# Patient Record
Sex: FEMALE | Race: WHITE | Employment: FULL TIME | URBAN - NONMETROPOLITAN AREA
[De-identification: names, ages, dates, MRNs, and addresses within clinical notes are randomized per-mention and may not be internally consistent; named-entity substitution may affect disease eponyms.]

---

## 2017-03-23 ENCOUNTER — OFFICE VISIT (OUTPATIENT)
Dept: FAMILY MEDICINE CLINIC | Age: 62
End: 2017-03-23

## 2017-03-23 VITALS
HEART RATE: 52 BPM | WEIGHT: 185 LBS | BODY MASS INDEX: 30.79 KG/M2 | RESPIRATION RATE: 14 BRPM | SYSTOLIC BLOOD PRESSURE: 166 MMHG | DIASTOLIC BLOOD PRESSURE: 94 MMHG

## 2017-03-23 DIAGNOSIS — G47.19 EXCESSIVE DAYTIME SLEEPINESS: ICD-10-CM

## 2017-03-23 DIAGNOSIS — I10 ESSENTIAL HYPERTENSION: Primary | ICD-10-CM

## 2017-03-23 DIAGNOSIS — Z00.00 ROUTINE HEALTH MAINTENANCE: ICD-10-CM

## 2017-03-23 LAB
A/G RATIO: 1.8 (ref 1.1–2.2)
ALBUMIN SERPL-MCNC: 4.5 G/DL (ref 3.4–5)
ALP BLD-CCNC: 71 U/L (ref 40–129)
ALT SERPL-CCNC: 12 U/L (ref 10–40)
ANION GAP SERPL CALCULATED.3IONS-SCNC: 13 MMOL/L (ref 3–16)
AST SERPL-CCNC: 15 U/L (ref 15–37)
BILIRUB SERPL-MCNC: 0.5 MG/DL (ref 0–1)
BUN BLDV-MCNC: 20 MG/DL (ref 7–20)
CALCIUM SERPL-MCNC: 9.6 MG/DL (ref 8.3–10.6)
CHLORIDE BLD-SCNC: 102 MMOL/L (ref 99–110)
CHOLESTEROL, TOTAL: 203 MG/DL (ref 0–199)
CO2: 27 MMOL/L (ref 21–32)
CREAT SERPL-MCNC: 1 MG/DL (ref 0.6–1.2)
GFR AFRICAN AMERICAN: >60
GFR NON-AFRICAN AMERICAN: 56
GLOBULIN: 2.5 G/DL
GLUCOSE BLD-MCNC: 86 MG/DL (ref 70–99)
HCT VFR BLD CALC: 41.5 % (ref 36–48)
HDLC SERPL-MCNC: 57 MG/DL (ref 40–60)
HEMOGLOBIN: 14 G/DL (ref 12–16)
LDL CHOLESTEROL CALCULATED: 126 MG/DL
MCH RBC QN AUTO: 31.1 PG (ref 26–34)
MCHC RBC AUTO-ENTMCNC: 33.8 G/DL (ref 31–36)
MCV RBC AUTO: 92 FL (ref 80–100)
PDW BLD-RTO: 12.1 % (ref 12.4–15.4)
PLATELET # BLD: 193 K/UL (ref 135–450)
PMV BLD AUTO: 10.8 FL (ref 5–10.5)
POTASSIUM SERPL-SCNC: 4.2 MMOL/L (ref 3.5–5.1)
RBC # BLD: 4.51 M/UL (ref 4–5.2)
SODIUM BLD-SCNC: 142 MMOL/L (ref 136–145)
TOTAL PROTEIN: 7 G/DL (ref 6.4–8.2)
TRIGL SERPL-MCNC: 102 MG/DL (ref 0–150)
TSH REFLEX: 1.56 UIU/ML (ref 0.27–4.2)
VLDLC SERPL CALC-MCNC: 20 MG/DL
WBC # BLD: 7 K/UL (ref 4–11)

## 2017-03-23 PROCEDURE — 99214 OFFICE O/P EST MOD 30 MIN: CPT | Performed by: NURSE PRACTITIONER

## 2017-03-23 RX ORDER — BISOPROLOL FUMARATE AND HYDROCHLOROTHIAZIDE 10; 6.25 MG/1; MG/1
1 TABLET ORAL DAILY
Qty: 30 TABLET | Refills: 3 | Status: SHIPPED | OUTPATIENT
Start: 2017-03-23 | End: 2017-09-21 | Stop reason: ALTCHOICE

## 2017-03-23 RX ORDER — LISINOPRIL 20 MG/1
20 TABLET ORAL DAILY
Qty: 30 TABLET | Refills: 0
Start: 2017-03-23 | End: 2017-03-23 | Stop reason: SINTOL

## 2017-03-23 ASSESSMENT — ENCOUNTER SYMPTOMS
COUGH: 0
EYE PAIN: 0
CHEST TIGHTNESS: 0
SHORTNESS OF BREATH: 0
NAUSEA: 0
ABDOMINAL PAIN: 0

## 2017-03-24 RX ORDER — ATORVASTATIN CALCIUM 20 MG/1
20 TABLET, FILM COATED ORAL DAILY
Qty: 30 TABLET | Refills: 3 | Status: SHIPPED | OUTPATIENT
Start: 2017-03-24 | End: 2017-12-19

## 2017-04-10 ENCOUNTER — TELEPHONE (OUTPATIENT)
Dept: FAMILY MEDICINE CLINIC | Age: 62
End: 2017-04-10

## 2017-04-11 DIAGNOSIS — I10 ESSENTIAL HYPERTENSION: Primary | ICD-10-CM

## 2017-04-11 RX ORDER — AMLODIPINE BESYLATE 5 MG/1
5 TABLET ORAL DAILY
Qty: 30 TABLET | Refills: 3 | Status: SHIPPED | OUTPATIENT
Start: 2017-04-11 | End: 2017-10-26 | Stop reason: SDUPTHER

## 2017-05-17 ENCOUNTER — TELEPHONE (OUTPATIENT)
Dept: FAMILY MEDICINE CLINIC | Age: 62
End: 2017-05-17

## 2017-06-23 ENCOUNTER — TELEPHONE (OUTPATIENT)
Dept: FAMILY MEDICINE CLINIC | Age: 62
End: 2017-06-23

## 2017-07-18 ENCOUNTER — TELEPHONE (OUTPATIENT)
Dept: FAMILY MEDICINE CLINIC | Age: 62
End: 2017-07-18

## 2017-09-16 DIAGNOSIS — E03.9 ACQUIRED HYPOTHYROIDISM: ICD-10-CM

## 2017-09-18 RX ORDER — LEVOTHYROXINE SODIUM 0.1 MG/1
TABLET ORAL
Qty: 30 TABLET | Refills: 3 | Status: SHIPPED | OUTPATIENT
Start: 2017-09-18 | End: 2017-12-19 | Stop reason: SDUPTHER

## 2017-09-21 ENCOUNTER — OFFICE VISIT (OUTPATIENT)
Dept: FAMILY MEDICINE CLINIC | Age: 62
End: 2017-09-21

## 2017-09-21 VITALS
RESPIRATION RATE: 14 BRPM | HEART RATE: 64 BPM | WEIGHT: 186 LBS | HEIGHT: 64 IN | BODY MASS INDEX: 31.76 KG/M2 | SYSTOLIC BLOOD PRESSURE: 132 MMHG | DIASTOLIC BLOOD PRESSURE: 78 MMHG

## 2017-09-21 DIAGNOSIS — E03.9 ACQUIRED HYPOTHYROIDISM: ICD-10-CM

## 2017-09-21 DIAGNOSIS — I10 ESSENTIAL HYPERTENSION: ICD-10-CM

## 2017-09-21 DIAGNOSIS — Z12.31 ENCOUNTER FOR SCREENING MAMMOGRAM FOR BREAST CANCER: ICD-10-CM

## 2017-09-21 PROCEDURE — G8427 DOCREV CUR MEDS BY ELIG CLIN: HCPCS | Performed by: NURSE PRACTITIONER

## 2017-09-21 PROCEDURE — 3017F COLORECTAL CA SCREEN DOC REV: CPT | Performed by: NURSE PRACTITIONER

## 2017-09-21 PROCEDURE — 99213 OFFICE O/P EST LOW 20 MIN: CPT | Performed by: NURSE PRACTITIONER

## 2017-09-21 PROCEDURE — 1036F TOBACCO NON-USER: CPT | Performed by: NURSE PRACTITIONER

## 2017-09-21 PROCEDURE — G8417 CALC BMI ABV UP PARAM F/U: HCPCS | Performed by: NURSE PRACTITIONER

## 2017-09-21 PROCEDURE — 3014F SCREEN MAMMO DOC REV: CPT | Performed by: NURSE PRACTITIONER

## 2017-09-21 RX ORDER — BISOPROLOL FUMARATE AND HYDROCHLOROTHIAZIDE 5; 6.25 MG/1; MG/1
1 TABLET ORAL 2 TIMES DAILY
Qty: 60 TABLET | Refills: 1 | Status: SHIPPED | OUTPATIENT
Start: 2017-09-21 | End: 2017-10-26 | Stop reason: SDUPTHER

## 2017-09-21 ASSESSMENT — PATIENT HEALTH QUESTIONNAIRE - PHQ9
2. FEELING DOWN, DEPRESSED OR HOPELESS: 0
SUM OF ALL RESPONSES TO PHQ9 QUESTIONS 1 & 2: 0
1. LITTLE INTEREST OR PLEASURE IN DOING THINGS: 0
SUM OF ALL RESPONSES TO PHQ QUESTIONS 1-9: 0

## 2017-09-22 ASSESSMENT — ENCOUNTER SYMPTOMS
NAUSEA: 0
COUGH: 0
ABDOMINAL PAIN: 0
CHEST TIGHTNESS: 0
SHORTNESS OF BREATH: 0
RESPIRATORY NEGATIVE: 1
EYES NEGATIVE: 1
GASTROINTESTINAL NEGATIVE: 1
EYE PAIN: 0

## 2017-10-18 ENCOUNTER — HOSPITAL ENCOUNTER (OUTPATIENT)
Dept: MAMMOGRAPHY | Age: 62
Discharge: HOME | End: 2017-10-18
Attending: NURSE PRACTITIONER | Admitting: NURSE PRACTITIONER

## 2017-10-18 DIAGNOSIS — Z12.31 ENCOUNTER FOR SCREENING MAMMOGRAM FOR BREAST CANCER: ICD-10-CM

## 2017-10-19 DIAGNOSIS — R92.8 ABNORMAL MAMMOGRAM: Primary | ICD-10-CM

## 2017-10-26 ENCOUNTER — OFFICE VISIT (OUTPATIENT)
Dept: FAMILY MEDICINE CLINIC | Age: 62
End: 2017-10-26

## 2017-10-26 VITALS
BODY MASS INDEX: 32.06 KG/M2 | OXYGEN SATURATION: 98 % | HEART RATE: 67 BPM | RESPIRATION RATE: 14 BRPM | SYSTOLIC BLOOD PRESSURE: 140 MMHG | WEIGHT: 186.8 LBS | DIASTOLIC BLOOD PRESSURE: 100 MMHG

## 2017-10-26 DIAGNOSIS — I10 ESSENTIAL HYPERTENSION: ICD-10-CM

## 2017-10-26 LAB
A/G RATIO: 1.7 (ref 1.1–2.2)
ALBUMIN SERPL-MCNC: 4.3 G/DL (ref 3.4–5)
ALP BLD-CCNC: 70 U/L (ref 40–129)
ALT SERPL-CCNC: 14 U/L (ref 10–40)
ANION GAP SERPL CALCULATED.3IONS-SCNC: 16 MMOL/L (ref 3–16)
AST SERPL-CCNC: 16 U/L (ref 15–37)
BILIRUB SERPL-MCNC: 0.6 MG/DL (ref 0–1)
BUN BLDV-MCNC: 21 MG/DL (ref 7–20)
CALCIUM SERPL-MCNC: 9.4 MG/DL (ref 8.3–10.6)
CHLORIDE BLD-SCNC: 98 MMOL/L (ref 99–110)
CHOLESTEROL, TOTAL: 188 MG/DL (ref 0–199)
CO2: 26 MMOL/L (ref 21–32)
CREAT SERPL-MCNC: 1 MG/DL (ref 0.6–1.2)
GFR AFRICAN AMERICAN: >60
GFR NON-AFRICAN AMERICAN: 56
GLOBULIN: 2.5 G/DL
GLUCOSE BLD-MCNC: 86 MG/DL (ref 70–99)
HDLC SERPL-MCNC: 58 MG/DL (ref 40–60)
LDL CHOLESTEROL CALCULATED: 105 MG/DL
POTASSIUM SERPL-SCNC: 3.9 MMOL/L (ref 3.5–5.1)
SODIUM BLD-SCNC: 140 MMOL/L (ref 136–145)
TOTAL PROTEIN: 6.8 G/DL (ref 6.4–8.2)
TRIGL SERPL-MCNC: 126 MG/DL (ref 0–150)
VLDLC SERPL CALC-MCNC: 25 MG/DL

## 2017-10-26 PROCEDURE — G8427 DOCREV CUR MEDS BY ELIG CLIN: HCPCS | Performed by: NURSE PRACTITIONER

## 2017-10-26 PROCEDURE — G8484 FLU IMMUNIZE NO ADMIN: HCPCS | Performed by: NURSE PRACTITIONER

## 2017-10-26 PROCEDURE — 3017F COLORECTAL CA SCREEN DOC REV: CPT | Performed by: NURSE PRACTITIONER

## 2017-10-26 PROCEDURE — G8417 CALC BMI ABV UP PARAM F/U: HCPCS | Performed by: NURSE PRACTITIONER

## 2017-10-26 PROCEDURE — 99213 OFFICE O/P EST LOW 20 MIN: CPT | Performed by: NURSE PRACTITIONER

## 2017-10-26 PROCEDURE — 3014F SCREEN MAMMO DOC REV: CPT | Performed by: NURSE PRACTITIONER

## 2017-10-26 PROCEDURE — 1036F TOBACCO NON-USER: CPT | Performed by: NURSE PRACTITIONER

## 2017-10-26 RX ORDER — AMLODIPINE BESYLATE 5 MG/1
5 TABLET ORAL DAILY
Qty: 30 TABLET | Refills: 1 | Status: SHIPPED | OUTPATIENT
Start: 2017-10-26 | End: 2017-11-09 | Stop reason: SDUPTHER

## 2017-10-26 RX ORDER — BISOPROLOL FUMARATE AND HYDROCHLOROTHIAZIDE 5; 6.25 MG/1; MG/1
1 TABLET ORAL 2 TIMES DAILY
Qty: 60 TABLET | Refills: 1 | Status: SHIPPED | OUTPATIENT
Start: 2017-10-26 | End: 2017-11-09 | Stop reason: SDUPTHER

## 2017-10-26 NOTE — PROGRESS NOTES
Subjective:   Brittany Lee is a 58 y.o. female with hypertension. Current Outpatient Prescriptions   Medication Sig Dispense Refill    bisoprolol-hydrochlorothiazide (ZIAC) 5-6.25 MG per tablet Take 1 tablet by mouth 2 times daily 60 tablet 1    amLODIPine (NORVASC) 5 MG tablet Take 1 tablet by mouth daily 30 tablet 1    levothyroxine (SYNTHROID) 100 MCG tablet TAKE ONE TABLET BY MOUTH ONCE DAILY 30 tablet 3    atorvastatin (LIPITOR) 20 MG tablet Take 1 tablet by mouth daily 30 tablet 3     No current facility-administered medications for this visit. Hypertension ROS: no medication side effects noted, no TIA's, no chest pain on exertion, no dyspnea on exertion, no swelling of ankles. New concerns: not feeling well. Has not been taking norvasc. 'didn't know I was supposed to be on it'     Objective:   BP (!) 140/100 (Site: Right Arm, Position: Sitting, Cuff Size: Medium Adult)   Pulse 67   Resp 14   Wt 186 lb 12.8 oz (84.7 kg)   SpO2 98%   BMI 32.06 kg/m²    Appearance alert, well appearing, and in no distress, oriented to person, place, and time, normal appearing weight and well hydrated. General exam BP noted to be well controlled today in office, S1, S2 normal, no gallop, no murmur, chest clear, no JVD, no HSM, no edema. Lab review: orders written for new lab studies as appropriate; see orders. Assessment:    Hypertension control uncertain and poorly controlled. Plan:   Repeat labs ordered prior to next appointment. Orders and follow up as documented in patient record. The following changes are to be made: add norvasc  back in.    1. Essential hypertension  meds per order. Labs today    - bisoprolol-hydrochlorothiazide (ZIAC) 5-6.25 MG per tablet; Take 1 tablet by mouth 2 times daily  Dispense: 60 tablet; Refill: 1  - amLODIPine (NORVASC) 5 MG tablet; Take 1 tablet by mouth daily  Dispense: 30 tablet;  Refill: 1  - Comprehensive Metabolic Panel  - Lipid Panel    Return in about 2

## 2017-11-02 ENCOUNTER — HOSPITAL ENCOUNTER (OUTPATIENT)
Dept: ULTRASOUND IMAGING | Age: 62
Discharge: HOME | End: 2017-11-02
Attending: NURSE PRACTITIONER | Admitting: NURSE PRACTITIONER

## 2017-11-02 DIAGNOSIS — R92.8 ABNORMAL MAMMOGRAM: ICD-10-CM

## 2017-11-09 ENCOUNTER — OFFICE VISIT (OUTPATIENT)
Dept: FAMILY MEDICINE CLINIC | Age: 62
End: 2017-11-09

## 2017-11-09 VITALS
OXYGEN SATURATION: 98 % | BODY MASS INDEX: 31.58 KG/M2 | SYSTOLIC BLOOD PRESSURE: 148 MMHG | HEART RATE: 54 BPM | WEIGHT: 184 LBS | DIASTOLIC BLOOD PRESSURE: 76 MMHG | RESPIRATION RATE: 14 BRPM

## 2017-11-09 DIAGNOSIS — Z56.6 STRESS AT WORK: ICD-10-CM

## 2017-11-09 DIAGNOSIS — I10 ESSENTIAL HYPERTENSION: Primary | ICD-10-CM

## 2017-11-09 PROCEDURE — G8417 CALC BMI ABV UP PARAM F/U: HCPCS | Performed by: NURSE PRACTITIONER

## 2017-11-09 PROCEDURE — 99213 OFFICE O/P EST LOW 20 MIN: CPT | Performed by: NURSE PRACTITIONER

## 2017-11-09 PROCEDURE — 3017F COLORECTAL CA SCREEN DOC REV: CPT | Performed by: NURSE PRACTITIONER

## 2017-11-09 PROCEDURE — 1036F TOBACCO NON-USER: CPT | Performed by: NURSE PRACTITIONER

## 2017-11-09 PROCEDURE — G8482 FLU IMMUNIZE ORDER/ADMIN: HCPCS | Performed by: NURSE PRACTITIONER

## 2017-11-09 PROCEDURE — G8427 DOCREV CUR MEDS BY ELIG CLIN: HCPCS | Performed by: NURSE PRACTITIONER

## 2017-11-09 PROCEDURE — 3014F SCREEN MAMMO DOC REV: CPT | Performed by: NURSE PRACTITIONER

## 2017-11-09 RX ORDER — BISOPROLOL FUMARATE AND HYDROCHLOROTHIAZIDE 5; 6.25 MG/1; MG/1
1 TABLET ORAL 2 TIMES DAILY
Qty: 60 TABLET | Refills: 1 | Status: SHIPPED | OUTPATIENT
Start: 2017-11-09 | End: 2017-12-01 | Stop reason: SDUPTHER

## 2017-11-09 RX ORDER — AMLODIPINE BESYLATE 5 MG/1
5 TABLET ORAL 2 TIMES DAILY
Qty: 30 TABLET | Refills: 1 | Status: SHIPPED | OUTPATIENT
Start: 2017-11-09 | End: 2017-12-01 | Stop reason: SDUPTHER

## 2017-11-09 SDOH — HEALTH STABILITY - MENTAL HEALTH: OTHER PHYSICAL AND MENTAL STRAIN RELATED TO WORK: Z56.6

## 2017-11-09 ASSESSMENT — ENCOUNTER SYMPTOMS
EYE PAIN: 0
SHORTNESS OF BREATH: 0
COUGH: 0
CHEST TIGHTNESS: 0
ABDOMINAL PAIN: 0
NAUSEA: 0

## 2017-11-09 NOTE — PROGRESS NOTES
SUBJECTIVE:    Cierra Ames  1955  58 y.o.  female      Chief Complaint   Patient presents with    Follow-up     2 week f/u, pt feeling better and blood pressure is better as well    Other     would like to discuss how to manage stress     HPI    No problem-specific Assessment & Plan notes found for this encounter. Review of Systems    OBJECTIVE:    BP (!) 148/76 (Site: Right Arm, Position: Sitting, Cuff Size: Medium Adult)   Pulse 54   Resp 14   Wt 184 lb (83.5 kg)   SpO2 98%   BMI 31.58 kg/m²     Physical Exam    ASSESSMENT/PLAN:    1. Essential hypertension  ***  - amLODIPine (NORVASC) 5 MG tablet; Take 1 tablet by mouth 2 times daily  Dispense: 30 tablet; Refill: 1  - bisoprolol-hydrochlorothiazide (ZIAC) 5-6.25 MG per tablet; Take 1 tablet by mouth 2 times daily  Dispense: 60 tablet; Refill: 1    2. Stress at work  ***  - Ambulatory referral to Psychology      Return in about 1 month (around 12/9/2017) for HTN.             (Please note that portions of this note may have been completed with a voice recognition program. Efforts were made to edit the dictations but occasionally words are mis-transcribed.)

## 2017-11-09 NOTE — Clinical Note
This lady would like to see you for some suggestions on how to handle stress at work. She prefers to modify her behavior change her thinking rather than take medication. She seems excited to see you. Thanks!

## 2017-11-09 NOTE — ASSESSMENT & PLAN NOTE
Reports that she has feeling better. She notices she has more trouble with her blood pressure when she has periods of stress at work. She is asking for help with stress management and is agreeable to a referral to Dr. Denise Shook.

## 2017-11-09 NOTE — PROGRESS NOTES
SUBJECTIVE:    Author Harshad  1955  58 y.o.  female      Chief Complaint   Patient presents with    Follow-up     2 week f/u, pt feeling better and blood pressure is better as well    Other     would like to discuss how to manage stress     HPI    Essential hypertension  Reports that she has feeling better. She notices she has more trouble with her blood pressure when she has periods of stress at work. She is asking for help with stress management and is agreeable to a referral to Dr. Robyn Sánchez. Review of Systems   Constitutional: Negative for fatigue and unexpected weight change. Eyes: Negative for pain and visual disturbance. Respiratory: Negative for cough, chest tightness and shortness of breath. Cardiovascular: Negative for chest pain, palpitations and leg swelling. Gastrointestinal: Negative for abdominal pain and nausea. Endocrine: Negative. Neurological: Positive for headaches. Negative for dizziness, syncope, facial asymmetry, speech difficulty, light-headedness and numbness. All other systems reviewed and are negative. OBJECTIVE:    BP (!) 148/76 (Site: Right Arm, Position: Sitting, Cuff Size: Medium Adult)   Pulse 54   Resp 14   Wt 184 lb (83.5 kg)   SpO2 98%   BMI 31.58 kg/m²     Physical Exam   Constitutional: She is oriented to person, place, and time. She appears well-developed and well-nourished. HENT:   Head: Normocephalic and atraumatic. Eyes: Pupils are equal, round, and reactive to light. Neck: Normal range of motion. Neck supple. Cardiovascular: Normal rate, regular rhythm, normal heart sounds and intact distal pulses. No murmur heard. Pulmonary/Chest: Effort normal and breath sounds normal. No respiratory distress. Abdominal: Soft. Neurological: She is alert and oriented to person, place, and time. Skin: Skin is warm and dry. Psychiatric: She has a normal mood and affect. Judgment normal.   Vitals reviewed. ASSESSMENT/PLAN:    1.

## 2017-12-01 DIAGNOSIS — I10 ESSENTIAL HYPERTENSION: ICD-10-CM

## 2017-12-01 RX ORDER — AMLODIPINE BESYLATE 5 MG/1
5 TABLET ORAL 2 TIMES DAILY
Qty: 30 TABLET | Refills: 1 | Status: SHIPPED | OUTPATIENT
Start: 2017-12-01 | End: 2017-12-19 | Stop reason: SDUPTHER

## 2017-12-01 RX ORDER — BISOPROLOL FUMARATE AND HYDROCHLOROTHIAZIDE 5; 6.25 MG/1; MG/1
1 TABLET ORAL 2 TIMES DAILY
Qty: 60 TABLET | Refills: 1 | Status: SHIPPED | OUTPATIENT
Start: 2017-12-01 | End: 2017-12-19 | Stop reason: SDUPTHER

## 2017-12-01 NOTE — TELEPHONE ENCOUNTER
From: Brittany Lee  Sent: 12/1/2017 10:00 AM EST  Subject: Medication Renewal Request    Charmayne Rick. Fabio Bene would like a refill of the following medications:  amLODIPine (NORVASC) 5 MG tablet Sharlene Valdez CNP]  bisoprolol-hydrochlorothiazide [facility]) 5-6.25 MG per tablet Sharlene Valdez CNP]    Preferred pharmacy: [address] [phone] - F [phone]    Comment:

## 2017-12-19 ENCOUNTER — OFFICE VISIT (OUTPATIENT)
Dept: FAMILY MEDICINE CLINIC | Age: 62
End: 2017-12-19

## 2017-12-19 VITALS
OXYGEN SATURATION: 99 % | SYSTOLIC BLOOD PRESSURE: 122 MMHG | RESPIRATION RATE: 14 BRPM | DIASTOLIC BLOOD PRESSURE: 76 MMHG | WEIGHT: 185 LBS | HEART RATE: 56 BPM | BODY MASS INDEX: 31.76 KG/M2

## 2017-12-19 DIAGNOSIS — I10 ESSENTIAL HYPERTENSION: ICD-10-CM

## 2017-12-19 DIAGNOSIS — E03.9 ACQUIRED HYPOTHYROIDISM: ICD-10-CM

## 2017-12-19 PROCEDURE — G8417 CALC BMI ABV UP PARAM F/U: HCPCS | Performed by: NURSE PRACTITIONER

## 2017-12-19 PROCEDURE — 99213 OFFICE O/P EST LOW 20 MIN: CPT | Performed by: NURSE PRACTITIONER

## 2017-12-19 PROCEDURE — 3014F SCREEN MAMMO DOC REV: CPT | Performed by: NURSE PRACTITIONER

## 2017-12-19 PROCEDURE — 1036F TOBACCO NON-USER: CPT | Performed by: NURSE PRACTITIONER

## 2017-12-19 PROCEDURE — G8482 FLU IMMUNIZE ORDER/ADMIN: HCPCS | Performed by: NURSE PRACTITIONER

## 2017-12-19 PROCEDURE — 3017F COLORECTAL CA SCREEN DOC REV: CPT | Performed by: NURSE PRACTITIONER

## 2017-12-19 PROCEDURE — G8427 DOCREV CUR MEDS BY ELIG CLIN: HCPCS | Performed by: NURSE PRACTITIONER

## 2017-12-19 RX ORDER — AMLODIPINE BESYLATE 5 MG/1
5 TABLET ORAL 2 TIMES DAILY
Qty: 60 TABLET | Refills: 5 | Status: SHIPPED | OUTPATIENT
Start: 2017-12-19 | End: 2018-05-04 | Stop reason: ALTCHOICE

## 2017-12-19 RX ORDER — BISOPROLOL FUMARATE AND HYDROCHLOROTHIAZIDE 5; 6.25 MG/1; MG/1
1 TABLET ORAL 2 TIMES DAILY
Qty: 60 TABLET | Refills: 5 | Status: SHIPPED | OUTPATIENT
Start: 2017-12-19 | End: 2019-06-17 | Stop reason: SDUPTHER

## 2017-12-19 RX ORDER — LEVOTHYROXINE SODIUM 0.1 MG/1
TABLET ORAL
Qty: 30 TABLET | Refills: 5 | Status: SHIPPED | OUTPATIENT
Start: 2017-12-19 | End: 2018-07-27 | Stop reason: SDUPTHER

## 2017-12-19 ASSESSMENT — ENCOUNTER SYMPTOMS
COUGH: 0
ABDOMINAL PAIN: 0
NAUSEA: 0
CHEST TIGHTNESS: 0
GASTROINTESTINAL NEGATIVE: 1
EYE PAIN: 0
SHORTNESS OF BREATH: 0
RESPIRATORY NEGATIVE: 1

## 2017-12-19 NOTE — PROGRESS NOTES
SUBJECTIVE:    [facility]  1955  58 y.o.  female      Chief Complaint   Patient presents with    1 Month Follow-Up     HPI    Essential hypertension  No problems with SOB, CP, Blurred or double vision, no headaches or other concerns. No compliance problems with medications. Review of Systems   Constitutional: Negative. Negative for fatigue and unexpected weight change. Eyes: Negative for pain and visual disturbance. Respiratory: Negative. Negative for cough, chest tightness and shortness of breath. Cardiovascular: Negative for chest pain, palpitations and leg swelling. Gastrointestinal: Negative. Negative for abdominal pain and nausea. Endocrine: Negative. Neurological: Negative. Negative for dizziness, syncope, facial asymmetry, speech difficulty, light-headedness, numbness and headaches. Concern for right side 'face drooping'. Remote history of hitting face while playing soft ball in HS. Has noticed a larger 'crease' in her right cheek than left. All other systems reviewed and are negative. OBJECTIVE:    /76 (Site: Right Arm, Position: Sitting, Cuff Size: Large Adult)   Pulse 56   Resp 14   Wt 185 lb (83.9 kg)   SpO2 99%   BMI 31.76 kg/m²     Physical Exam   Constitutional: She is oriented to person, place, and time. She appears well-developed and well-nourished. No distress. HENT:   Head: Normocephalic and atraumatic. Head is without right periorbital erythema and without left periorbital erythema. Hair is normal.   Right Ear: External ear normal.   Left Ear: External ear normal.   Nose: Nose normal.   Mouth/Throat: Uvula is midline and oropharynx is clear and moist.   Eyes: Conjunctivae and EOM are normal. Pupils are equal, round, and reactive to light. Neck: Normal range of motion. Neck supple. No JVD present. Cardiovascular: Normal rate, regular rhythm, normal heart sounds and intact distal pulses.     Pulmonary/Chest: Effort normal and breath sounds normal. No respiratory distress. Lymphadenopathy:     She has no cervical adenopathy. Neurological: She is alert and oriented to person, place, and time. She has normal strength and normal reflexes. No cranial nerve deficit. Coordination normal.   Skin: Skin is warm and dry. Psychiatric: She has a normal mood and affect. Judgment normal.   Vitals reviewed. ASSESSMENT/PLAN:    1. Essential hypertension  Continue current medications. FU in 6 months. - amLODIPine (NORVASC) 5 MG tablet; Take 1 tablet by mouth 2 times daily  Dispense: 60 tablet; Refill: 5  - bisoprolol-hydrochlorothiazide (ZIAC) 5-6.25 MG per tablet; Take 1 tablet by mouth 2 times daily  Dispense: 60 tablet; Refill: 5    2. Acquired hypothyroidism  Continue medications. FU in six months. - levothyroxine (SYNTHROID) 100 MCG tablet; TAKE ONE TABLET BY MOUTH ONCE DAILY  Dispense: 30 tablet; Refill: 5      Return in about 6 months (around 6/19/2018).             (Please note that portions of this note may have been completed with a voice recognition program. Efforts were made to edit the dictations but occasionally words are mis-transcribed.)

## 2018-05-04 ENCOUNTER — OFFICE VISIT (OUTPATIENT)
Dept: FAMILY MEDICINE CLINIC | Age: 63
End: 2018-05-04

## 2018-05-04 VITALS
HEART RATE: 68 BPM | WEIGHT: 188.6 LBS | DIASTOLIC BLOOD PRESSURE: 74 MMHG | OXYGEN SATURATION: 97 % | SYSTOLIC BLOOD PRESSURE: 138 MMHG | BODY MASS INDEX: 31.42 KG/M2 | HEIGHT: 65 IN

## 2018-05-04 DIAGNOSIS — I10 ESSENTIAL HYPERTENSION: Primary | ICD-10-CM

## 2018-05-04 DIAGNOSIS — R60.0 LOCALIZED EDEMA: ICD-10-CM

## 2018-05-04 PROCEDURE — 99213 OFFICE O/P EST LOW 20 MIN: CPT | Performed by: NURSE PRACTITIONER

## 2018-05-04 PROCEDURE — G8417 CALC BMI ABV UP PARAM F/U: HCPCS | Performed by: NURSE PRACTITIONER

## 2018-05-04 PROCEDURE — 3017F COLORECTAL CA SCREEN DOC REV: CPT | Performed by: NURSE PRACTITIONER

## 2018-05-04 PROCEDURE — 1036F TOBACCO NON-USER: CPT | Performed by: NURSE PRACTITIONER

## 2018-05-04 PROCEDURE — 3014F SCREEN MAMMO DOC REV: CPT | Performed by: NURSE PRACTITIONER

## 2018-05-04 PROCEDURE — G8427 DOCREV CUR MEDS BY ELIG CLIN: HCPCS | Performed by: NURSE PRACTITIONER

## 2018-05-04 RX ORDER — HYDROCHLOROTHIAZIDE 12.5 MG/1
12.5 TABLET ORAL DAILY
Qty: 30 TABLET | Refills: 0 | Status: SHIPPED | OUTPATIENT
Start: 2018-05-04 | End: 2018-06-18 | Stop reason: SDUPTHER

## 2018-05-04 RX ORDER — LOSARTAN POTASSIUM 50 MG/1
50 TABLET ORAL DAILY
Qty: 30 TABLET | Refills: 3 | Status: SHIPPED | OUTPATIENT
Start: 2018-05-04 | End: 2018-06-05 | Stop reason: ALTCHOICE

## 2018-05-04 ASSESSMENT — ENCOUNTER SYMPTOMS
DIARRHEA: 0
CHEST TIGHTNESS: 0
SHORTNESS OF BREATH: 0
VOMITING: 0
EYE PAIN: 0
EYES NEGATIVE: 1
COUGH: 0
NAUSEA: 0
GASTROINTESTINAL NEGATIVE: 1
ABDOMINAL PAIN: 0
RESPIRATORY NEGATIVE: 1

## 2018-05-11 ENCOUNTER — TELEPHONE (OUTPATIENT)
Dept: FAMILY MEDICINE CLINIC | Age: 63
End: 2018-05-11

## 2018-06-05 ENCOUNTER — OFFICE VISIT (OUTPATIENT)
Dept: FAMILY MEDICINE CLINIC | Age: 63
End: 2018-06-05

## 2018-06-05 VITALS
HEIGHT: 65 IN | OXYGEN SATURATION: 98 % | BODY MASS INDEX: 32.09 KG/M2 | WEIGHT: 192.6 LBS | RESPIRATION RATE: 14 BRPM | DIASTOLIC BLOOD PRESSURE: 76 MMHG | SYSTOLIC BLOOD PRESSURE: 124 MMHG | HEART RATE: 53 BPM

## 2018-06-05 DIAGNOSIS — R60.0 LOCALIZED EDEMA: ICD-10-CM

## 2018-06-05 DIAGNOSIS — I10 ESSENTIAL HYPERTENSION: Primary | ICD-10-CM

## 2018-06-05 DIAGNOSIS — G47.19 EXCESSIVE DAYTIME SLEEPINESS: ICD-10-CM

## 2018-06-05 DIAGNOSIS — E03.9 ACQUIRED HYPOTHYROIDISM: ICD-10-CM

## 2018-06-05 DIAGNOSIS — Z13.220 LIPID SCREENING: ICD-10-CM

## 2018-06-05 LAB
A/G RATIO: 1.8 (ref 1.1–2.2)
ALBUMIN SERPL-MCNC: 4.3 G/DL (ref 3.4–5)
ALP BLD-CCNC: 46 U/L (ref 40–129)
ALT SERPL-CCNC: 14 U/L (ref 10–40)
ANION GAP SERPL CALCULATED.3IONS-SCNC: 12 MMOL/L (ref 3–16)
AST SERPL-CCNC: 15 U/L (ref 15–37)
BILIRUB SERPL-MCNC: 0.5 MG/DL (ref 0–1)
BUN BLDV-MCNC: 27 MG/DL (ref 7–20)
CALCIUM SERPL-MCNC: 9.9 MG/DL (ref 8.3–10.6)
CHLORIDE BLD-SCNC: 100 MMOL/L (ref 99–110)
CHOLESTEROL, TOTAL: 184 MG/DL (ref 0–199)
CO2: 30 MMOL/L (ref 21–32)
CREAT SERPL-MCNC: 0.9 MG/DL (ref 0.6–1.2)
GFR AFRICAN AMERICAN: >60
GFR NON-AFRICAN AMERICAN: >60
GLOBULIN: 2.4 G/DL
GLUCOSE BLD-MCNC: 100 MG/DL (ref 70–99)
HCT VFR BLD CALC: 39.2 % (ref 36–48)
HDLC SERPL-MCNC: 66 MG/DL (ref 40–60)
HEMOGLOBIN: 13.6 G/DL (ref 12–16)
LDL CHOLESTEROL CALCULATED: 100 MG/DL
MCH RBC QN AUTO: 31.2 PG (ref 26–34)
MCHC RBC AUTO-ENTMCNC: 34.8 G/DL (ref 31–36)
MCV RBC AUTO: 89.6 FL (ref 80–100)
PDW BLD-RTO: 13.9 % (ref 12.4–15.4)
PLATELET # BLD: 161 K/UL (ref 135–450)
PMV BLD AUTO: 11 FL (ref 5–10.5)
POTASSIUM SERPL-SCNC: 4 MMOL/L (ref 3.5–5.1)
RBC # BLD: 4.38 M/UL (ref 4–5.2)
SODIUM BLD-SCNC: 142 MMOL/L (ref 136–145)
TOTAL PROTEIN: 6.7 G/DL (ref 6.4–8.2)
TRIGL SERPL-MCNC: 91 MG/DL (ref 0–150)
TSH REFLEX: 2.44 UIU/ML (ref 0.27–4.2)
VLDLC SERPL CALC-MCNC: 18 MG/DL
WBC # BLD: 6.6 K/UL (ref 4–11)

## 2018-06-05 PROCEDURE — G8427 DOCREV CUR MEDS BY ELIG CLIN: HCPCS | Performed by: NURSE PRACTITIONER

## 2018-06-05 PROCEDURE — 99214 OFFICE O/P EST MOD 30 MIN: CPT | Performed by: NURSE PRACTITIONER

## 2018-06-05 PROCEDURE — G8417 CALC BMI ABV UP PARAM F/U: HCPCS | Performed by: NURSE PRACTITIONER

## 2018-06-05 PROCEDURE — 3017F COLORECTAL CA SCREEN DOC REV: CPT | Performed by: NURSE PRACTITIONER

## 2018-06-05 PROCEDURE — 3014F SCREEN MAMMO DOC REV: CPT | Performed by: NURSE PRACTITIONER

## 2018-06-05 PROCEDURE — 1036F TOBACCO NON-USER: CPT | Performed by: NURSE PRACTITIONER

## 2018-06-05 RX ORDER — AMLODIPINE BESYLATE 5 MG/1
TABLET ORAL
COMMUNITY
Start: 2018-05-24 | End: 2018-12-05 | Stop reason: SDUPTHER

## 2018-06-05 ASSESSMENT — ENCOUNTER SYMPTOMS
EYE PAIN: 0
CHEST TIGHTNESS: 0
RESPIRATORY NEGATIVE: 1
COUGH: 0
NAUSEA: 0
GASTROINTESTINAL NEGATIVE: 1
ABDOMINAL PAIN: 0
EYES NEGATIVE: 1
SHORTNESS OF BREATH: 0

## 2018-06-06 DIAGNOSIS — R73.09 ELEVATED GLUCOSE: Primary | ICD-10-CM

## 2018-06-06 DIAGNOSIS — R73.09 ELEVATED GLUCOSE: ICD-10-CM

## 2018-06-07 LAB
ESTIMATED AVERAGE GLUCOSE: 96.8 MG/DL
HBA1C MFR BLD: 5 %

## 2018-06-18 DIAGNOSIS — I10 ESSENTIAL HYPERTENSION: ICD-10-CM

## 2018-06-18 RX ORDER — HYDROCHLOROTHIAZIDE 12.5 MG/1
12.5 TABLET ORAL DAILY
Qty: 90 TABLET | Refills: 3 | Status: SHIPPED | OUTPATIENT
Start: 2018-06-18 | End: 2019-06-17 | Stop reason: SDUPTHER

## 2018-07-27 DIAGNOSIS — E03.9 ACQUIRED HYPOTHYROIDISM: ICD-10-CM

## 2018-07-27 RX ORDER — LEVOTHYROXINE SODIUM 0.1 MG/1
TABLET ORAL
Qty: 30 TABLET | Refills: 5 | Status: SHIPPED | OUTPATIENT
Start: 2018-07-27 | End: 2019-01-23 | Stop reason: SDUPTHER

## 2018-08-30 DIAGNOSIS — I10 ESSENTIAL HYPERTENSION: ICD-10-CM

## 2018-08-31 RX ORDER — AMLODIPINE BESYLATE 5 MG/1
TABLET ORAL
Qty: 30 TABLET | Refills: 1 | Status: SHIPPED | OUTPATIENT
Start: 2018-08-31 | End: 2018-10-05 | Stop reason: SDUPTHER

## 2018-12-05 ENCOUNTER — OFFICE VISIT (OUTPATIENT)
Dept: FAMILY MEDICINE CLINIC | Age: 63
End: 2018-12-05
Payer: COMMERCIAL

## 2018-12-05 VITALS
SYSTOLIC BLOOD PRESSURE: 138 MMHG | HEART RATE: 62 BPM | BODY MASS INDEX: 32.95 KG/M2 | RESPIRATION RATE: 16 BRPM | TEMPERATURE: 98.5 F | WEIGHT: 198 LBS | OXYGEN SATURATION: 96 % | DIASTOLIC BLOOD PRESSURE: 80 MMHG

## 2018-12-05 DIAGNOSIS — M17.10 PRIMARY OSTEOARTHRITIS OF KNEE, UNSPECIFIED LATERALITY: ICD-10-CM

## 2018-12-05 DIAGNOSIS — I10 ESSENTIAL HYPERTENSION: Primary | ICD-10-CM

## 2018-12-05 DIAGNOSIS — R60.0 LOCALIZED EDEMA: ICD-10-CM

## 2018-12-05 PROCEDURE — G8427 DOCREV CUR MEDS BY ELIG CLIN: HCPCS | Performed by: NURSE PRACTITIONER

## 2018-12-05 PROCEDURE — G8484 FLU IMMUNIZE NO ADMIN: HCPCS | Performed by: NURSE PRACTITIONER

## 2018-12-05 PROCEDURE — 99213 OFFICE O/P EST LOW 20 MIN: CPT | Performed by: NURSE PRACTITIONER

## 2018-12-05 PROCEDURE — 3017F COLORECTAL CA SCREEN DOC REV: CPT | Performed by: NURSE PRACTITIONER

## 2018-12-05 PROCEDURE — 1036F TOBACCO NON-USER: CPT | Performed by: NURSE PRACTITIONER

## 2018-12-05 PROCEDURE — 3014F SCREEN MAMMO DOC REV: CPT | Performed by: NURSE PRACTITIONER

## 2018-12-05 PROCEDURE — G8417 CALC BMI ABV UP PARAM F/U: HCPCS | Performed by: NURSE PRACTITIONER

## 2018-12-05 ASSESSMENT — ENCOUNTER SYMPTOMS
ABDOMINAL PAIN: 0
RESPIRATORY NEGATIVE: 1
EYE PAIN: 0
COUGH: 0
NAUSEA: 0
SHORTNESS OF BREATH: 0
GASTROINTESTINAL NEGATIVE: 1
CHEST TIGHTNESS: 0
EYES NEGATIVE: 1

## 2018-12-05 ASSESSMENT — PATIENT HEALTH QUESTIONNAIRE - PHQ9
SUM OF ALL RESPONSES TO PHQ QUESTIONS 1-9: 0
SUM OF ALL RESPONSES TO PHQ QUESTIONS 1-9: 0
1. LITTLE INTEREST OR PLEASURE IN DOING THINGS: 0
2. FEELING DOWN, DEPRESSED OR HOPELESS: 0
SUM OF ALL RESPONSES TO PHQ9 QUESTIONS 1 & 2: 0

## 2018-12-05 NOTE — PROGRESS NOTES
SUBJECTIVE:    Timothy Sites  1955  61 y.o.  female      Chief Complaint   Patient presents with    6 Month Follow-Up     Blood Pressure     HPI     Essential hypertension  The patient is taking hypertensive medications compliantly without side effects. Denies chest pain, dyspnea, edema, or TIA's. Localized edema  Reporting swelling of feet and ankles, hands. Problem with this with norvasc in the past.   Will stop and try different med in class. Primary osteoarthritis of knee  Having pain in both knees, left is worse. Has to be careful up stairs. Hands are stiff and sore in the mornings. Reports she is taking glucosamine chondroitin supplement BID and this is not helping much. Would like to just have this noted on her chart but does not want xrays or testing done at this time. Current Outpatient Prescriptions on File Prior to Visit   Medication Sig Dispense Refill    levothyroxine (SYNTHROID) 100 MCG tablet TAKE ONE TABLET BY MOUTH ONCE DAILY 30 tablet 5    hydrochlorothiazide (HYDRODIURIL) 12.5 MG tablet Take 1 tablet by mouth daily 90 tablet 3    bisoprolol-hydrochlorothiazide (ZIAC) 5-6.25 MG per tablet Take 1 tablet by mouth 2 times daily 60 tablet 5     No current facility-administered medications on file prior to visit. Review of PMH, PSH, Family Hx, Allergies and updates made as needed. Review of Systems   Constitutional: Negative. Negative for fatigue and unexpected weight change. Eyes: Negative. Negative for pain and visual disturbance. Respiratory: Negative. Negative for cough, chest tightness and shortness of breath. Cardiovascular: Negative. Negative for chest pain, palpitations and leg swelling. Gastrointestinal: Negative. Negative for abdominal pain and nausea. Endocrine: Negative. Musculoskeletal: Positive for arthralgias and joint swelling. Neurological: Negative.   Negative for dizziness, syncope, facial asymmetry, speech difficulty, light-headedness, numbness and headaches. Psychiatric/Behavioral: Negative for dysphoric mood and sleep disturbance. The patient is not nervous/anxious. All other systems reviewed and are negative. OBJECTIVE:    /80 (Site: Right Upper Arm, Position: Sitting, Cuff Size: Small Adult)   Pulse 62   Temp 98.5 °F (36.9 °C) (Temporal)   Resp 16   Wt 198 lb (89.8 kg)   SpO2 96%   BMI 32.95 kg/m²     Physical Exam   Constitutional: She is oriented to person, place, and time. She appears well-developed and well-nourished. HENT:   Head: Normocephalic and atraumatic. Eyes: Pupils are equal, round, and reactive to light. Neck: Normal range of motion. Neck supple. Cardiovascular: Normal rate, regular rhythm, normal heart sounds and intact distal pulses. Exam reveals no friction rub. No murmur heard. Pulmonary/Chest: Effort normal and breath sounds normal. No respiratory distress. She has no wheezes. She has no rales. Abdominal: Soft. There is no tenderness. Musculoskeletal: Normal range of motion. She exhibits edema (bilat ankles. hands with knuckle swelling- bony tenderness) and tenderness. Neurological: She is alert and oriented to person, place, and time. No cranial nerve deficit. Skin: Skin is warm and dry. Capillary refill takes less than 2 seconds. No rash noted. Psychiatric: She has a normal mood and affect. Judgment and thought content normal.   Vitals reviewed. ASSESSMENT/PLAN:    Problem List     Essential hypertension - Primary     The patient is taking hypertensive medications compliantly without side effects. Denies chest pain, dyspnea, edema, or TIA's. Relevant Medications    bisoprolol-hydrochlorothiazide (ZIAC) 5-6.25 MG per tablet    hydrochlorothiazide (HYDRODIURIL) 12.5 MG tablet    verapamil (CALAN SR) 120 MG extended release tablet    Localized edema     Reporting swelling of feet and ankles, hands.    Problem with this with norvasc in

## 2018-12-11 DIAGNOSIS — I10 ESSENTIAL HYPERTENSION: ICD-10-CM

## 2018-12-11 RX ORDER — BISOPROLOL FUMARATE AND HYDROCHLOROTHIAZIDE 5; 6.25 MG/1; MG/1
TABLET ORAL
Qty: 60 TABLET | Refills: 1 | Status: SHIPPED | OUTPATIENT
Start: 2018-12-11 | End: 2019-02-25 | Stop reason: SDUPTHER

## 2019-01-23 DIAGNOSIS — E03.9 ACQUIRED HYPOTHYROIDISM: ICD-10-CM

## 2019-01-23 RX ORDER — LEVOTHYROXINE SODIUM 0.1 MG/1
TABLET ORAL
Qty: 30 TABLET | Refills: 5 | Status: SHIPPED | OUTPATIENT
Start: 2019-01-23 | End: 2019-06-07 | Stop reason: SDUPTHER

## 2019-04-02 ENCOUNTER — OFFICE VISIT (OUTPATIENT)
Dept: FAMILY MEDICINE CLINIC | Age: 64
End: 2019-04-02
Payer: COMMERCIAL

## 2019-04-02 VITALS
WEIGHT: 205 LBS | BODY MASS INDEX: 34.11 KG/M2 | HEART RATE: 84 BPM | DIASTOLIC BLOOD PRESSURE: 88 MMHG | RESPIRATION RATE: 16 BRPM | SYSTOLIC BLOOD PRESSURE: 138 MMHG

## 2019-04-02 DIAGNOSIS — M25.561 ACUTE PAIN OF RIGHT KNEE: Primary | ICD-10-CM

## 2019-04-02 PROCEDURE — 3014F SCREEN MAMMO DOC REV: CPT | Performed by: NURSE PRACTITIONER

## 2019-04-02 PROCEDURE — G8427 DOCREV CUR MEDS BY ELIG CLIN: HCPCS | Performed by: NURSE PRACTITIONER

## 2019-04-02 PROCEDURE — 3017F COLORECTAL CA SCREEN DOC REV: CPT | Performed by: NURSE PRACTITIONER

## 2019-04-02 PROCEDURE — 1036F TOBACCO NON-USER: CPT | Performed by: NURSE PRACTITIONER

## 2019-04-02 PROCEDURE — 99213 OFFICE O/P EST LOW 20 MIN: CPT | Performed by: NURSE PRACTITIONER

## 2019-04-02 PROCEDURE — G8417 CALC BMI ABV UP PARAM F/U: HCPCS | Performed by: NURSE PRACTITIONER

## 2019-04-02 ASSESSMENT — ENCOUNTER SYMPTOMS
WHEEZING: 0
COUGH: 0
SHORTNESS OF BREATH: 0
RESPIRATORY NEGATIVE: 1
GASTROINTESTINAL NEGATIVE: 1

## 2019-04-02 ASSESSMENT — PATIENT HEALTH QUESTIONNAIRE - PHQ9
SUM OF ALL RESPONSES TO PHQ9 QUESTIONS 1 & 2: 0
1. LITTLE INTEREST OR PLEASURE IN DOING THINGS: 0
SUM OF ALL RESPONSES TO PHQ QUESTIONS 1-9: 0
SUM OF ALL RESPONSES TO PHQ QUESTIONS 1-9: 0
2. FEELING DOWN, DEPRESSED OR HOPELESS: 0

## 2019-04-02 NOTE — PROGRESS NOTES
SUBJECTIVE:    Shira Martino  1955  61 y.o.  female      Chief Complaint   Patient presents with    Knee Injury     saturday patient turned the wrong way-rt knee with pain and swelling-today is better than has been in the last couple of days    Hypertension     medication was changed in dec-fluid retention is better from changing mediction     HPI     Acute pain of right knee  Pain in knee started Saturday. States turned wrong. Felt like knee 'gave out' a little. Now feeling some grinding in knee. Reports swelling, sore. Today is somewhat better. Right knee. No prior injury. Has been resting, icing and taking it easy. feeling some better today. Current Outpatient Medications on File Prior to Visit   Medication Sig Dispense Refill    verapamil (CALAN SR) 120 MG extended release tablet TAKE 1 TABLET BY MOUTH ONCE DAILY 90 tablet 0    bisoprolol-hydrochlorothiazide (ZIAC) 5-6.25 MG per tablet TAKE 1 TABLET BY MOUTH TWICE DAILY 180 tablet 0    levothyroxine (SYNTHROID) 100 MCG tablet TAKE 1 TABLET BY MOUTH ONCE DAILY 30 tablet 5    hydrochlorothiazide (HYDRODIURIL) 12.5 MG tablet Take 1 tablet by mouth daily 90 tablet 3    bisoprolol-hydrochlorothiazide (ZIAC) 5-6.25 MG per tablet Take 1 tablet by mouth 2 times daily 60 tablet 5     No current facility-administered medications on file prior to visit. Review of PMH, PSH, Family Hx, Allergies and updates made as needed. PHQ Scores 4/2/2019 12/5/2018 9/21/2017 9/14/2016   PHQ2 Score 0 0 0 0   PHQ9 Score 0 0 0 0     Interpretation of Total Score Depression Severity: 1-4 = Minimal depression, 5-9 = Mild depression, 10-14 = Moderate depression, 15-19 = Moderately severe depression, 20-27 = Severe depression    Review of Systems   Constitutional: Negative. Negative for chills, diaphoresis and fever. Respiratory: Negative. Negative for cough, shortness of breath and wheezing. Cardiovascular: Negative.   Negative for chest pain, tablet TAKE 1 TABLET BY MOUTH ONCE DAILY 30 tablet 5    hydrochlorothiazide (HYDRODIURIL) 12.5 MG tablet Take 1 tablet by mouth daily 90 tablet 3    bisoprolol-hydrochlorothiazide (ZIAC) 5-6.25 MG per tablet Take 1 tablet by mouth 2 times daily 60 tablet 5     No current facility-administered medications for this visit. 1. Acute pain of right knee  RICE. FU if not improving in 6 weeks. Can use OTC neoprene sleeve for knee if helpful   - diclofenac (VOLTAREN) 50 MG EC tablet; Take 1 tablet by mouth 3 times daily (with meals)  Dispense: 90 tablet; Refill: 0      Return if symptoms worsen or fail to improve. Controlled substance monitoring (if applicable to pt.  Visit)             (Please note that portions of this note may have beencompleted with a voice recognition program. Efforts were made to edit the dictations but occasionally words are mis-transcribed.)

## 2019-06-03 DIAGNOSIS — I10 ESSENTIAL HYPERTENSION: ICD-10-CM

## 2019-06-05 ENCOUNTER — OFFICE VISIT (OUTPATIENT)
Dept: FAMILY MEDICINE CLINIC | Age: 64
End: 2019-06-05
Payer: COMMERCIAL

## 2019-06-05 VITALS
SYSTOLIC BLOOD PRESSURE: 126 MMHG | DIASTOLIC BLOOD PRESSURE: 70 MMHG | BODY MASS INDEX: 33.78 KG/M2 | WEIGHT: 203 LBS | RESPIRATION RATE: 16 BRPM | HEART RATE: 56 BPM

## 2019-06-05 DIAGNOSIS — M17.10 PRIMARY OSTEOARTHRITIS OF KNEE, UNSPECIFIED LATERALITY: ICD-10-CM

## 2019-06-05 DIAGNOSIS — I10 ESSENTIAL HYPERTENSION: Primary | ICD-10-CM

## 2019-06-05 DIAGNOSIS — E03.9 ACQUIRED HYPOTHYROIDISM: ICD-10-CM

## 2019-06-05 PROBLEM — R60.0 LOCALIZED EDEMA: Status: RESOLVED | Noted: 2018-05-04 | Resolved: 2019-06-05

## 2019-06-05 LAB
ANION GAP SERPL CALCULATED.3IONS-SCNC: 15 MMOL/L (ref 3–16)
BUN BLDV-MCNC: 25 MG/DL (ref 7–20)
CALCIUM SERPL-MCNC: 9.1 MG/DL (ref 8.3–10.6)
CHLORIDE BLD-SCNC: 103 MMOL/L (ref 99–110)
CO2: 24 MMOL/L (ref 21–32)
CREAT SERPL-MCNC: 1.1 MG/DL (ref 0.6–1.2)
GFR AFRICAN AMERICAN: >60
GFR NON-AFRICAN AMERICAN: 50
GLUCOSE BLD-MCNC: 98 MG/DL (ref 70–99)
POTASSIUM SERPL-SCNC: 3.7 MMOL/L (ref 3.5–5.1)
SODIUM BLD-SCNC: 142 MMOL/L (ref 136–145)
T4 FREE: 1.3 NG/DL (ref 0.9–1.8)
TSH REFLEX: 5.05 UIU/ML (ref 0.27–4.2)

## 2019-06-05 PROCEDURE — 99214 OFFICE O/P EST MOD 30 MIN: CPT | Performed by: NURSE PRACTITIONER

## 2019-06-05 RX ORDER — CELECOXIB 100 MG/1
100 CAPSULE ORAL 2 TIMES DAILY
Qty: 60 CAPSULE | Refills: 3 | Status: SHIPPED | OUTPATIENT
Start: 2019-06-05 | End: 2019-09-04

## 2019-06-05 ASSESSMENT — ENCOUNTER SYMPTOMS
ABDOMINAL PAIN: 0
CHEST TIGHTNESS: 0
NAUSEA: 0
EYE PAIN: 0
BLURRED VISION: 0
GASTROINTESTINAL NEGATIVE: 1
SHORTNESS OF BREATH: 0
ORTHOPNEA: 0
COUGH: 0
RESPIRATORY NEGATIVE: 1
EYES NEGATIVE: 1

## 2019-06-05 NOTE — ASSESSMENT & PLAN NOTE
Painful knees. Better with diclofenac. Is concerned about cardiac risk. Discussed using shared decision making- will trial celebrex.

## 2019-06-05 NOTE — PROGRESS NOTES
SUBJECTIVE:    Paulette Spine  1955  61 y.o.  female      Chief Complaint   Patient presents with    6 Month Follow-Up     Pt here for 6 month follow up     Hypertension   This is a new problem. The current episode started in the past 7 days. The problem has been gradually improving since onset. The problem is resistant. Pertinent negatives include no blurred vision, chest pain, headaches, orthopnea, palpitations, peripheral edema, PND or shortness of breath. Essential hypertension  The patient is taking hypertensive medications compliantly without side effects. Denies chest pain, dyspnea, edema, or TIA's. Primary osteoarthritis of knee  Painful knees. Better with diclofenac. Is concerned about cardiac risk. Discussed using shared decision making- will trial celebrex. Acquired hypothyroidism  No symptoms of hypo or hyperthyroidism: no decreased or increased weight, no feeling cold/chilly or excessively warm, no diarrhea or constipation, no undue sweatiness, anxiety or palpitations. Current Outpatient Medications on File Prior to Visit   Medication Sig Dispense Refill    verapamil (CALAN SR) 120 MG extended release tablet TAKE 1 TABLET BY MOUTH ONCE DAILY 90 tablet 0    bisoprolol-hydrochlorothiazide (ZIAC) 5-6.25 MG per tablet TAKE 1 TABLET BY MOUTH TWICE DAILY 180 tablet 0    levothyroxine (SYNTHROID) 100 MCG tablet TAKE 1 TABLET BY MOUTH ONCE DAILY 30 tablet 5    hydrochlorothiazide (HYDRODIURIL) 12.5 MG tablet Take 1 tablet by mouth daily 90 tablet 3    bisoprolol-hydrochlorothiazide (ZIAC) 5-6.25 MG per tablet Take 1 tablet by mouth 2 times daily 60 tablet 5     No current facility-administered medications on file prior to visit. Review of PMH, PSH, Family Hx, Allergies and updates made as needed.     PHQ Scores 4/2/2019 12/5/2018 9/21/2017 9/14/2016   PHQ2 Score 0 0 0 0   PHQ9 Score 0 0 0 0     Interpretation of Total Score Depression Severity: 1-4 = Minimal hypothyroidism  Lab today.     - TSH with Reflex      Return in about 3 months (around 9/5/2019) for HTN. Controlled substance monitoring (if applicable to pt.  Visit)             (Please note that portions of this note may have beencompleted with a voice recognition program. Efforts were made to edit the dictations but occasionally words are mis-transcribed.)

## 2019-06-05 NOTE — ASSESSMENT & PLAN NOTE
No symptoms of hypo or hyperthyroidism: no decreased or increased weight, no feeling cold/chilly or excessively warm, no diarrhea or constipation, no undue sweatiness, anxiety or palpitations.

## 2019-06-07 DIAGNOSIS — R79.89 ELEVATED TSH: Primary | ICD-10-CM

## 2019-06-07 DIAGNOSIS — E03.9 ACQUIRED HYPOTHYROIDISM: ICD-10-CM

## 2019-06-07 RX ORDER — LEVOTHYROXINE SODIUM 112 UG/1
112 TABLET ORAL DAILY
Qty: 30 TABLET | Refills: 2 | Status: SHIPPED | OUTPATIENT
Start: 2019-06-07 | End: 2019-09-04 | Stop reason: SDUPTHER

## 2019-06-17 DIAGNOSIS — I10 ESSENTIAL HYPERTENSION: ICD-10-CM

## 2019-06-17 RX ORDER — BISOPROLOL FUMARATE AND HYDROCHLOROTHIAZIDE 5; 6.25 MG/1; MG/1
TABLET ORAL
Qty: 180 TABLET | Refills: 0 | Status: SHIPPED | OUTPATIENT
Start: 2019-06-17 | End: 2019-09-04 | Stop reason: SDUPTHER

## 2019-06-20 ENCOUNTER — TELEPHONE (OUTPATIENT)
Dept: FAMILY MEDICINE CLINIC | Age: 64
End: 2019-06-20

## 2019-06-25 NOTE — TELEPHONE ENCOUNTER
Patient was on diclofenac. It is not preferred due to cardiac risk and age of patient. diclofenac (VOLTAREN) 50 MG EC tablet   DISCONTINUED   Date: 4/2/2019 Department: [facility] Ordering/Authorizing: DEBORAH Mock - CNP   Outpatient Medication Detail      Disp Refills Start End    diclofenac (VOLTAREN) 50 MG EC tablet (Discontinued) 90 tablet 0 4/2/2019 6/5/2019    Sig - Route:  Take 1 tablet by mouth 3 times daily (with meals) - Oral    Sent to pharmacy as: Diclofenac Sodium 50 MG Oral Tablet Delayed Release    E-Prescribing Status: Receipt confirmed by pharmacy (4/2/2019  8:07 AM EDT)

## 2019-06-27 RX ORDER — ETODOLAC 400 MG/1
400 TABLET, FILM COATED ORAL 2 TIMES DAILY
Qty: 60 TABLET | Refills: 3 | Status: SHIPPED | OUTPATIENT
Start: 2019-06-27 | End: 2019-07-02 | Stop reason: CLARIF

## 2019-06-27 NOTE — TELEPHONE ENCOUNTER
Insurance will typacally insist of failing 2 NSAIDS. Do you want to try 1 more, before attempting appeal?    Please advise.

## 2019-06-27 NOTE — TELEPHONE ENCOUNTER
Please advise patient celebrex not covered. Will send different medicatoin to try. Thanks. Sending etodolac, (lodine)  Thank you!

## 2019-07-02 NOTE — TELEPHONE ENCOUNTER
Spoke with pt, who informs she filled generic Celebrex Rx, which was cheaper than Lodine Rx. Not sure if pt paid out of pocket for Celebrex, or if it was somehow covered (PA was denied). Per PCP, Lodine Canceled and Celebrex reentered to chart.      PA closed

## 2019-09-04 ENCOUNTER — OFFICE VISIT (OUTPATIENT)
Dept: FAMILY MEDICINE CLINIC | Age: 64
End: 2019-09-04
Payer: COMMERCIAL

## 2019-09-04 VITALS
HEART RATE: 51 BPM | BODY MASS INDEX: 34.52 KG/M2 | RESPIRATION RATE: 16 BRPM | DIASTOLIC BLOOD PRESSURE: 78 MMHG | SYSTOLIC BLOOD PRESSURE: 138 MMHG | WEIGHT: 207.2 LBS | HEIGHT: 65 IN

## 2019-09-04 DIAGNOSIS — G47.33 OSA ON CPAP: ICD-10-CM

## 2019-09-04 DIAGNOSIS — I10 ESSENTIAL HYPERTENSION: Primary | ICD-10-CM

## 2019-09-04 DIAGNOSIS — E03.9 ACQUIRED HYPOTHYROIDISM: ICD-10-CM

## 2019-09-04 DIAGNOSIS — G47.19 EXCESSIVE DAYTIME SLEEPINESS: ICD-10-CM

## 2019-09-04 DIAGNOSIS — Z99.89 OSA ON CPAP: ICD-10-CM

## 2019-09-04 DIAGNOSIS — D17.1 LIPOMA OF TORSO: ICD-10-CM

## 2019-09-04 PROCEDURE — 99214 OFFICE O/P EST MOD 30 MIN: CPT | Performed by: NURSE PRACTITIONER

## 2019-09-04 RX ORDER — LEVOTHYROXINE SODIUM 112 UG/1
112 TABLET ORAL DAILY
Qty: 30 TABLET | Refills: 2 | Status: SHIPPED | OUTPATIENT
Start: 2019-09-04 | End: 2019-12-12 | Stop reason: SDUPTHER

## 2019-09-04 RX ORDER — BISOPROLOL FUMARATE AND HYDROCHLOROTHIAZIDE 5; 6.25 MG/1; MG/1
TABLET ORAL
Qty: 180 TABLET | Refills: 1 | Status: SHIPPED | OUTPATIENT
Start: 2019-09-04 | End: 2019-11-04 | Stop reason: ALTCHOICE

## 2019-09-04 RX ORDER — HYDROCHLOROTHIAZIDE 12.5 MG/1
TABLET ORAL
Qty: 90 TABLET | Refills: 1 | Status: SHIPPED | OUTPATIENT
Start: 2019-09-04 | End: 2019-11-05 | Stop reason: ALTCHOICE

## 2019-09-04 ASSESSMENT — ENCOUNTER SYMPTOMS
SHORTNESS OF BREATH: 0
RESPIRATORY NEGATIVE: 1
COUGH: 0
EYES NEGATIVE: 1
GASTROINTESTINAL NEGATIVE: 1
ABDOMINAL PAIN: 0
CHEST TIGHTNESS: 0
EYE PAIN: 0
NAUSEA: 0

## 2019-11-04 ENCOUNTER — OFFICE VISIT (OUTPATIENT)
Dept: FAMILY MEDICINE CLINIC | Age: 64
End: 2019-11-04
Payer: COMMERCIAL

## 2019-11-04 VITALS
RESPIRATION RATE: 16 BRPM | SYSTOLIC BLOOD PRESSURE: 150 MMHG | DIASTOLIC BLOOD PRESSURE: 86 MMHG | OXYGEN SATURATION: 95 % | BODY MASS INDEX: 34.65 KG/M2 | HEART RATE: 69 BPM | WEIGHT: 208.2 LBS

## 2019-11-04 DIAGNOSIS — R07.9 CHEST PAIN, UNSPECIFIED TYPE: Primary | ICD-10-CM

## 2019-11-04 DIAGNOSIS — Z51.81 MEDICATION MONITORING ENCOUNTER: ICD-10-CM

## 2019-11-04 DIAGNOSIS — R00.2 PALPITATIONS: ICD-10-CM

## 2019-11-04 DIAGNOSIS — I10 ESSENTIAL HYPERTENSION: ICD-10-CM

## 2019-11-04 DIAGNOSIS — R07.9 CHEST PAIN, UNSPECIFIED TYPE: ICD-10-CM

## 2019-11-04 DIAGNOSIS — Z13.220 LIPID SCREENING: ICD-10-CM

## 2019-11-04 DIAGNOSIS — E03.9 ACQUIRED HYPOTHYROIDISM: ICD-10-CM

## 2019-11-04 DIAGNOSIS — R60.9 PERIPHERAL EDEMA: ICD-10-CM

## 2019-11-04 LAB
A/G RATIO: 2.7 (ref 1.1–2.2)
ALBUMIN SERPL-MCNC: 5.1 G/DL (ref 3.4–5)
ALP BLD-CCNC: 64 U/L (ref 40–129)
ALT SERPL-CCNC: 15 U/L (ref 10–40)
ANION GAP SERPL CALCULATED.3IONS-SCNC: 19 MMOL/L (ref 3–16)
AST SERPL-CCNC: 22 U/L (ref 15–37)
BASOPHILS ABSOLUTE: 0 K/UL (ref 0–0.2)
BASOPHILS RELATIVE PERCENT: 0.7 %
BILIRUB SERPL-MCNC: 0.6 MG/DL (ref 0–1)
BUN BLDV-MCNC: 40 MG/DL (ref 7–20)
CALCIUM SERPL-MCNC: 10 MG/DL (ref 8.3–10.6)
CHLORIDE BLD-SCNC: 104 MMOL/L (ref 99–110)
CHOLESTEROL, TOTAL: 219 MG/DL (ref 0–199)
CO2: 23 MMOL/L (ref 21–32)
CREAT SERPL-MCNC: 1 MG/DL (ref 0.6–1.2)
EOSINOPHILS ABSOLUTE: 0.1 K/UL (ref 0–0.6)
EOSINOPHILS RELATIVE PERCENT: 1.7 %
GFR AFRICAN AMERICAN: >60
GFR NON-AFRICAN AMERICAN: 56
GLOBULIN: 1.9 G/DL
GLUCOSE BLD-MCNC: 97 MG/DL (ref 70–99)
HCT VFR BLD CALC: 42.4 % (ref 36–48)
HDLC SERPL-MCNC: 61 MG/DL (ref 40–60)
HEMOGLOBIN: 14.5 G/DL (ref 12–16)
LDL CHOLESTEROL CALCULATED: 123 MG/DL
LYMPHOCYTES ABSOLUTE: 1.6 K/UL (ref 1–5.1)
LYMPHOCYTES RELATIVE PERCENT: 27.2 %
MAGNESIUM: 2.2 MG/DL (ref 1.8–2.4)
MCH RBC QN AUTO: 31.4 PG (ref 26–34)
MCHC RBC AUTO-ENTMCNC: 34.2 G/DL (ref 31–36)
MCV RBC AUTO: 91.8 FL (ref 80–100)
MONOCYTES ABSOLUTE: 0.3 K/UL (ref 0–1.3)
MONOCYTES RELATIVE PERCENT: 5.3 %
NEUTROPHILS ABSOLUTE: 3.9 K/UL (ref 1.7–7.7)
NEUTROPHILS RELATIVE PERCENT: 65.1 %
PDW BLD-RTO: 13.3 % (ref 12.4–15.4)
PLATELET # BLD: 187 K/UL (ref 135–450)
PMV BLD AUTO: 11.7 FL (ref 5–10.5)
POTASSIUM SERPL-SCNC: 3.9 MMOL/L (ref 3.5–5.1)
PRO-BNP: 382 PG/ML (ref 0–124)
RBC # BLD: 4.62 M/UL (ref 4–5.2)
SODIUM BLD-SCNC: 146 MMOL/L (ref 136–145)
TOTAL PROTEIN: 7 G/DL (ref 6.4–8.2)
TRIGL SERPL-MCNC: 173 MG/DL (ref 0–150)
TSH REFLEX: 1.82 UIU/ML (ref 0.27–4.2)
VLDLC SERPL CALC-MCNC: 35 MG/DL
WBC # BLD: 5.9 K/UL (ref 4–11)

## 2019-11-04 PROCEDURE — 99214 OFFICE O/P EST MOD 30 MIN: CPT | Performed by: NURSE PRACTITIONER

## 2019-11-04 PROCEDURE — 93000 ELECTROCARDIOGRAM COMPLETE: CPT | Performed by: NURSE PRACTITIONER

## 2019-11-04 RX ORDER — METOPROLOL TARTRATE 50 MG/1
50 TABLET, FILM COATED ORAL 2 TIMES DAILY
Qty: 60 TABLET | Refills: 5 | Status: SHIPPED | OUTPATIENT
Start: 2019-11-04 | End: 2020-06-02

## 2019-11-04 ASSESSMENT — ENCOUNTER SYMPTOMS
NAUSEA: 0
SHORTNESS OF BREATH: 1
ABDOMINAL PAIN: 0
CHEST TIGHTNESS: 1
VOMITING: 0
DIARRHEA: 0
COUGH: 0
CONSTIPATION: 0
WHEEZING: 0

## 2019-11-05 ENCOUNTER — TELEPHONE (OUTPATIENT)
Dept: FAMILY MEDICINE CLINIC | Age: 64
End: 2019-11-05

## 2019-11-05 DIAGNOSIS — R79.89 ELEVATED BRAIN NATRIURETIC PEPTIDE (BNP) LEVEL: Primary | ICD-10-CM

## 2019-11-05 RX ORDER — FUROSEMIDE 20 MG/1
20 TABLET ORAL DAILY
Qty: 7 TABLET | Refills: 0 | Status: SHIPPED | OUTPATIENT
Start: 2019-11-05 | End: 2019-11-12 | Stop reason: SDUPTHER

## 2019-11-06 ENCOUNTER — OFFICE VISIT (OUTPATIENT)
Dept: CARDIOLOGY CLINIC | Age: 64
End: 2019-11-06
Payer: COMMERCIAL

## 2019-11-06 VITALS — HEART RATE: 60 BPM | SYSTOLIC BLOOD PRESSURE: 138 MMHG | DIASTOLIC BLOOD PRESSURE: 88 MMHG | RESPIRATION RATE: 16 BRPM

## 2019-11-06 DIAGNOSIS — R00.2 PALPITATIONS: ICD-10-CM

## 2019-11-11 ENCOUNTER — OFFICE VISIT (OUTPATIENT)
Dept: FAMILY MEDICINE CLINIC | Age: 64
End: 2019-11-11
Payer: COMMERCIAL

## 2019-11-11 VITALS
WEIGHT: 212.2 LBS | RESPIRATION RATE: 16 BRPM | HEART RATE: 53 BPM | DIASTOLIC BLOOD PRESSURE: 82 MMHG | SYSTOLIC BLOOD PRESSURE: 130 MMHG | BODY MASS INDEX: 35.31 KG/M2

## 2019-11-11 DIAGNOSIS — R51.9 NEW ONSET OF HEADACHES AFTER AGE 50: ICD-10-CM

## 2019-11-11 DIAGNOSIS — R89.9 ABNORMAL LABORATORY TEST RESULT: ICD-10-CM

## 2019-11-11 DIAGNOSIS — R51.9 NEW ONSET OF HEADACHES AFTER AGE 50: Primary | ICD-10-CM

## 2019-11-11 DIAGNOSIS — R06.02 SOB (SHORTNESS OF BREATH) ON EXERTION: ICD-10-CM

## 2019-11-11 DIAGNOSIS — R94.31 ABNORMAL EKG: ICD-10-CM

## 2019-11-11 LAB
A/G RATIO: 2 (ref 1.1–2.2)
ALBUMIN SERPL-MCNC: 4.7 G/DL (ref 3.4–5)
ALP BLD-CCNC: 59 U/L (ref 40–129)
ALT SERPL-CCNC: 13 U/L (ref 10–40)
ANION GAP SERPL CALCULATED.3IONS-SCNC: 15 MMOL/L (ref 3–16)
AST SERPL-CCNC: 15 U/L (ref 15–37)
BILIRUB SERPL-MCNC: 0.4 MG/DL (ref 0–1)
BUN BLDV-MCNC: 19 MG/DL (ref 7–20)
C-REACTIVE PROTEIN: 1.2 MG/L (ref 0–5.1)
CALCIUM SERPL-MCNC: 9.9 MG/DL (ref 8.3–10.6)
CHLORIDE BLD-SCNC: 103 MMOL/L (ref 99–110)
CO2: 25 MMOL/L (ref 21–32)
CREAT SERPL-MCNC: 1 MG/DL (ref 0.6–1.2)
GFR AFRICAN AMERICAN: >60
GFR NON-AFRICAN AMERICAN: 56
GLOBULIN: 2.3 G/DL
GLUCOSE BLD-MCNC: 79 MG/DL (ref 70–99)
POTASSIUM SERPL-SCNC: 3.8 MMOL/L (ref 3.5–5.1)
PRO-BNP: 525 PG/ML (ref 0–124)
SEDIMENTATION RATE, ERYTHROCYTE: 13 MM/HR (ref 0–30)
SODIUM BLD-SCNC: 143 MMOL/L (ref 136–145)
TOTAL PROTEIN: 7 G/DL (ref 6.4–8.2)

## 2019-11-11 PROCEDURE — 99214 OFFICE O/P EST MOD 30 MIN: CPT | Performed by: NURSE PRACTITIONER

## 2019-11-11 PROCEDURE — 93000 ELECTROCARDIOGRAM COMPLETE: CPT | Performed by: NURSE PRACTITIONER

## 2019-11-11 ASSESSMENT — ENCOUNTER SYMPTOMS
COUGH: 0
CONSTIPATION: 0
WHEEZING: 0
ABDOMINAL PAIN: 0
SHORTNESS OF BREATH: 1
VOMITING: 0
DIARRHEA: 0
CHEST TIGHTNESS: 0
NAUSEA: 0

## 2019-11-11 ASSESSMENT — PATIENT HEALTH QUESTIONNAIRE - PHQ9
SUM OF ALL RESPONSES TO PHQ QUESTIONS 1-9: 0
1. LITTLE INTEREST OR PLEASURE IN DOING THINGS: 0
2. FEELING DOWN, DEPRESSED OR HOPELESS: 0
SUM OF ALL RESPONSES TO PHQ9 QUESTIONS 1 & 2: 0
SUM OF ALL RESPONSES TO PHQ QUESTIONS 1-9: 0

## 2019-11-12 DIAGNOSIS — R00.2 PALPITATION: Primary | ICD-10-CM

## 2019-11-12 DIAGNOSIS — R79.89 ELEVATED BRAIN NATRIURETIC PEPTIDE (BNP) LEVEL: ICD-10-CM

## 2019-11-12 RX ORDER — FUROSEMIDE 20 MG/1
20 TABLET ORAL DAILY
Qty: 30 TABLET | Refills: 0 | Status: SHIPPED | OUTPATIENT
Start: 2019-11-12 | End: 2020-12-11 | Stop reason: ALTCHOICE

## 2019-11-13 ENCOUNTER — OFFICE VISIT (OUTPATIENT)
Dept: CARDIOLOGY CLINIC | Age: 64
End: 2019-11-13
Payer: COMMERCIAL

## 2019-11-13 ENCOUNTER — HOSPITAL ENCOUNTER (OUTPATIENT)
Age: 64
Discharge: HOME | End: 2019-11-13
Payer: COMMERCIAL

## 2019-11-13 VITALS
WEIGHT: 211 LBS | HEART RATE: 60 BPM | HEIGHT: 64 IN | BODY MASS INDEX: 36.02 KG/M2 | SYSTOLIC BLOOD PRESSURE: 144 MMHG | DIASTOLIC BLOOD PRESSURE: 90 MMHG

## 2019-11-13 DIAGNOSIS — R06.02 SHORTNESS OF BREATH: ICD-10-CM

## 2019-11-13 DIAGNOSIS — R06.02 SHORTNESS OF BREATH: Primary | ICD-10-CM

## 2019-11-13 LAB
ANION GAP SERPL CALCULATED.3IONS-SCNC: 19 MMOL/L (ref 4–16)
BUN BLDV-MCNC: 20 MG/DL (ref 6–23)
CALCIUM SERPL-MCNC: 9.6 MG/DL (ref 8.3–10.6)
CHLORIDE BLD-SCNC: 102 MMOL/L (ref 99–110)
CO2: 22 MMOL/L (ref 21–32)
CREAT SERPL-MCNC: 1.1 MG/DL (ref 0.6–1.1)
GFR AFRICAN AMERICAN: >60 ML/MIN/1.73M2
GFR NON-AFRICAN AMERICAN: 50 ML/MIN/1.73M2
GLUCOSE BLD-MCNC: 103 MG/DL (ref 70–99)
POTASSIUM SERPL-SCNC: 4.2 MMOL/L (ref 3.5–5.1)
SODIUM BLD-SCNC: 143 MMOL/L (ref 135–145)

## 2019-11-13 PROCEDURE — 99244 OFF/OP CNSLTJ NEW/EST MOD 40: CPT | Performed by: INTERNAL MEDICINE

## 2019-11-13 PROCEDURE — 80048 BASIC METABOLIC PNL TOTAL CA: CPT

## 2019-11-13 PROCEDURE — 93226 XTRNL ECG REC<48 HR SCAN A/R: CPT | Performed by: INTERNAL MEDICINE

## 2019-11-13 PROCEDURE — 36415 COLL VENOUS BLD VENIPUNCTURE: CPT

## 2019-11-13 RX ORDER — LISINOPRIL 10 MG/1
10 TABLET ORAL DAILY
Qty: 90 TABLET | Refills: 1 | Status: SHIPPED | OUTPATIENT
Start: 2019-11-13 | End: 2019-11-27

## 2019-11-20 ENCOUNTER — TELEPHONE (OUTPATIENT)
Dept: CARDIOLOGY CLINIC | Age: 64
End: 2019-11-20

## 2019-11-21 ENCOUNTER — OFFICE VISIT (OUTPATIENT)
Dept: FAMILY MEDICINE CLINIC | Age: 64
End: 2019-11-21
Payer: COMMERCIAL

## 2019-11-21 VITALS
HEART RATE: 61 BPM | TEMPERATURE: 98.3 F | WEIGHT: 214 LBS | RESPIRATION RATE: 16 BRPM | SYSTOLIC BLOOD PRESSURE: 132 MMHG | OXYGEN SATURATION: 98 % | BODY MASS INDEX: 36.73 KG/M2 | DIASTOLIC BLOOD PRESSURE: 70 MMHG

## 2019-11-21 DIAGNOSIS — Z99.89 OSA ON CPAP: ICD-10-CM

## 2019-11-21 DIAGNOSIS — I10 ESSENTIAL HYPERTENSION: Primary | ICD-10-CM

## 2019-11-21 DIAGNOSIS — G47.33 OSA ON CPAP: ICD-10-CM

## 2019-11-21 LAB
A/G RATIO: 1.9 (ref 1.1–2.2)
ALBUMIN SERPL-MCNC: 4.3 G/DL (ref 3.4–5)
ALP BLD-CCNC: 61 U/L (ref 40–129)
ALT SERPL-CCNC: 12 U/L (ref 10–40)
ANION GAP SERPL CALCULATED.3IONS-SCNC: 14 MMOL/L (ref 3–16)
AST SERPL-CCNC: 13 U/L (ref 15–37)
BILIRUB SERPL-MCNC: <0.2 MG/DL (ref 0–1)
BUN BLDV-MCNC: 19 MG/DL (ref 7–20)
CALCIUM SERPL-MCNC: 9.2 MG/DL (ref 8.3–10.6)
CHLORIDE BLD-SCNC: 104 MMOL/L (ref 99–110)
CO2: 27 MMOL/L (ref 21–32)
CREAT SERPL-MCNC: 1 MG/DL (ref 0.6–1.2)
GFR AFRICAN AMERICAN: >60
GFR NON-AFRICAN AMERICAN: 56
GLOBULIN: 2.3 G/DL
GLUCOSE BLD-MCNC: 110 MG/DL (ref 70–99)
MAGNESIUM: 2.3 MG/DL (ref 1.8–2.4)
POTASSIUM SERPL-SCNC: 3.5 MMOL/L (ref 3.5–5.1)
PRO-BNP: 419 PG/ML (ref 0–124)
SODIUM BLD-SCNC: 145 MMOL/L (ref 136–145)
SPO2: 98 %
TOTAL PROTEIN: 6.6 G/DL (ref 6.4–8.2)

## 2019-11-21 PROCEDURE — 99214 OFFICE O/P EST MOD 30 MIN: CPT | Performed by: NURSE PRACTITIONER

## 2019-11-21 ASSESSMENT — ENCOUNTER SYMPTOMS
TROUBLE SWALLOWING: 0
CHEST TIGHTNESS: 0
BACK PAIN: 0
BLOOD IN STOOL: 0
PHOTOPHOBIA: 0
RHINORRHEA: 0
WHEEZING: 0
VOMITING: 0
ABDOMINAL PAIN: 0
SORE THROAT: 0
SHORTNESS OF BREATH: 1
COUGH: 1
EYE PAIN: 0
NAUSEA: 0
DIARRHEA: 0
SINUS PRESSURE: 0
SINUS PAIN: 0
CONSTIPATION: 0

## 2019-11-22 ENCOUNTER — PROCEDURE VISIT (OUTPATIENT)
Dept: CARDIOLOGY CLINIC | Age: 64
End: 2019-11-22
Payer: COMMERCIAL

## 2019-11-22 DIAGNOSIS — R07.9 CHEST PAIN, UNSPECIFIED TYPE: Primary | ICD-10-CM

## 2019-11-22 DIAGNOSIS — R06.02 SHORTNESS OF BREATH: ICD-10-CM

## 2019-11-22 LAB
LV EF: 64 %
LVEF MODALITY: NORMAL

## 2019-11-22 PROCEDURE — 93016 CV STRESS TEST SUPVJ ONLY: CPT | Performed by: INTERNAL MEDICINE

## 2019-11-22 PROCEDURE — 93017 CV STRESS TEST TRACING ONLY: CPT | Performed by: INTERNAL MEDICINE

## 2019-11-22 PROCEDURE — 93018 CV STRESS TEST I&R ONLY: CPT | Performed by: INTERNAL MEDICINE

## 2019-11-22 PROCEDURE — 78452 HT MUSCLE IMAGE SPECT MULT: CPT | Performed by: INTERNAL MEDICINE

## 2019-11-22 PROCEDURE — A9500 TC99M SESTAMIBI: HCPCS | Performed by: INTERNAL MEDICINE

## 2019-11-25 ENCOUNTER — TELEPHONE (OUTPATIENT)
Dept: CARDIOLOGY CLINIC | Age: 64
End: 2019-11-25

## 2019-11-27 ENCOUNTER — OFFICE VISIT (OUTPATIENT)
Dept: CARDIOLOGY CLINIC | Age: 64
End: 2019-11-27
Payer: COMMERCIAL

## 2019-11-27 VITALS
BODY MASS INDEX: 36.22 KG/M2 | WEIGHT: 211 LBS | RESPIRATION RATE: 16 BRPM | DIASTOLIC BLOOD PRESSURE: 80 MMHG | HEART RATE: 60 BPM | SYSTOLIC BLOOD PRESSURE: 148 MMHG

## 2019-11-27 DIAGNOSIS — R00.2 PALPITATIONS: Primary | ICD-10-CM

## 2019-11-27 PROCEDURE — 99214 OFFICE O/P EST MOD 30 MIN: CPT | Performed by: INTERNAL MEDICINE

## 2019-11-27 RX ORDER — LISINOPRIL 10 MG/1
20 TABLET ORAL DAILY
Qty: 180 TABLET | Refills: 1 | Status: SHIPPED | OUTPATIENT
Start: 2019-11-27 | End: 2020-08-17 | Stop reason: SINTOL

## 2019-11-29 ENCOUNTER — PROCEDURE VISIT (OUTPATIENT)
Dept: CARDIOLOGY CLINIC | Age: 64
End: 2019-11-29
Payer: COMMERCIAL

## 2019-11-29 DIAGNOSIS — R79.89 ELEVATED BRAIN NATRIURETIC PEPTIDE (BNP) LEVEL: Primary | ICD-10-CM

## 2019-11-29 LAB
LV EF: 58 %
LVEF MODALITY: NORMAL

## 2019-11-29 PROCEDURE — 93306 TTE W/DOPPLER COMPLETE: CPT | Performed by: INTERNAL MEDICINE

## 2019-12-06 ENCOUNTER — TELEPHONE (OUTPATIENT)
Dept: FAMILY MEDICINE CLINIC | Age: 64
End: 2019-12-06

## 2019-12-06 ENCOUNTER — OFFICE VISIT (OUTPATIENT)
Dept: FAMILY MEDICINE CLINIC | Age: 64
End: 2019-12-06
Payer: COMMERCIAL

## 2019-12-06 VITALS
WEIGHT: 211.13 LBS | BODY MASS INDEX: 35.18 KG/M2 | HEART RATE: 58 BPM | HEIGHT: 65 IN | OXYGEN SATURATION: 97 % | SYSTOLIC BLOOD PRESSURE: 138 MMHG | DIASTOLIC BLOOD PRESSURE: 80 MMHG | RESPIRATION RATE: 16 BRPM

## 2019-12-06 DIAGNOSIS — Z12.31 ENCOUNTER FOR SCREENING MAMMOGRAM FOR BREAST CANCER: ICD-10-CM

## 2019-12-06 DIAGNOSIS — R79.89 ELEVATED BRAIN NATRIURETIC PEPTIDE (BNP) LEVEL: Primary | ICD-10-CM

## 2019-12-06 DIAGNOSIS — Z23 NEED FOR PROPHYLACTIC VACCINATION AGAINST DIPHTHERIA-TETANUS-PERTUSSIS (DTP): ICD-10-CM

## 2019-12-06 LAB
ANION GAP SERPL CALCULATED.3IONS-SCNC: 14 MMOL/L (ref 3–16)
BUN BLDV-MCNC: 22 MG/DL (ref 7–20)
CALCIUM SERPL-MCNC: 9.6 MG/DL (ref 8.3–10.6)
CHLORIDE BLD-SCNC: 103 MMOL/L (ref 99–110)
CO2: 26 MMOL/L (ref 21–32)
CREAT SERPL-MCNC: 1 MG/DL (ref 0.6–1.2)
GFR AFRICAN AMERICAN: >60
GFR NON-AFRICAN AMERICAN: 56
GLUCOSE BLD-MCNC: 93 MG/DL (ref 70–99)
POTASSIUM SERPL-SCNC: 4.4 MMOL/L (ref 3.5–5.1)
PRO-BNP: 312 PG/ML (ref 0–124)
SODIUM BLD-SCNC: 143 MMOL/L (ref 136–145)

## 2019-12-06 PROCEDURE — 99214 OFFICE O/P EST MOD 30 MIN: CPT | Performed by: NURSE PRACTITIONER

## 2019-12-06 RX ORDER — FUROSEMIDE 20 MG/1
20 TABLET ORAL DAILY
Qty: 30 TABLET | Refills: 0 | Status: CANCELLED | OUTPATIENT
Start: 2019-12-06

## 2019-12-06 ASSESSMENT — ENCOUNTER SYMPTOMS
BLOOD IN STOOL: 0
PHOTOPHOBIA: 0
SORE THROAT: 0
SINUS PRESSURE: 0
VOMITING: 0
SHORTNESS OF BREATH: 0
DIARRHEA: 0
SINUS PAIN: 0
BACK PAIN: 0
RHINORRHEA: 0
NAUSEA: 0
CONSTIPATION: 0
CHEST TIGHTNESS: 0
TROUBLE SWALLOWING: 0
COUGH: 0
EYE PAIN: 0
ABDOMINAL PAIN: 0
WHEEZING: 0

## 2019-12-06 ASSESSMENT — PATIENT HEALTH QUESTIONNAIRE - PHQ9
2. FEELING DOWN, DEPRESSED OR HOPELESS: 0
SUM OF ALL RESPONSES TO PHQ QUESTIONS 1-9: 0
1. LITTLE INTEREST OR PLEASURE IN DOING THINGS: 0
SUM OF ALL RESPONSES TO PHQ QUESTIONS 1-9: 0
SUM OF ALL RESPONSES TO PHQ9 QUESTIONS 1 & 2: 0

## 2019-12-12 DIAGNOSIS — E03.9 ACQUIRED HYPOTHYROIDISM: ICD-10-CM

## 2019-12-12 RX ORDER — LEVOTHYROXINE SODIUM 112 UG/1
TABLET ORAL
Qty: 30 TABLET | Refills: 2 | Status: SHIPPED | OUTPATIENT
Start: 2019-12-12 | End: 2020-03-23

## 2020-01-23 ENCOUNTER — OFFICE VISIT (OUTPATIENT)
Dept: FAMILY MEDICINE CLINIC | Age: 65
End: 2020-01-23
Payer: COMMERCIAL

## 2020-01-23 VITALS
SYSTOLIC BLOOD PRESSURE: 138 MMHG | OXYGEN SATURATION: 98 % | TEMPERATURE: 98 F | HEART RATE: 50 BPM | DIASTOLIC BLOOD PRESSURE: 80 MMHG | WEIGHT: 213 LBS | RESPIRATION RATE: 16 BRPM | BODY MASS INDEX: 35.45 KG/M2

## 2020-01-23 PROCEDURE — 99396 PREV VISIT EST AGE 40-64: CPT | Performed by: NURSE PRACTITIONER

## 2020-01-23 ASSESSMENT — PATIENT HEALTH QUESTIONNAIRE - PHQ9
2. FEELING DOWN, DEPRESSED OR HOPELESS: 0
1. LITTLE INTEREST OR PLEASURE IN DOING THINGS: 0
SUM OF ALL RESPONSES TO PHQ QUESTIONS 1-9: 0
SUM OF ALL RESPONSES TO PHQ9 QUESTIONS 1 & 2: 0
SUM OF ALL RESPONSES TO PHQ QUESTIONS 1-9: 0

## 2020-01-23 NOTE — PROGRESS NOTES
1/25/20    Chief Complaint   Patient presents with    Gynecologic Exam    Nevus     patient has a few small moles on her hand and her back-just wants them looked at   Earl Guzman Hypertension    Hypothyroidism       Laury Soria, (1955), is a 59 y.o. female, is here for evaluation of the following medical concerns:    HPI  Annual Pelvic Exam:  Menopause age 48  Abnormal PAP in 30's, has not had PAP in 4 years ago which was normal  Denies vaginal bleeding  Some vaginal dryness, does not interfere intercourse    Breast exam:  Does not do monthly self-exam  Denies abnormal mammos in the past.   Denies known lumps, bumps or nipple drainage. SOB:  Denies sob at this time, only rare palpitations     Hypertension:  150/90's at home  She is noticing worsening cough, but feels congestion, will monitor  Edema intermittently, depending on sodium    Review of Systems    Prior to Visit Medications    Medication Sig Taking? Authorizing Provider   EUTHYROX 112 MCG tablet TAKE 1 TABLET BY MOUTH ONCE DAILY Yes Tiffanie Diaz PA-C   lisinopril (PRINIVIL;ZESTRIL) 10 MG tablet Take 2 tablets by mouth daily Yes Shayy Flor MD   metoprolol tartrate (LOPRESSOR) 50 MG tablet Take 1 tablet by mouth 2 times daily Yes DEBORAH Lyons CNP   verapamil (CALAN SR) 120 MG extended release tablet TAKE 1 TABLET BY MOUTH ONCE DAILY Yes DEBORAH Marroquin CNP   UNABLE TO FIND Home blood pressure monitor and cuff  DEBORAH Lugo CNP   furosemide (LASIX) 20 MG tablet Take 1 tablet by mouth daily  DEBORAH Lyons CNP        Social History     Tobacco Use    Smoking status: Never Smoker    Smokeless tobacco: Never Used   Substance Use Topics    Alcohol use:  Yes    Drug use: No        Lab Results   Component Value Date    WBC 5.9 11/04/2019    HGB 14.5 11/04/2019    HCT 42.4 11/04/2019    MCV 91.8 11/04/2019     11/04/2019     Lab Results   Component Value Date    LABA1C 5.0 06/05/2018     Lab Results   Component Value Date    TSH 2.58 09/14/2016     Lab Results   Component Value Date    CHOL 219 (H) 11/04/2019    TRIG 173 (H) 11/04/2019    HDL 61 (H) 11/04/2019    LDLCALC 123 (H) 11/04/2019    LABVLDL 35 11/04/2019       Wt Readings from Last 3 Encounters:   01/23/20 213 lb (96.6 kg)   12/06/19 211 lb 2 oz (95.8 kg)   11/27/19 211 lb (95.7 kg)       Wt Readings from Last 3 Encounters:   01/23/20 213 lb (96.6 kg)   12/06/19 211 lb 2 oz (95.8 kg)   11/27/19 211 lb (95.7 kg)     Temp Readings from Last 3 Encounters:   01/23/20 98 °F (36.7 °C)   11/21/19 98.3 °F (36.8 °C) (Oral)   12/05/18 98.5 °F (36.9 °C) (Temporal)     BP Readings from Last 3 Encounters:   01/23/20 138/80   12/06/19 138/80   11/27/19 (!) 148/80     Pulse Readings from Last 3 Encounters:   01/23/20 50   12/06/19 58   11/27/19 60        Results for orders placed or performed in visit on 01/23/20   VAGINAL PATHOGENS PROBE *A   Result Value Ref Range    Trichomonas Vaginalis DNA       Negative- DNA not detected. Normal range: Negative DNA not detected. GARDNERELLA VAGINALIS, DNA PROBE       Negative- DNA not detected. Normal range: Negative DNA not detected. MYNOR SPECIES, DNA PROBE       Negative- DNA not detected. Normal range: Negative DNA not detected. Human papillomavirus (HPV) DNA probe thin prep high risk   Result Value Ref Range    HPV Comment See below        Physical Exam  Vitals signs and nursing note reviewed. Exam conducted with a chaperone present. Constitutional:       General: She is not in acute distress. Appearance: Normal appearance. She is normal weight. She is not ill-appearing, toxic-appearing or diaphoretic. HENT:      Head: Normocephalic and atraumatic. Neck:      Musculoskeletal: Normal range of motion and neck supple. Cardiovascular:      Rate and Rhythm: Normal rate and regular rhythm. Pulmonary:      Effort: Pulmonary effort is normal. No respiratory distress.    Chest:      Chest wall: No mass, deformity, swelling, tenderness or edema. Breasts: Breasts are symmetrical.         Right: Normal. No swelling, bleeding, inverted nipple, mass, nipple discharge, skin change or tenderness. Left: Normal. No swelling, bleeding, inverted nipple, mass, nipple discharge, skin change or tenderness. Comments: Multiple moles noted across chest and breasts. Denies known changes. Abdominal:      Hernia: There is no hernia in the right inguinal area or left inguinal area. Genitourinary:     General: Normal vulva. Exam position: Lithotomy position. Pubic Area: No rash or pubic lice. Labia:         Right: No rash, tenderness, lesion or injury. Left: No rash, tenderness, lesion or injury. Urethra: No prolapse, urethral pain, urethral swelling or urethral lesion. Vagina: Normal. No signs of injury and foreign body. No vaginal discharge, erythema, tenderness, bleeding, lesions or prolapsed vaginal walls. Cervix: Friability present. No cervical motion tenderness, discharge, lesion, erythema, cervical bleeding or eversion. Uterus: Normal. Not enlarged, not tender and no uterine prolapse. Adnexa:         Right: No mass, tenderness or fullness. Left: No mass, tenderness or fullness. Rectum: Normal.   Lymphadenopathy:      Upper Body:      Right upper body: No supraclavicular, axillary or pectoral adenopathy. Left upper body: No supraclavicular, axillary or pectoral adenopathy. Lower Body: No right inguinal adenopathy. No left inguinal adenopathy. Neurological:      Mental Status: She is alert. ASSESSMENT AND PLAN:    1. Pap test, as part of routine gynecological examination  Education provided on vaginal dryness, and to report any post-menopausal bleeding as this can be a sign of malignant issues. She verbalizes understanding.     - PAP SMEAR  - VAGINAL PATHOGENS PROBE *A    2. Screening for vaginal cancer  - PAP SMEAR    3. Nevus  Multiple freckles and moles noted. Recommended dermatology consult for evaluation.   - Amb External Referral To Plastic Surgery         Return in about 3 months (around 4/23/2020) for HTN.     Edis Baumann, DEBORAH - CNP

## 2020-01-23 NOTE — PATIENT INSTRUCTIONS
days before the test.  Other times, women have this kind of exam at any time of the month. This is because they have pelvic pain, bleeding, or discharge. Or they may have another pelvic problem. Before your exam, it's important to share some information with your doctor. For example, if you are a survivor of rape or sexual abuse, you can talk about any concerns you may have. Your doctor will also want to know if you are pregnant or use birth control. And he or she will want to hear about any problems, surgeries, or procedures you have had in your pelvic area. You will also need to tell your doctor when your last period was. Follow-up care is a key part of your treatment and safety. Be sure to make and go to all appointments, and call your doctor if you are having problems. It's also a good idea to know your test results and keep a list of the medicines you take. How is a pelvic exam done? · During a pelvic exam, you will:  ? Take off your clothes below the waist. You will get a paper or cloth cover to put over the lower half of your body. If this is regular checkup, you may undress completely and put on a gown. ? Lie on your back on an exam table. Your feet will be raised above you. Stirrups will support your feet. · The doctor will:  ? Ask you to relax your knees. Your knees need to lean out, toward the walls. ? Check the opening of your vagina for sores or swelling. ? Gently put a tool called a speculum into your vagina. It opens the vagina a little bit. You will feel some pressure. But if you are relaxed, it will not hurt. It lets your doctor see inside the vagina. ? Use a small brush, spatula, or swab to get a sample of cells, if you are having a Pap test or culture. The doctor then removes the speculum. ? Put on gloves and put one or two fingers of one hand into your vagina. The other hand goes on your lower belly. This lets your doctor feel your pelvic organs. You will probably feel some pressure. Try to stay relaxed. ? Put one gloved finger into your rectum and one into your vagina, if needed. This can also help check your pelvic organs. This exam takes about 10 minutes. At the end, you will get a washcloth or tissue to clean your vaginal area. You can then get dressed. Why is a pelvic exam done? A pelvic exam may be done:  · As part of a woman's regular physical checkup. The exam may include a Pap test.  · To check for vaginal infection. · To check for sexually transmitted infections, such as chlamydia or herpes. · To help find the cause of abnormal uterine bleeding. · To look for problems like uterine fibroids, ovarian cysts, or uterine prolapse. · To find the cause of pelvic or belly pain. · Before inserting an intrauterine device (IUD) for birth control. · To collect evidence if you've been sexually assaulted. What are the risks of a pelvic exam?  There is a small chance that the doctor will find something on a pelvic exam that would not have caused a problem. This is called overdiagnosis. It could lead to tests or treatment you don't need. When should you call for help? Watch closely for changes in your health, and be sure to contact your doctor if you have any problems. Where can you learn more? Go to https://Neoprospecta.72xuan. org and sign in to your InSequent account. Enter U739 in the [employer] box to learn more about \"Pelvic Exam: Care Instructions. \"     If you do not have an account, please click on the \"Sign Up Now\" link. Current as of: February 19, 2019  Content Version: 12.3  © 7673-6629 Healthwise, Incorporated. Care instructions adapted under license by [facility] ([facility]). If you have questions about a medical condition or this instruction, always ask your healthcare professional. [place] [address] any warranty or liability for your use of this information.          Patient Education        Well Visit, Women 48 to 72: Care Instructions  Your contact your doctor if you have any problems or symptoms that concern you. Where can you learn more? Go to [email or web address] and sign in to your H2scan account. Enter O954 in the [facility] box to learn more about \"Well Visit, Women 50 to 72: Care Instructions. \"     If you do not have an account, please click on the \"Sign Up Now\" link. Current as of: August 21, 2019  Content Version: 12.3  © 3536-2112 Healthwise, [employer]). Care instructions adapted under license by [facility] ([facility]). If you have questions about a medical condition or this instruction, always ask your healthcare professional. [address] any warranty or liability for your use of this information. Patient Education        DASH Diet: Care Instructions  Your Care Instructions    The DASH diet is an eating plan that can help lower your blood pressure. DASH stands for Dietary Approaches to Stop Hypertension. Hypertension is high blood pressure. The DASH diet focuses on eating foods that are high in calcium, potassium, and magnesium. These nutrients can lower blood pressure. The foods that are highest in these nutrients are fruits, vegetables, low-fat dairy products, nuts, seeds, and legumes. But taking calcium, potassium, and magnesium supplements instead of eating foods that are high in those nutrients does not have the same effect. The DASH diet also includes whole grains, fish, and poultry. The DASH diet is one of several lifestyle changes your doctor may recommend to lower your high blood pressure. Your doctor may also want you to decrease the amount of sodium in your diet. Lowering sodium while following the DASH diet can lower blood pressure even further than just the DASH diet alone. Follow-up care is a key part of your treatment and safety. Be sure to make and go to all appointments, and call your doctor if you are having problems.  It's also a good idea to know your test of the following:  ? Make it a goal to eat a fruit or vegetable at every meal and at snacks. This will make it easy to get the recommended amount of fruits and vegetables each day. ? Try yogurt topped with fruit and nuts for a snack or healthy dessert. ? Add lettuce, tomato, cucumber, and onion to sandwiches. ? Combine a ready-made pizza crust with low-fat mozzarella cheese and lots of vegetable toppings. Try using tomatoes, squash, spinach, broccoli, carrots, cauliflower, and onions. ? Have a variety of cut-up vegetables with a low-fat dip as an appetizer instead of chips and dip. ? Sprinkle sunflower seeds or chopped almonds over salads. Or try adding chopped walnuts or almonds to cooked vegetables. ? Try some vegetarian meals using beans and peas. Add garbanzo or kidney beans to salads. Make burritos and tacos with mashed gilbert beans or black beans. Where can you learn more? Go to [email or web address]://Ventas [email or web address].[email or web address] and sign in to your [employer] account. Enter R325 in the [facility] box to learn more about \"DASH Diet: Care Instructions. \"     If you do not have an account, please click on the \"Sign Up Now\" link. Current as of: April 9, 2019  Content Version: 12.3  © 6101-9221 Healthwise, [employer]. Care instructions adapted under license by [facility] [address] If you have questions about a medical condition or this instruction, always ask your healthcare professional. [place] [address] any warranty or liability for your use of this information.

## 2020-01-24 LAB
CANDIDA SPECIES, DNA PROBE: NORMAL
GARDNERELLA VAGINALIS, DNA PROBE: NORMAL
TRICHOMONAS VAGINALIS DNA: NORMAL

## 2020-01-27 LAB
HPV COMMENT: NORMAL
HPV TYPE 16: NOT DETECTED
HPV TYPE 18: NOT DETECTED
HPVOH (OTHER TYPES): NOT DETECTED

## 2020-04-21 ENCOUNTER — TELEPHONE (OUTPATIENT)
Dept: FAMILY MEDICINE CLINIC | Age: 65
End: 2020-04-21

## 2020-07-10 ENCOUNTER — TELEPHONE (OUTPATIENT)
Dept: FAMILY MEDICINE CLINIC | Age: 65
End: 2020-07-10

## 2020-07-10 NOTE — TELEPHONE ENCOUNTER
Left message for patient they are due for mammogram and let them know about the mobile mammogram unit and the information to contact them.

## 2020-08-17 ENCOUNTER — VIRTUAL VISIT (OUTPATIENT)
Dept: FAMILY MEDICINE CLINIC | Age: 65
End: 2020-08-17
Payer: COMMERCIAL

## 2020-08-17 PROCEDURE — 99213 OFFICE O/P EST LOW 20 MIN: CPT | Performed by: NURSE PRACTITIONER

## 2020-08-17 ASSESSMENT — ENCOUNTER SYMPTOMS
SHORTNESS OF BREATH: 0
CONSTIPATION: 0
SINUS PRESSURE: 0
CHEST TIGHTNESS: 0
RHINORRHEA: 0
ABDOMINAL PAIN: 0
NAUSEA: 0
SORE THROAT: 0
TROUBLE SWALLOWING: 0
PHOTOPHOBIA: 0
COUGH: 0
DIARRHEA: 0
SINUS PAIN: 0
WHEEZING: 0
BACK PAIN: 0
EYE PAIN: 0
BLOOD IN STOOL: 0
VOMITING: 0

## 2020-08-17 NOTE — PROGRESS NOTES
2020    TELEHEALTH EVALUATION -- Audio/Visual (During UJZPD-93 public health emergency)    Due to COVID-19 related state of emergency restrictions , as an alternative to an in-person session, the clinical decision was made to utilize a virtual visit to provide services for this patient's visit. These services were provided via GPal. me with the patient in their home while I was located at the office. Identity was confirmed via patient name and . Verbal consent for use of telehealth was provided to and completed by the patient. HPI:    Kareem Corona (:  1955) has requested an audio/video evaluation for the following concern(s):    Chief Complaint   Patient presents with    Hypertension     Fatigue and Weight Gain:  She states that she has history in the past of Type 2 diabetes that resolved after weight loss and lifestyle changes. Over the past 6 months she has gained weight, 30 pounds per the pateint due to not following the previous lifestyle changes. She also c/o generalized fatigue. Hypertension:  166/90 in the mornings prior to medications. 130/80's in the afternoon. No edema, watching sodium  Denies chest pain, dizziness, headaches, blurred vision, weakness, numbness, tingling or edema. No problem-specific Assessment & Plan notes found for this encounter. Review of Systems   Constitutional: Positive for fatigue. Negative for activity change, appetite change, chills, diaphoresis, fever and unexpected weight change. HENT: Negative for congestion, dental problem, ear pain, hearing loss, mouth sores, nosebleeds, postnasal drip, rhinorrhea, sinus pressure, sinus pain, sore throat, tinnitus and trouble swallowing. Eyes: Negative for photophobia, pain and visual disturbance. Respiratory: Negative for cough, chest tightness, shortness of breath and wheezing. Cardiovascular: Negative for chest pain, palpitations and leg swelling.    Gastrointestinal: Negative for H/O echocardiogram 2019    EF 55-60%, Grade II DD, Mild MR, TR & PHTN.  History of nuclear stress test 2019    normal study.  Hypertension     NELSON on CPAP     Thyroid disease      Past Surgical History:   Procedure Laterality Date    ANKLE FRACTURE SURGERY       SECTION      COLONOSCOPY  2016    int hem, repeat in 10 years    OVARY REMOVAL           PHYSICAL EXAMINATION:    Vital Signs: (As obtained by patient/caregiver or practitioner observation)    Blood pressure-  Heart rate-    Respiratory rate-    Temperature-  Pulse oximetry-     Physical Exam  Constitutional:       General: She is not in acute distress. Appearance: Normal appearance. She is not ill-appearing or toxic-appearing. HENT:      Head: Normocephalic and atraumatic. Nose: Nose normal.      Mouth/Throat:      Mouth: Mucous membranes are moist.   Eyes:      Extraocular Movements: Extraocular movements intact. Neck:      Musculoskeletal: Normal range of motion. Pulmonary:      Effort: Pulmonary effort is normal. No respiratory distress. Musculoskeletal: Normal range of motion. Skin:     Coloration: Skin is not pale. Findings: No erythema. Neurological:      Mental Status: She is alert and oriented to person, place, and time. Psychiatric:         Mood and Affect: Mood normal.         Behavior: Behavior normal.         Thought Content: Thought content normal.         Judgment: Judgment normal.         Other pertinent observable physical exam findings-     Due to this being a TeleHealth encounter, evaluation of the following organ systems is limited: Vitals/Constitutional/EENT/Resp/CV/GI//MS/Neuro/Skin/Heme-Lymph-Imm. ASSESSMENT/PLAN:    1. History of type 2 diabetes mellitus  She is concerned that her blood sugar may be elevated again due to weight gain. Discussed healthy diet and lifestyle changes to help with weight loss. Labs ordered.    - Hemoglobin A1C; Future  - COMPREHENSIVE METABOLIC PANEL; Future    2. Weight gain  Same as above. 3. Fatigue, unspecified type  Screening labs ordered. - CBC Auto Differential; Future  - VITAMIN D 25 HYDROXY; Future    4. Acquired hypothyroidism  Labs ordered for monitoring due to fatigue and weight gain.   - TSH with Reflex; Future  - T4, FREE; Future    5. Essential hypertension  Education provided on DASH diet and exercise. Continue current medications as prescribed and monitor and track blood pressure BID. Education provided on to seek medical attention if he develops sudden onset of headache, chest pain, numbness/tingling, weakness, or other concerns.     - COMPREHENSIVE METABOLIC PANEL; Future    6. Screening for malignant neoplasm of breast    - MARLYS Screening Bilateral    7. Encounter for screening for osteoporosis    - DEXA BONE DENSITY AXIAL SKELETON; Future      Return in about 3 months (around 11/17/2020) for HTN. An  electronic signature was used to authenticate this note. --DEBORAH Wells - CNP on 8/17/2020 at 9:08 AM             Pursuant to the emergency declaration under the [facility] waiver authority and the [employer] Act, this Virtual  Visit was conducted, with patient's consent, to reduce the patient's risk of exposure to COVID-19 and provide continuity of care for an established patient. Services were provided through a video synchronous discussion virtually to substitute for in-person clinic visit.         (Please note that portions of this note may have been completed with a voice recognition program. Efforts were made to edit the dictations but occasionally words aremis-transcribed.)

## 2020-08-18 DIAGNOSIS — Z86.39 HISTORY OF TYPE 2 DIABETES MELLITUS: ICD-10-CM

## 2020-08-18 DIAGNOSIS — E03.9 ACQUIRED HYPOTHYROIDISM: ICD-10-CM

## 2020-08-18 DIAGNOSIS — I10 ESSENTIAL HYPERTENSION: ICD-10-CM

## 2020-08-18 DIAGNOSIS — R53.83 FATIGUE, UNSPECIFIED TYPE: ICD-10-CM

## 2020-08-18 LAB
A/G RATIO: 1.5 (ref 1.1–2.2)
ALBUMIN SERPL-MCNC: 4.3 G/DL (ref 3.4–5)
ALP BLD-CCNC: 79 U/L (ref 40–129)
ALT SERPL-CCNC: 15 U/L (ref 10–40)
ANION GAP SERPL CALCULATED.3IONS-SCNC: 14 MMOL/L (ref 3–16)
AST SERPL-CCNC: 16 U/L (ref 15–37)
BASOPHILS ABSOLUTE: 0 K/UL (ref 0–0.2)
BASOPHILS RELATIVE PERCENT: 0.4 %
BILIRUB SERPL-MCNC: 0.6 MG/DL (ref 0–1)
BUN BLDV-MCNC: 27 MG/DL (ref 7–20)
CALCIUM SERPL-MCNC: 9.6 MG/DL (ref 8.3–10.6)
CHLORIDE BLD-SCNC: 102 MMOL/L (ref 99–110)
CO2: 22 MMOL/L (ref 21–32)
CREAT SERPL-MCNC: 1 MG/DL (ref 0.6–1.2)
EOSINOPHILS ABSOLUTE: 0.1 K/UL (ref 0–0.6)
EOSINOPHILS RELATIVE PERCENT: 2.1 %
GFR AFRICAN AMERICAN: >60
GFR NON-AFRICAN AMERICAN: 56
GLOBULIN: 2.8 G/DL
GLUCOSE BLD-MCNC: 105 MG/DL (ref 70–99)
HCT VFR BLD CALC: 41.3 % (ref 36–48)
HEMOGLOBIN: 14.1 G/DL (ref 12–16)
LYMPHOCYTES ABSOLUTE: 1.3 K/UL (ref 1–5.1)
LYMPHOCYTES RELATIVE PERCENT: 20.6 %
MCH RBC QN AUTO: 31.6 PG (ref 26–34)
MCHC RBC AUTO-ENTMCNC: 34.2 G/DL (ref 31–36)
MCV RBC AUTO: 92.4 FL (ref 80–100)
MONOCYTES ABSOLUTE: 0.3 K/UL (ref 0–1.3)
MONOCYTES RELATIVE PERCENT: 4.8 %
NEUTROPHILS ABSOLUTE: 4.6 K/UL (ref 1.7–7.7)
NEUTROPHILS RELATIVE PERCENT: 72.1 %
PDW BLD-RTO: 13.4 % (ref 12.4–15.4)
PLATELET # BLD: 177 K/UL (ref 135–450)
PMV BLD AUTO: 10.8 FL (ref 5–10.5)
POTASSIUM SERPL-SCNC: 3.9 MMOL/L (ref 3.5–5.1)
RBC # BLD: 4.47 M/UL (ref 4–5.2)
SODIUM BLD-SCNC: 138 MMOL/L (ref 136–145)
T4 FREE: 1.5 NG/DL (ref 0.9–1.8)
TOTAL PROTEIN: 7.1 G/DL (ref 6.4–8.2)
TSH REFLEX: 2.44 UIU/ML (ref 0.27–4.2)
VITAMIN D 25-HYDROXY: 70.7 NG/ML
WBC # BLD: 6.4 K/UL (ref 4–11)

## 2020-08-19 LAB
ESTIMATED AVERAGE GLUCOSE: 102.5 MG/DL
HBA1C MFR BLD: 5.2 %

## 2020-09-08 RX ORDER — METOPROLOL TARTRATE 50 MG/1
TABLET, FILM COATED ORAL
Qty: 180 TABLET | Refills: 0 | Status: SHIPPED | OUTPATIENT
Start: 2020-09-08 | End: 2020-12-11 | Stop reason: SDUPTHER

## 2020-09-21 RX ORDER — LEVOTHYROXINE SODIUM 112 UG/1
TABLET ORAL
Qty: 30 TABLET | Refills: 0 | Status: SHIPPED | OUTPATIENT
Start: 2020-09-21 | End: 2020-10-21

## 2020-10-21 RX ORDER — LEVOTHYROXINE SODIUM 112 UG/1
TABLET ORAL
Qty: 30 TABLET | Refills: 0 | Status: SHIPPED | OUTPATIENT
Start: 2020-10-21 | End: 2020-11-20

## 2020-12-11 ENCOUNTER — OFFICE VISIT (OUTPATIENT)
Dept: FAMILY MEDICINE CLINIC | Age: 65
End: 2020-12-11
Payer: COMMERCIAL

## 2020-12-11 VITALS
HEART RATE: 62 BPM | OXYGEN SATURATION: 97 % | SYSTOLIC BLOOD PRESSURE: 150 MMHG | BODY MASS INDEX: 35.58 KG/M2 | TEMPERATURE: 97 F | WEIGHT: 213.8 LBS | RESPIRATION RATE: 16 BRPM | DIASTOLIC BLOOD PRESSURE: 82 MMHG

## 2020-12-11 LAB
A/G RATIO: 1.8 (ref 1.1–2.2)
ALBUMIN SERPL-MCNC: 4.6 G/DL (ref 3.4–5)
ALP BLD-CCNC: 85 U/L (ref 40–129)
ALT SERPL-CCNC: 14 U/L (ref 10–40)
ANION GAP SERPL CALCULATED.3IONS-SCNC: 12 MMOL/L (ref 3–16)
AST SERPL-CCNC: 18 U/L (ref 15–37)
BILIRUB SERPL-MCNC: 0.6 MG/DL (ref 0–1)
BUN BLDV-MCNC: 21 MG/DL (ref 7–20)
CALCIUM SERPL-MCNC: 9.8 MG/DL (ref 8.3–10.6)
CHLORIDE BLD-SCNC: 103 MMOL/L (ref 99–110)
CO2: 27 MMOL/L (ref 21–32)
CREAT SERPL-MCNC: 0.9 MG/DL (ref 0.6–1.2)
GFR AFRICAN AMERICAN: >60
GFR NON-AFRICAN AMERICAN: >60
GLOBULIN: 2.5 G/DL
GLUCOSE BLD-MCNC: 104 MG/DL (ref 70–99)
MAGNESIUM: 2.7 MG/DL (ref 1.8–2.4)
POTASSIUM SERPL-SCNC: 4.3 MMOL/L (ref 3.5–5.1)
SODIUM BLD-SCNC: 142 MMOL/L (ref 136–145)
T4 FREE: 1.5 NG/DL (ref 0.9–1.8)
TOTAL PROTEIN: 7.1 G/DL (ref 6.4–8.2)
TSH REFLEX: 2.94 UIU/ML (ref 0.27–4.2)

## 2020-12-11 PROCEDURE — 99215 OFFICE O/P EST HI 40 MIN: CPT | Performed by: NURSE PRACTITIONER

## 2020-12-11 PROCEDURE — 93000 ELECTROCARDIOGRAM COMPLETE: CPT | Performed by: NURSE PRACTITIONER

## 2020-12-11 RX ORDER — METOPROLOL TARTRATE 50 MG/1
TABLET, FILM COATED ORAL
Qty: 180 TABLET | Refills: 0 | Status: SHIPPED | OUTPATIENT
Start: 2020-12-11 | End: 2021-03-19 | Stop reason: SDUPTHER

## 2020-12-11 RX ORDER — CITALOPRAM 10 MG/1
10 TABLET ORAL DAILY
Qty: 30 TABLET | Refills: 0 | Status: SHIPPED | OUTPATIENT
Start: 2020-12-11 | End: 2020-12-29 | Stop reason: SINTOL

## 2020-12-11 RX ORDER — INDAPAMIDE 1.25 MG/1
1.25 TABLET, FILM COATED ORAL EVERY MORNING
Qty: 30 TABLET | Refills: 2 | Status: SHIPPED | OUTPATIENT
Start: 2020-12-11 | End: 2021-01-04 | Stop reason: SDUPTHER

## 2020-12-11 ASSESSMENT — PATIENT HEALTH QUESTIONNAIRE - PHQ9
SUM OF ALL RESPONSES TO PHQ QUESTIONS 1-9: 1
2. FEELING DOWN, DEPRESSED OR HOPELESS: 0
SUM OF ALL RESPONSES TO PHQ QUESTIONS 1-9: 1
1. LITTLE INTEREST OR PLEASURE IN DOING THINGS: 1
SUM OF ALL RESPONSES TO PHQ9 QUESTIONS 1 & 2: 1
2. FEELING DOWN, DEPRESSED OR HOPELESS: 1

## 2020-12-11 NOTE — PROGRESS NOTES
12/13/20    Chief Complaint   Patient presents with    Medication Refill     patient is here for medication refills        Nallely Baer, (1955), is a 72 y.o. female, is here for evaluation of the following medical concerns:    HPI  She is her for follow-up:    Depression/Anxiey:  She is tearful upon my arrival to her room and states that she is struggling with a 6 week history of depression and anxiety due to her stepson and his family moving into her home. States that she feels overwhelmed and she feels the pandemic is only making things worse. She denies being on medications for depression in the past. Denies SI or HI    Fatigue and Weight Gain:  She states that she has history in the past of Type 2 diabetes that resolved after weight loss and lifestyle changes. Over the past 6 months she has gained weight, 30 pounds per the pateint due to not following the previous lifestyle changes. She also c/o generalized fatigue.      Hypertension:  Home monitoring 150/80's average at home, been running higher when she is stressed. Has had palpitations, heart rate on smart phone heart rate is fluc 135-40's per her phone radha. No edema, watching sodium    NELSON on CPAP  Using compliantly, will be following up with pulmonology soon. Dr. Geeta Gilmore     Acquired hypothyroidism  Taking medications compliantly in the morning on an empty stomach. Peripheral edema  No edema at this time. Has not noticed issues with edema since last year. Osteoarthritis:  Doing ok, some finger stiffness at times. Screening for malignant neoplasm of breast  Mammogram ordered 8/17/2020, not completed    Encounter for screening for osteoporosis  DEXA ordered 8/17/2020, not completed    Influenza Vaccine:  Flu Vaccine at work    Review of Systems   Constitutional: Positive for fatigue and unexpected weight change. Negative for activity change, appetite change, chills, diaphoresis and fever. HENT: Negative for congestion, dental problem, ear pain, hearing loss, mouth sores, nosebleeds, postnasal drip, rhinorrhea, sinus pressure, sinus pain, sore throat, tinnitus and trouble swallowing. Eyes: Negative for photophobia, pain and visual disturbance. Respiratory: Negative for cough, chest tightness, shortness of breath and wheezing. Cardiovascular: Positive for palpitations. Negative for chest pain and leg swelling. Gastrointestinal: Negative for abdominal pain, blood in stool, constipation, diarrhea, nausea and vomiting. Endocrine: Negative for cold intolerance, heat intolerance, polydipsia and polyuria. Genitourinary: Negative for difficulty urinating, dysuria, flank pain, frequency, hematuria and urgency. Musculoskeletal: Negative for arthralgias, back pain, gait problem, joint swelling, myalgias, neck pain and neck stiffness. Skin: Negative for pallor, rash and wound. Allergic/Immunologic: Negative for environmental allergies, food allergies and immunocompromised state. Neurological: Negative for dizziness, syncope, weakness, light-headedness, numbness and headaches. Hematological: Negative for adenopathy. Does not bruise/bleed easily. Psychiatric/Behavioral: Positive for dysphoric mood. Negative for confusion, decreased concentration, self-injury, sleep disturbance and suicidal ideas. The patient is nervous/anxious. Prior to Visit Medications    Medication Sig Taking?  Authorizing Provider   EUTHYROX 112 MCG tablet Take 1 tablet by mouth once daily  DEBORAH Quick CNP   metoprolol tartrate (LOPRESSOR) 50 MG tablet Take 1 tablet by mouth twice daily  DEBORAH Quick CNP   verapamil (CALAN SR) 120 MG extended release tablet Take 1 tablet by mouth once daily  DEBORAH Quick CNP   UNABLE TO FIND Home blood pressure monitor and cuff  DEBORAH Quick CNP   furosemide (LASIX) 20 MG tablet Take 1 tablet by mouth daily Patient not taking: Reported on 2020  DEBORAH Barbosa - CNP      Past Medical History:   Diagnosis Date    Diabetes mellitus ([address]     H/O 24 hour EKG monitoring 2019    Basic sinus rhythm Minimum HR 41 bpm APC noted brief SVT noted Pt reported symptoms of lightheadedness no arrhythmias noted.  H/O echocardiogram 2019    EF 55-60%, Grade II DD, Mild MR, TR & PHTN.  History of nuclear stress test 2019    normal study.  Hypertension     NELSON on CPAP     Thyroid disease       Past Surgical History:   Procedure Laterality Date    ANKLE FRACTURE SURGERY       SECTION      COLONOSCOPY  2016    int hem, repeat in 10 years    OVARY REMOVAL        Social History     Tobacco Use    Smoking status: Never Smoker    Smokeless tobacco: Never Used   Substance Use Topics    Alcohol use: Yes    Drug use: No        Lab Results   Component Value Date    WBC 6.4 2020    HGB 14.1 2020    HCT 41.3 2020    MCV 92.4 2020     2020     Lab Results   Component Value Date    LABA1C 5.2 2020     Lab Results   Component Value Date    TSH 2.58 2016     Lab Results   Component Value Date    CHOL 219 (H) 2019    TRIG 173 (H) 2019    HDL 61 (H) 2019    LDLCALC 123 (H) 2019    LABVLDL 35 2019       Wt Readings from Last 3 Encounters:   20 213 lb 12.8 oz (97 kg)   20 213 lb (96.6 kg)   19 211 lb 2 oz (95.8 kg)     . FLOWAMB[6   BP Readings from Last 3 Encounters:   20 (!) 150/82   20 138/80   19 138/80     Pulse Readings from Last 3 Encounters:   20 62   20 50   19 58        Results for orders placed or performed in visit on 20   COMPREHENSIVE METABOLIC PANEL   Result Value Ref Range    Sodium 142 136 - 145 mmol/L    Potassium 4.3 3.5 - 5.1 mmol/L    Chloride 103 99 - 110 mmol/L    CO2 27 21 - 32 mmol/L    Anion Gap 12 3 - 16 Glucose 104 (H) 70 - 99 mg/dL    BUN 21 (H) 7 - 20 mg/dL    CREATININE 0.9 0.6 - 1.2 mg/dL    GFR Non-African American >60 >60    GFR African American >60 >60    Calcium 9.8 8.3 - 10.6 mg/dL    Total Protein 7.1 6.4 - 8.2 g/dL    Alb 4.6 3.4 - 5.0 g/dL    Albumin/Globulin Ratio 1.8 1.1 - 2.2    Total Bilirubin 0.6 0.0 - 1.0 mg/dL    Alkaline Phosphatase 85 40 - 129 U/L    ALT 14 10 - 40 U/L    AST 18 15 - 37 U/L    Globulin 2.5 g/dL   MAGNESIUM   Result Value Ref Range    Magnesium 2.70 (H) 1.80 - 2.40 mg/dL   TSH with Reflex   Result Value Ref Range    TSH 2.94 0.27 - 4.20 uIU/mL   T4, FREE   Result Value Ref Range    T4 Free 1.5 0.9 - 1.8 ng/dL       Physical Exam  Vitals signs and nursing note reviewed. Constitutional:       General: She is not in acute distress. Appearance: Normal appearance. She is not ill-appearing, toxic-appearing or diaphoretic. HENT:      Head: Normocephalic and atraumatic. Right Ear: Tympanic membrane, ear canal and external ear normal.      Left Ear: Tympanic membrane, ear canal and external ear normal.      Nose: Nose normal. No congestion or rhinorrhea. Mouth/Throat:      Mouth: Mucous membranes are moist.      Pharynx: Oropharynx is clear. No oropharyngeal exudate or posterior oropharyngeal erythema. Eyes:      Extraocular Movements: Extraocular movements intact. Conjunctiva/sclera: Conjunctivae normal.      Pupils: Pupils are equal, round, and reactive to light. Neck:      Musculoskeletal: Normal range of motion and neck supple. No neck rigidity or muscular tenderness. Cardiovascular:      Rate and Rhythm: Normal rate and regular rhythm. Pulses: Normal pulses. Heart sounds: Normal heart sounds. Pulmonary:      Effort: Pulmonary effort is normal. No respiratory distress. Breath sounds: Normal breath sounds. No stridor. No wheezing, rhonchi or rales. Chest:      Chest wall: No tenderness.    Abdominal: Discussed following up with Dr. Tova Arreola again due to return of palpitations. She states that her heart rate will vary on her smart watch and will fluctuate between 50 and 140 bpm intermittently. Denies chest pain, sob, dizziness, weakness. EKG sinus hannah in office today. - metoprolol tartrate (LOPRESSOR) 50 MG tablet; Take 1 tablet by mouth twice daily  Dispense: 180 tablet; Refill: 0  - EKG 12 Lead  - Ravi Lieberman MD, Cardiology, [facility]  - COMPREHENSIVE METABOLIC PANEL  - MAGNESIUM  - TSH with Reflex  - T4, FREE    2. Essential hypertension  Hypertensive in the today. Will add indapamide 1.25 mg once daily and advised to continue to home monitor. Follow up in 4 weeks and advised to bring home monitor and home readings for review. Education provided on DASH diet and exercise. Continue current medications as prescribed and monitor and track blood pressure BID. Education provided on to seek medical attention if he develops sudden onset of headache, chest pain, numbness/tingling, weakness, or other concerns. - metoprolol tartrate (LOPRESSOR) 50 MG tablet; Take 1 tablet by mouth twice daily  Dispense: 180 tablet; Refill: 0  -indapamide 1.25 mg once daily  - Ravi Lieberman MD, Cardiology, [facility]  - COMPREHENSIVE METABOLIC PANEL    3. Post-menopausal    - DEXA Bone Density Axial Skeleton    4. Encounter for screening mammogram for breast cancer    - [facility] Heart Digital Screen Bilateral [[id number]]    5. Need for prophylactic vaccination against Streptococcus pneumoniae (pneumococcus)  Declines today. Education provided. 6. Need for prophylactic vaccination and inoculation against varicella  Declines today. Education provided.      7. Situational depression Discussed benefits vs risks of treatment with antidepressants and signs and symptoms of worsening condition. [place] decision making utilized and we have decided to begin antidepressant medication treatment. Denies SI or HI. Advised to increase exercise, eat a healthy diet, and educated on sleep hygiene. - Citalopram 10 mg once daily    8. Anxiety  Same as above  - Citalopram 10 mg once daily    9. Acquired hypothyroidism  Monitoring ordered due to current symptoms.  - TSH with Reflex  - T4, FREE    10. NELSON on CPAP  Compliant with wearing the CPAP, needs follow-up with pulmonology. - [facility]) - Chris Saxena MD, Pulmonlogy, [facility]         Return in about 4 weeks (around 1/8/2021) for HTN, Depression.     DEBORAH Hernandez - CNP

## 2020-12-13 ASSESSMENT — ENCOUNTER SYMPTOMS
WHEEZING: 0
ABDOMINAL PAIN: 0
CHEST TIGHTNESS: 0
PHOTOPHOBIA: 0
SINUS PRESSURE: 0
SORE THROAT: 0
BLOOD IN STOOL: 0
DIARRHEA: 0
SHORTNESS OF BREATH: 0
RHINORRHEA: 0
SINUS PAIN: 0
BACK PAIN: 0
COUGH: 0
EYE PAIN: 0
TROUBLE SWALLOWING: 0
NAUSEA: 0
CONSTIPATION: 0
VOMITING: 0

## 2020-12-29 ENCOUNTER — OFFICE VISIT (OUTPATIENT)
Dept: FAMILY MEDICINE CLINIC | Age: 65
End: 2020-12-29
Payer: COMMERCIAL

## 2020-12-29 VITALS
BODY MASS INDEX: 36.09 KG/M2 | SYSTOLIC BLOOD PRESSURE: 152 MMHG | DIASTOLIC BLOOD PRESSURE: 82 MMHG | OXYGEN SATURATION: 97 % | WEIGHT: 216.6 LBS | HEIGHT: 65 IN | RESPIRATION RATE: 16 BRPM | TEMPERATURE: 98.1 F | HEART RATE: 58 BPM

## 2020-12-29 DIAGNOSIS — I10 ESSENTIAL HYPERTENSION: ICD-10-CM

## 2020-12-29 DIAGNOSIS — R51.9 NEW ONSET OF HEADACHES AFTER AGE 50: ICD-10-CM

## 2020-12-29 DIAGNOSIS — R89.9 ABNORMAL LABORATORY TEST: ICD-10-CM

## 2020-12-29 DIAGNOSIS — R00.1 BRADYCARDIA: Primary | ICD-10-CM

## 2020-12-29 DIAGNOSIS — R00.1 BRADYCARDIA: ICD-10-CM

## 2020-12-29 LAB
ANION GAP SERPL CALCULATED.3IONS-SCNC: 15 MMOL/L (ref 3–16)
BUN BLDV-MCNC: 20 MG/DL (ref 7–20)
CALCIUM SERPL-MCNC: 9.4 MG/DL (ref 8.3–10.6)
CHLORIDE BLD-SCNC: 102 MMOL/L (ref 99–110)
CO2: 24 MMOL/L (ref 21–32)
CREAT SERPL-MCNC: 1 MG/DL (ref 0.6–1.2)
GFR AFRICAN AMERICAN: >60
GFR NON-AFRICAN AMERICAN: 56
GLUCOSE BLD-MCNC: 102 MG/DL (ref 70–99)
MAGNESIUM: 2.2 MG/DL (ref 1.8–2.4)
POTASSIUM SERPL-SCNC: 3.9 MMOL/L (ref 3.5–5.1)
SODIUM BLD-SCNC: 141 MMOL/L (ref 136–145)

## 2020-12-29 PROCEDURE — 99213 OFFICE O/P EST LOW 20 MIN: CPT | Performed by: NURSE PRACTITIONER

## 2020-12-29 PROCEDURE — 93000 ELECTROCARDIOGRAM COMPLETE: CPT | Performed by: NURSE PRACTITIONER

## 2020-12-29 ASSESSMENT — PATIENT HEALTH QUESTIONNAIRE - PHQ9
2. FEELING DOWN, DEPRESSED OR HOPELESS: 0
SUM OF ALL RESPONSES TO PHQ QUESTIONS 1-9: 0
SUM OF ALL RESPONSES TO PHQ9 QUESTIONS 1 & 2: 0

## 2020-12-29 NOTE — PROGRESS NOTES
Josefina Tony   1955    Chief Complaint   Patient presents with    Hypertension     Patient came to office for lab work and states her BP has been running 190/100 for 2-3 weeks     Other     blurred vision while driving, states he has HA that radiates up back of neck         Subjective:  Ramya Carballo is a 72 y.o. female who is here today complaining of hypertension. Hypertension:  She is here today for ordered labs and reported to the MA that her blood pressure has been running high at home. I had her added to my schedule to evaluate. She did take the metoprolol and indapamide this am around 0830. She stopped taking the verapamil last week after our appointment due to a misunderstanding. States her blood pressures have been running 150's/80's. Previous appointment labs showed high magnesium, advised her to hold daily magnesium supplement and ordered repeat labs which is what she was here for today. States that her chest pain and palpitations have improved since stopping magnesium supplement. Chronic Headaches:  Posterior headaches in back of head that has been worsening for 1 month, Blurred vision noticed only when driving and looking at street signs. Has not had a recent eye exam.  She was not able to complete the prior MRI that was ordered last November due to the cost when she had new onset of headaches then. Denies dizziness, balance issues, n/v. Aleeve does help with headaches. Denies current headache today, happens almost daily though. Was unclear if she should be taking verapamil or not and she stopped taking  All hypertensive readings     States that she did not tolerate Celexa so she stopped    Review of Systems   Constitutional: Negative for activity change, appetite change, chills, diaphoresis, fatigue, fever and unexpected weight change. HENT: Negative for congestion, dental problem, ear pain, hearing loss, mouth sores, nosebleeds, postnasal drip, rhinorrhea, sinus pressure, sinus pain, sore throat, tinnitus and trouble swallowing. Eyes: Negative for photophobia, pain and visual disturbance. Respiratory: Negative for cough, chest tightness, shortness of breath and wheezing. Cardiovascular: Negative for chest pain, palpitations and leg swelling. Gastrointestinal: Negative for abdominal pain, blood in stool, constipation, diarrhea, nausea and vomiting. Endocrine: Negative for cold intolerance, heat intolerance, polydipsia and polyuria. Genitourinary: Negative for difficulty urinating, dysuria, flank pain, frequency, hematuria and urgency. Musculoskeletal: Negative for arthralgias, back pain, gait problem, joint swelling, myalgias, neck pain and neck stiffness. Skin: Negative for pallor, rash and wound. Allergic/Immunologic: Negative for environmental allergies, food allergies and immunocompromised state. Neurological: Positive for headaches. Negative for dizziness, syncope, weakness, light-headedness and numbness. Hematological: Negative for adenopathy. Does not bruise/bleed easily. Psychiatric/Behavioral: Positive for dysphoric mood. Negative for confusion, decreased concentration, self-injury, sleep disturbance and suicidal ideas. The patient is not nervous/anxious. Current Outpatient Medications   Medication Sig Dispense Refill    indapamide (LOZOL) 1.25 MG tablet Take 2 tablets by mouth every morning 30 tablet 2    metoprolol tartrate (LOPRESSOR) 50 MG tablet Take 1 tablet by mouth twice daily 180 tablet 0    EUTHYROX 112 MCG tablet Take 1 tablet by mouth once daily 30 tablet 2    UNABLE TO FIND Home blood pressure monitor and cuff 1 Units 0     No current facility-administered medications for this visit.          No Known Allergies  Past Medical History:   Diagnosis Date    Diabetes mellitus [address] [address]  H/O 24 hour EKG monitoring 2019    Basic sinus rhythm Minimum HR 41 bpm APC noted brief SVT noted Pt reported symptoms of lightheadedness no arrhythmias noted.  H/O echocardiogram 2019    EF 55-60%, Grade II DD, Mild MR, TR & PHTN.  History of nuclear stress test 2019    normal study.  Hypertension     NELSON on CPAP     Thyroid disease      Past Surgical History:   Procedure Laterality Date    ANKLE FRACTURE SURGERY       SECTION      COLONOSCOPY  2016    int hem, repeat in 10 years    OVARY REMOVAL       Family History   Problem Relation Age of Onset    Diabetes Mother     Hypertension Mother     Diabetes Father     Hypertension Father     Heart Disease Father     Heart Surgery Father      Social History     Socioeconomic History    Marital status:      Spouse name: Not on file    Number of children: Not on file    Years of education: Not on file    Highest education level: Not on file   Occupational History    Not on file   Social Needs    Financial resource strain: Not on file    Food insecurity     Worry: Not on file     Inability: Not on file    Transportation needs     Medical: Not on file     Non-medical: Not on file   Tobacco Use    Smoking status: Never Smoker    Smokeless tobacco: Never Used   Substance and Sexual Activity    Alcohol use:  Yes    Drug use: No    Sexual activity: Yes     Partners: Male   Lifestyle    Physical activity     Days per week: Not on file     Minutes per session: Not on file    Stress: Not on file   Relationships    Social connections     Talks on phone: Not on file     Gets together: Not on file     Attends Jewish service: Not on file     Active member of club or organization: Not on file     Attends meetings of clubs or organizations: Not on file     Relationship status: Not on file    Intimate partner violence     Fear of current or ex partner: Not on file     Emotionally abused: Not on file Physically abused: Not on file     Forced sexual activity: Not on file   Other Topics Concern    Not on file   Social History Narrative    Not on file         Objective: Wt Readings from Last 3 Encounters:   12/29/20 216 lb 9.6 oz (98.2 kg)   12/11/20 213 lb 12.8 oz (97 kg)   01/23/20 213 lb (96.6 kg)       . FLOWAMB[6    BP Readings from Last 3 Encounters:   12/29/20 (!) 152/82   12/11/20 (!) 150/82   01/23/20 138/80      Pulse Readings from Last 3 Encounters:   12/29/20 58   12/11/20 62   01/23/20 50      Physical Exam  Vitals signs and nursing note reviewed. Constitutional:       General: She is not in acute distress. Appearance: Normal appearance. She is not ill-appearing, toxic-appearing or diaphoretic. HENT:      Head: Normocephalic and atraumatic. Right Ear: Tympanic membrane, ear canal and external ear normal.      Left Ear: Tympanic membrane, ear canal and external ear normal.      Nose: Nose normal. No congestion or rhinorrhea. Mouth/Throat:      Mouth: Mucous membranes are moist.      Pharynx: Oropharynx is clear. No oropharyngeal exudate or posterior oropharyngeal erythema. Eyes:      Extraocular Movements: Extraocular movements intact. Conjunctiva/sclera: Conjunctivae normal.      Pupils: Pupils are equal, round, and reactive to light. Neck:      Musculoskeletal: Normal range of motion and neck supple. No neck rigidity or muscular tenderness. Cardiovascular:      Rate and Rhythm: Normal rate and regular rhythm. Pulses: Normal pulses. Heart sounds: Normal heart sounds. Pulmonary:      Effort: Pulmonary effort is normal. No respiratory distress. Breath sounds: Normal breath sounds. No stridor. No wheezing, rhonchi or rales. Chest:      Chest wall: No tenderness. Abdominal:      General: Abdomen is flat. Bowel sounds are normal. There is no distension. Palpations: Abdomen is soft. There is no mass. Tenderness: There is no abdominal tenderness. There is no right CVA tenderness, left CVA tenderness or guarding. Hernia: No hernia is present. Musculoskeletal: Normal range of motion. General: No swelling or tenderness. Right lower leg: No edema. Left lower leg: No edema. Lymphadenopathy:      Cervical: No cervical adenopathy. Skin:     General: Skin is warm and dry. Capillary Refill: Capillary refill takes less than 2 seconds. Coloration: Skin is not pale. Findings: No bruising, erythema, lesion or rash. Neurological:      General: No focal deficit present. Mental Status: She is alert and oriented to person, place, and time. Motor: No weakness. Coordination: Coordination normal.      Gait: Gait normal.   Psychiatric:         Attention and Perception: Attention and perception normal.         Mood and Affect: Mood and affect normal.         Speech: Speech normal.         Behavior: Behavior normal.         Thought Content: Thought content normal. Thought content is not paranoid or delusional. Thought content does not include homicidal or suicidal ideation. Thought content does not include homicidal or suicidal plan. Judgment: Judgment normal.       PHQ Scores 12/29/2020 12/11/2020 12/11/2020 1/23/2020 12/6/2019 11/11/2019 4/2/2019   PHQ2 Score 0 - 1 0 0 0 0   PHQ9 Score 0 1 1 0 0 0 0     Interpretation of Total Score Depression Severity: 1-4 = Minimal depression, 5-9 = Mild depression, 10-14 = Moderate depression, 15-19 = Moderately severe depression, 20-27 = Severe depression    No results found for this visit on 12/29/20. Assessment and Plan:      1.  Bradycardia She is bradycardic in the office today. Discussed with my collaborating physician and I will continue to have her hold the verapamil. Repeat Holter monitor ordered due to patient reports of fluctuating heart rate on her smart watch. She is already scheduled with cardiology 1/6/2021 for further evaluation as well. - EKG 12 lead  - Basic Metabolic Panel; Future    2. Essential hypertension  Advised the patient to increase the Indapamide to 2.5 mg once daily and monitor her blood pressure at home. Report continued hypertensive readings. - Basic Metabolic Panel; Future    3. New onset of headaches after age 48  Posterior headaches in back of head that has been worsening for 1 month, Blurred vision noticed only when driving and looking at street signs. Has not had a recent eye exam.  She was not able to complete the prior MRI that was ordered last November due to the cost when she had new onset of headaches then. Denies dizziness, balance issues, n/v. Aleeve does help with headaches. Denies current headache today, happens almost daily though. She is willing to complete MRI now, test re-ordered. - MRI BRAIN W WO CONTRAST; Future      Care discussed with patient. Questions answered. Patient verbalizes understanding and agrees with plan. After visit summary provided. Advised to call for any problems, questions, or concerns. Return in about 1 week (around 1/5/2021).            DEBORAH Weston - CNP  01/04/21  6:18 PM

## 2021-01-04 RX ORDER — INDAPAMIDE 1.25 MG/1
2.5 TABLET, FILM COATED ORAL EVERY MORNING
Qty: 30 TABLET | Refills: 2 | Status: SHIPPED | OUTPATIENT
Start: 2021-01-04 | End: 2021-01-07 | Stop reason: SDUPTHER

## 2021-01-04 ASSESSMENT — ENCOUNTER SYMPTOMS
BLOOD IN STOOL: 0
EYE PAIN: 0
PHOTOPHOBIA: 0
CHEST TIGHTNESS: 0
VOMITING: 0
DIARRHEA: 0
ABDOMINAL PAIN: 0
SINUS PAIN: 0
BACK PAIN: 0
SINUS PRESSURE: 0
TROUBLE SWALLOWING: 0
RHINORRHEA: 0
NAUSEA: 0
SHORTNESS OF BREATH: 0
WHEEZING: 0
COUGH: 0
CONSTIPATION: 0
SORE THROAT: 0

## 2021-01-06 ENCOUNTER — HOSPITAL ENCOUNTER (OUTPATIENT)
Dept: MRI IMAGING | Age: 66
Discharge: HOME | End: 2021-01-06
Payer: COMMERCIAL

## 2021-01-06 ENCOUNTER — TELEPHONE (OUTPATIENT)
Dept: CARDIOLOGY CLINIC | Age: 66
End: 2021-01-06

## 2021-01-06 ENCOUNTER — OFFICE VISIT (OUTPATIENT)
Dept: CARDIOLOGY CLINIC | Age: 66
End: 2021-01-06
Payer: COMMERCIAL

## 2021-01-06 VITALS
HEART RATE: 64 BPM | DIASTOLIC BLOOD PRESSURE: 92 MMHG | BODY MASS INDEX: 36.15 KG/M2 | TEMPERATURE: 97.2 F | WEIGHT: 217 LBS | SYSTOLIC BLOOD PRESSURE: 144 MMHG | HEIGHT: 65 IN

## 2021-01-06 DIAGNOSIS — R00.2 PALPITATION: Primary | ICD-10-CM

## 2021-01-06 DIAGNOSIS — R51.9 NEW ONSET OF HEADACHES AFTER AGE 50: Primary | ICD-10-CM

## 2021-01-06 DIAGNOSIS — R90.82 WHITE MATTER ABNORMALITY ON MRI OF BRAIN: ICD-10-CM

## 2021-01-06 DIAGNOSIS — R51.9 NEW ONSET OF HEADACHES AFTER AGE 50: ICD-10-CM

## 2021-01-06 PROCEDURE — 6360000004 HC RX CONTRAST MEDICATION: Performed by: NURSE PRACTITIONER

## 2021-01-06 PROCEDURE — A9579 GAD-BASE MR CONTRAST NOS,1ML: HCPCS | Performed by: NURSE PRACTITIONER

## 2021-01-06 PROCEDURE — 70553 MRI BRAIN STEM W/O & W/DYE: CPT

## 2021-01-06 PROCEDURE — 99214 OFFICE O/P EST MOD 30 MIN: CPT | Performed by: INTERNAL MEDICINE

## 2021-01-06 RX ORDER — LOSARTAN POTASSIUM 50 MG/1
50 TABLET ORAL DAILY
Qty: 90 TABLET | Refills: 1 | Status: SHIPPED | OUTPATIENT
Start: 2021-01-06 | End: 2021-02-24

## 2021-01-06 RX ADMIN — GADOTERIDOL 15 ML: 279.3 INJECTION, SOLUTION INTRAVENOUS at 09:43

## 2021-01-06 NOTE — PROGRESS NOTES
Aruna Vicente MD        OFFICE  FOLLOWUP NOTE    Chief complaints: patient is here for management of HTN, shortness of breath, hypothyroidsim, SVT    Subjective: + palpitations    HPI Delmis Anderson is a 72 y. o.year old who  has a past medical history of Diabetes mellitus ([facility] [address] H/O 24 hour EKG monitoring, H/O echocardiogram, History of nuclear stress test, Hypertension, NELSON on CPAP, and Thyroid disease. and presents for management of HTN, shortness of breath, hypothyroidsim, SVT which are well controlled    She had lot of blood pressure medication changes,  She was not feeling well, had palpitations, and was taking verapamil 120mg since , she is not on lisinopril or lasix, and is taking indapamide, she feels tired and was almost ready to go to ED for palptiations  Current Outpatient Medications   Medication Sig Dispense Refill    indapamide (LOZOL) 1.25 MG tablet Take 2 tablets by mouth every morning 30 tablet 2    metoprolol tartrate (LOPRESSOR) 50 MG tablet Take 1 tablet by mouth twice daily 180 tablet 0    EUTHYROX 112 MCG tablet Take 1 tablet by mouth once daily 30 tablet 2    UNABLE TO FIND Home blood pressure monitor and cuff 1 Units 0     No current facility-administered medications for this visit. Allergies: Patient has no known allergies. Past Medical History:   Diagnosis Date    [facility]     H/O 24 hour EKG monitoring 2019    Basic sinus rhythm Minimum HR 41 bpm APC noted brief SVT noted Pt reported symptoms of lightheadedness no arrhythmias noted.  H/O echocardiogram 2019    EF 55-60%, Grade II DD, Mild MR, TR & PHTN.  History of nuclear stress test 2019    normal study.     Hypertension     NELSON on CPAP     Thyroid disease      Past Surgical History:   Procedure Laterality Date    ANKLE FRACTURE SURGERY       SECTION      COLONOSCOPY  2016    int hem, repeat in 10 years    OVARY REMOVAL       Family History Problem Relation Age of Onset    Diabetes Mother     Hypertension Mother     Diabetes Father     Hypertension Father     Heart Disease Father     Heart Surgery Father      Social History     Tobacco Use    Smoking status: Never Smoker    Smokeless tobacco: Never Used   Substance Use Topics    Alcohol use: Yes      [unfilled]  Review of Systems:   · Constitutional: No Fever or Weight Loss   · Eyes: No Decreased Vision  · ENT: No Headaches, Hearing Loss or Vertigo  · Cardiovascular: No chest pain, dyspnea on exertion,+ palpitations or loss of consciousness  · Respiratory: No cough or wheezing    · Gastrointestinal: No abdominal pain, appetite loss, blood in stools, constipation, diarrhea or heartburn  · Genitourinary: No dysuria, trouble voiding, or hematuria  · Musculoskeletal:  No gait disturbance, weakness or joint complaints  · Integumentary: No rash or pruritis  · Neurological: No TIA or stroke symptoms  · Psychiatric: No anxiety or depression  · Endocrine: No malaise, fatigue or temperature intolerance  · Hematologic/Lymphatic: No bleeding problems, blood clots or swollen lymph nodes  · Allergic/Immunologic: No nasal congestion or hives  All systems negative except as marked. Objective:  BP (!) 144/92   Pulse 64   Temp 97.2 °F (36.2 °C)   Ht 5' 5\" (1.651 m)   Wt 217 lb (98.4 kg)   LMP  (LMP Unknown)   BMI 36.11 kg/m²   Wt Readings from Last 3 Encounters:   01/06/21 217 lb (98.4 kg)   12/29/20 216 lb 9.6 oz (98.2 kg)   12/11/20 213 lb 12.8 oz (97 kg)     Body mass index is 36.11 kg/m². GENERAL - Alert, oriented, pleasant, in no apparent distress,normal grooming  HEENT  pupils are intact, cornea intact, conjunctive normal color, ears are normal in exam,  Neck - Supple. No jugular venous distention noted.  No carotid bruits, no apical -carotid delay Respiratory:  Normal breath sounds, No respiratory distress, No wheezing, No chest tenderness. ,no use of accessory muscles, diaphragm movement is normal  Cardiovascular: (PMI) apex non displaced,no lifts no thrills, no s3,no s4, Normal heart rate, Normal rhythm, No murmurs, No rubs, No gallops. Carotid arteries pulse and amplitude are normal no bruit, no abdominal bruit noted ( normal abdominal aorta ausculation),   Extremities - No cyanosis, clubbing, or significant edema, no varicose veins    Abdomen  No masses, tenderness, or organomegaly, no hepato-splenomegally, no bruits  Musculoskeletal  No significant edema, no kyphosis or scoliosis, no deformity in any extremity noted, muscle strength and tone are normal  Skin: no ulcer,no scar,no stasis dermatitis   Neurologic  alert oriented times 3,Cranial nerves II through XII are grossly intact. There were no gross focal neurologic abnormalities. Psychiatric: normal mood and affect    Lab Results   Component Value Date    TROPONINI <0.006 05/12/2013     BNP:    Lab Results   Component Value Date    BNP 60.2 05/11/2013     PT/INR:  No results found for: PTINR  Lab Results   Component Value Date    LABA1C 5.2 08/18/2020    LABA1C 5.0 06/05/2018     Lab Results   Component Value Date    CHOL 219 (H) 11/04/2019    TRIG 173 (H) 11/04/2019    HDL 61 (H) 11/04/2019    LDLCALC 123 (H) 11/04/2019     Lab Results   Component Value Date    ALT 14 12/11/2020    AST 18 12/11/2020     TSH:    Lab Results   Component Value Date    TSH 2.58 09/14/2016       Impression:  Jose Alfredo Ch is a 72 y. o.year old who  has a past medical history of Diabetes mellitus ([facility] [address] H/O 24 hour EKG monitoring, H/O echocardiogram, History of nuclear stress test, Hypertension, NELSON on CPAP, and Thyroid disease. and presents with     Plan:  1.  Tiredness: could be from uncontrolled HTN, she had cough with lisinopril, will add losartan 50mg daily 2. Palpitations, she had discontinue verapamil 120 mg, will add cardizem 30mg  Prn, her last holter monitor check, it had SVT and was recommended to continue verapamil.will get holter monitor if needed on next visit  3. Dyspnea on exertion:resolved, normal stress test and echo, both reviewed and discussed  4. Chest pressure:normal stress test and echo, will not pursue it  5. Murmur: echo Iis nromal  6. OBESITY: RECOMMEND TO LOSE WEIGHT  7. Dyslipidemia: check lipids  8. HTN: unstable, continue lopressor,start losartan  9. All labs, medications and tests reviewed, continue all other medications of all above medical condition listed as is.

## 2021-01-06 NOTE — RESULT ENCOUNTER NOTE
Please call the patient and let her know that after reviewing her MRI results that I would like to refer her to neurology. No mass is seen, but there is some changes seen within the white matter of the brain that need further evaluation to see if there may be a blood flow issue, or other causes. Referral placed for Dr. Queta Payne. Please see how her blood pressures have been running as well. Thank you.

## 2021-01-07 ENCOUNTER — OFFICE VISIT (OUTPATIENT)
Dept: FAMILY MEDICINE CLINIC | Age: 66
End: 2021-01-07
Payer: COMMERCIAL

## 2021-01-07 VITALS
WEIGHT: 216.2 LBS | SYSTOLIC BLOOD PRESSURE: 144 MMHG | TEMPERATURE: 97.2 F | HEART RATE: 66 BPM | OXYGEN SATURATION: 98 % | DIASTOLIC BLOOD PRESSURE: 82 MMHG | BODY MASS INDEX: 35.98 KG/M2 | RESPIRATION RATE: 16 BRPM

## 2021-01-07 DIAGNOSIS — R51.9 NEW ONSET OF HEADACHES AFTER AGE 50: Primary | ICD-10-CM

## 2021-01-07 DIAGNOSIS — I10 ESSENTIAL HYPERTENSION: ICD-10-CM

## 2021-01-07 PROCEDURE — 99213 OFFICE O/P EST LOW 20 MIN: CPT | Performed by: NURSE PRACTITIONER

## 2021-01-07 RX ORDER — INDAPAMIDE 1.25 MG/1
2.5 TABLET, FILM COATED ORAL EVERY MORNING
Qty: 60 TABLET | Refills: 2 | Status: SHIPPED | OUTPATIENT
Start: 2021-01-07 | End: 2021-01-21 | Stop reason: SDUPTHER

## 2021-01-07 ASSESSMENT — ENCOUNTER SYMPTOMS
SORE THROAT: 0
COUGH: 0
PHOTOPHOBIA: 0
VOMITING: 0
ABDOMINAL PAIN: 0
BLOOD IN STOOL: 0
SINUS PAIN: 0
DIARRHEA: 0
SHORTNESS OF BREATH: 0
WHEEZING: 0
TROUBLE SWALLOWING: 0
RHINORRHEA: 0
CONSTIPATION: 0
EYE PAIN: 0
SINUS PRESSURE: 0
BACK PAIN: 0
CHEST TIGHTNESS: 0
NAUSEA: 0

## 2021-01-07 ASSESSMENT — PATIENT HEALTH QUESTIONNAIRE - PHQ9
2. FEELING DOWN, DEPRESSED OR HOPELESS: 0
SUM OF ALL RESPONSES TO PHQ9 QUESTIONS 1 & 2: 1
SUM OF ALL RESPONSES TO PHQ QUESTIONS 1-9: 1

## 2021-01-07 NOTE — PROGRESS NOTES
1/7/21    Chief Complaint   Patient presents with    Follow-up     patient is here for follow up still having blood pressure issues       Chelita Mckeon, (1955), is a 72 y.o. female, is here for evaluation of the following medical concerns:    HPI    She is here today for blood pressure re-check and to discuss MRI of brain results:    Hypertension:  She followed up with cardiology yesterday and Losartan was added and PRN Diltiazem. Denies chest pain, dizziness, headaches, blurred vision, weakness, numbness, tingling or edema, or palpitations at this time. She has a 4 week f/u scheduled with cardiology. Previous note 12/29/2021:  She is here today for ordered labs and reported to the MA that her blood pressure has been running high at home. I had her added to my schedule to evaluate. She did take the metoprolol and indapamide this am around 0830. She stopped taking the verapamil last week after our appointment due to a misunderstanding. States her blood pressures have been running 150's/80's. Previous appointment labs showed high magnesium, advised her to hold daily magnesium supplement and ordered repeat labs which is what she was here for today. States that her chest pain and palpitations have improved since stopping magnesium supplement. Plan from Dr. Antoinette Mccarthy:  1. Tiredness: could be from uncontrolled HTN, she had cough with lisinopril, will add losartan 50mg daily  2. Palpitations, she had discontinue verapamil 120 mg, will add cardizem 30mg       Prn, her last holter monitor check, it had SVT and was recommended to continue verapamil.will get holter monitor if needed on next visit  3. Dyspnea on exertion:resolved, normal stress test and echo, both reviewed and discussed  4. Chest pressure:normal stress test and echo, will not pursue it  5. Murmur: echo Iis nromal  6. OBESITY: RECOMMEND TO LOSE WEIGHT  7. Dyslipidemia: check lipids  8. HTN: unstable, continue lopressor,start losartan  9.  All labs, medications and tests reviewed, continue all other medications of all above medical condition listed as is. Bradycardia  HR 66 today, improved. Now prescribed diltiazem PRN for palpitations per cardiology. Previous note:  She is bradycardic in the office today. Discussed with my collaborating physician and I will continue to have her hold the verapamil. Repeat Holter monitor ordered due to patient reports of fluctuating heart rate on her smart watch. She is already scheduled with cardiology 1/6/2021 for further evaluation as well.        Chronic Headaches:  Reviewed MRI results and discussed findings. She shares that she has had a slight tremor in her left hand for several years, and does feels off balance at times, not having frequent falls. Referral to neurology given. Previous note 12/29/2020:  Posterior headaches in back of head that has been worsening for 1 month, Blurred vision noticed only when driving and looking at street signs. Has not had a recent eye exam.  She was not able to complete the prior MRI that was ordered last November due to the cost when she had new onset of headaches then. Denies dizziness, balance issues, n/v. Aleeve does help with headaches. Denies current headache today, happens almost daily though.    Was unclear if she should be taking verapamil or not and she stopped taking  All hypertensive readings      States that she did not tolerate Celexa so she stopped    Narrative   EXAMINATION:   MRI OF THE BRAIN WITHOUT AND WITH CONTRAST  1/6/2021 9:32 am       TECHNIQUE:   Multiplanar multisequence MRI of the head/brain was performed without and   with the administration of intravenous contrast.       COMPARISON:   None.       HISTORY:   ORDERING SYSTEM PROVIDED HISTORY: New onset of headaches after age 48   TECHNOLOGIST PROVIDED HISTORY:   Reason for exam:->Continued headaches for 1 year, did not complete previously   ordered MRI   Reason for Exam: headaches  1 Allergies    Past Medical History:   Diagnosis Date    Diabetes mellitus ([address]     H/O 24 hour EKG monitoring 2019    Basic sinus rhythm Minimum HR 41 bpm APC noted brief SVT noted Pt reported symptoms of lightheadedness no arrhythmias noted.  H/O echocardiogram 2019    EF 55-60%, Grade II DD, Mild MR, TR & PHTN.  History of nuclear stress test 2019    normal study.  Hypertension     NELSON on CPAP     Thyroid disease        Past Surgical History:   Procedure Laterality Date    ANKLE FRACTURE SURGERY       SECTION      COLONOSCOPY  2016    int hem, repeat in 10 years    OVARY REMOVAL         Social History     Tobacco Use    Smoking status: Never Smoker    Smokeless tobacco: Never Used   Substance Use Topics    Alcohol use: Yes    Drug use: No       Family History   Problem Relation Age of Onset    Diabetes Mother     Hypertension Mother     Diabetes Father     Hypertension Father     Heart Disease Father     Heart Surgery Father        Lab Results   Component Value Date    WBC 6.4 2020    HGB 14.1 2020    HCT 41.3 2020    MCV 92.4 2020     2020     Lab Results   Component Value Date    LABA1C 5.2 2020     Lab Results   Component Value Date    TSH 2.58 2016     Lab Results   Component Value Date    CHOL 219 (H) 2019    TRIG 173 (H) 2019    HDL 61 (H) 2019    LDLCALC 123 (H) 2019    LABVLDL 35 2019         No results found for this visit on 21. Wt Readings from Last 3 Encounters:   21 216 lb 3.2 oz (98.1 kg)   21 217 lb (98.4 kg)   20 216 lb 9.6 oz (98.2 kg)     . FLOWAMB[6   BP Readings from Last 3 Encounters:   21 (!) 144/82   21 (!) 144/92   20 (!) 152/82     Pulse Readings from Last 3 Encounters:   21 66   21 64   20 58        Physical Exam  Vitals signs and nursing note reviewed.    Constitutional:       General: She is not in acute distress. Appearance: Normal appearance. She is obese. She is not ill-appearing, toxic-appearing or diaphoretic. HENT:      Head: Normocephalic and atraumatic. Nose: Nose normal. No congestion or rhinorrhea. Mouth/Throat:      Mouth: Mucous membranes are moist.   Eyes:      Extraocular Movements: Extraocular movements intact. Conjunctiva/sclera: Conjunctivae normal.      Pupils: Pupils are equal, round, and reactive to light. Neck:      Musculoskeletal: Normal range of motion and neck supple. No neck rigidity or muscular tenderness. Cardiovascular:      Rate and Rhythm: Normal rate and regular rhythm. Pulses: Normal pulses. Heart sounds: Normal heart sounds. Pulmonary:      Effort: Pulmonary effort is normal. No respiratory distress. Breath sounds: Normal breath sounds. No stridor. No wheezing, rhonchi or rales. Chest:      Chest wall: No tenderness. Musculoskeletal: Normal range of motion. General: No swelling or tenderness. Right lower leg: No edema. Left lower leg: No edema. Lymphadenopathy:      Cervical: No cervical adenopathy. Skin:     General: Skin is warm and dry. Capillary Refill: Capillary refill takes less than 2 seconds. Coloration: Skin is not pale. Findings: No bruising, erythema, lesion or rash. Neurological:      General: No focal deficit present. Mental Status: She is alert and oriented to person, place, and time. Motor: No weakness. Coordination: Coordination normal.      Gait: Gait normal.   Psychiatric:         Mood and Affect: Mood normal.         Behavior: Behavior normal.         Thought Content: Thought content normal.         Judgment: Judgment normal.         ASSESSMENT AND PLAN:    1. Essential hypertension  Continue home monitoring with additions of Losartan and PRN Diltiazem. Follow up with cardiology as scheduled. Education provided on DASH diet and exercise.   Continue current medications as prescribed and monitor and track blood pressure BID. Education provided on to seek medical attention if she develops sudden onset of headache, chest pain, numbness/tingling, weakness, or other concerns. - indapamide (LOZOL) 1.25 MG tablet; Take 2 tablets by mouth every morning  Dispense: 60 tablet; Refill: 2    2. New onset of headaches after age 48  Reviewed MRI results and discussed findings. She shares that she has had a slight tremor in her left hand for several years, and does feels off balance at times, not having frequent falls. Referral to neurology given, Dr. Ada Hinson. Return in about 2 months (around 3/7/2021) for HTN.     Karel Frank, DEBORAH - CNP

## 2021-01-07 NOTE — PATIENT INSTRUCTIONS
Patient Education        High Blood Pressure: Care Instructions  Overview     It's normal for blood pressure to go up and down throughout the day. But if it stays up, you have high blood pressure. Another name for high blood pressure is hypertension. Despite what a lot of people think, high blood pressure usually doesn't cause headaches or make you feel dizzy or lightheaded. It usually has no symptoms. But it does increase your risk of stroke, heart attack, and other problems. You and your doctor will talk about your risks of these problems based on your blood pressure. Your doctor will give you a goal for your blood pressure. Your goal will be based on your health and your age. Lifestyle changes, such as eating healthy and being active, are always important to help lower blood pressure. You might also take medicine to reach your blood pressure goal.  Follow-up care is a key part of your treatment and safety. Be sure to make and go to all appointments, and call your doctor if you are having problems. It's also a good idea to know your test results and keep a list of the medicines you take. How can you care for yourself at home? Medical treatment  · If you stop taking your medicine, your blood pressure will go back up. You may take one or more types of medicine to lower your blood pressure. Be safe with medicines. Take your medicine exactly as prescribed. Call your doctor if you think you are having a problem with your medicine. · Talk to your doctor before you start taking aspirin every day. Aspirin can help certain people lower their risk of a heart attack or stroke. But taking aspirin isn't right for everyone, because it can cause serious bleeding. · See your doctor regularly. You may need to see the doctor more often at first or until your blood pressure comes down. · If you are taking blood pressure medicine, talk to your doctor before you take decongestants or anti-inflammatory medicine, such as ibuprofen. Some of these medicines can raise blood pressure. · Learn how to check your blood pressure at home. Lifestyle changes  · Stay at a healthy weight. This is especially important if you put on weight around the waist. Losing even 10 pounds can help you lower your blood pressure. · If your doctor recommends it, get more exercise. Walking is a good choice. Bit by bit, increase the amount you walk every day. Try for at least 30 minutes on most days of the week. You also may want to swim, bike, or do other activities. · Avoid or limit alcohol. Talk to your doctor about whether you can drink any alcohol. · Try to limit how much sodium you eat to less than 2,300 milligrams (mg) a day. Your doctor may ask you to try to eat less than 1,500 mg a day. · Eat plenty of fruits (such as bananas and oranges), vegetables, legumes, whole grains, and low-fat dairy products. · Lower the amount of saturated fat in your diet. Saturated fat is found in animal products such as milk, cheese, and meat. Limiting these foods may help you lose weight and also lower your risk for heart disease. · Do not smoke. Smoking increases your risk for heart attack and stroke. If you need help quitting, talk to your doctor about stop-smoking programs and medicines. These can increase your chances of quitting for good. When should you call for help? Call  911 anytime you think you may need emergency care. This may mean having symptoms that suggest that your blood pressure is causing a serious heart or blood vessel problem. Your blood pressure may be over 180/120. For example, call 911 if:    · You have symptoms of a heart attack. These may include:  ? Chest pain or pressure, or a strange feeling in the chest.  ? Sweating. ? Shortness of breath. ? Nausea or vomiting. ? Pain, pressure, or a strange feeling in the back, neck, jaw, or upper belly or in one or both shoulders or arms. ? Lightheadedness or sudden weakness. ? A fast or irregular heartbeat.     · You have symptoms of a stroke. These may include:  ? Sudden numbness, tingling, weakness, or loss of movement in your face, arm, or leg, especially on only one side of your body. ? Sudden vision changes. ? Sudden trouble speaking. ? Sudden confusion or trouble understanding simple statements. ? Sudden problems with walking or balance. ? A sudden, severe headache that is different from past headaches.     · You have severe back or belly pain. Do not wait until your blood pressure comes down on its own. Get help right away. Call your doctor now or seek immediate care if:    · Your blood pressure is much higher than normal (such as 180/120 or higher), but you don't have symptoms.     · You think high blood pressure is causing symptoms, such as:  ? Severe headache.  ? Blurry vision. Watch closely for changes in your health, and be sure to contact your doctor if:    · Your blood pressure measures higher than your doctor recommends at least 2 times. That means the top number is higher or the bottom number is higher, or both.     · You think you may be having side effects from your blood pressure medicine. Where can you learn more? Go to https://aspen.[employer]. org and sign in to your [employer] account. Enter R438 in the [facility] box to learn more about \"High Blood Pressure: Care Instructions. \"     If you do not have an account, please click on the \"Sign Up Now\" link. Current as of: December 16, 2019               Content Version: 12.6  © 1261-6549 [employer]. Care instructions adapted under license by [facility] ([facility]). If you have questions about a medical condition or this instruction, always ask your healthcare professional. [address] any warranty or liability for your use of this information. Patient Education        Low Sodium Diet (2,000 Milligram): Care Instructions  Your Care Instructions     Too much sodium causes your body to hold on to extra water. This can raise your blood pressure and force your heart and kidneys to work harder. In very serious cases, this could cause you to be put in the hospital. It might even be life-threatening. By limiting sodium, you will feel better and lower your risk of serious problems. The most common source of sodium is salt. People get most of the salt in their diet from canned, prepared, and packaged foods. Fast food and restaurant meals also are very high in sodium. Your doctor will probably limit your sodium to less than 2,000 milligrams (mg) a day. This limit counts all the sodium in prepared and packaged foods and any salt you add to your food. Follow-up care is a key part of your treatment and safety. Be sure to make and go to all appointments, and call your doctor if you are having problems. It's also a good idea to know your test results and keep a list of the medicines you take. How can you care for yourself at home? Read food labels  · Read labels on cans and food packages. The labels tell you how much sodium is in each serving. Make sure that you look at the serving size. If you eat more than the serving size, you have eaten more sodium. · Food labels also tell you the Percent Daily Value for sodium. Choose products with low Percent Daily Values for sodium. · Be aware that sodium can come in forms other than salt, including monosodium glutamate (MSG), sodium citrate, and sodium bicarbonate (baking soda). MSG is often added to Asian food. When you eat out, you can sometimes ask for food without MSG or added salt. Buy low-sodium foods  · Buy foods that are labeled \"unsalted\" (no salt added), \"sodium-free\" (less than 5 mg of sodium per serving), or \"low-sodium\" (less than 140 mg of sodium per serving). Foods labeled \"reduced-sodium\" and \"light sodium\" may still have too much sodium. Be sure to read the label to see how much sodium you are getting. · Buy fresh vegetables, or frozen vegetables without added sauces. Buy low-sodium versions of canned vegetables, soups, and other canned goods. Prepare low-sodium meals  · Cut back on the amount of salt you use in cooking. This will help you adjust to the taste. Do not add salt after cooking. One teaspoon of salt has about 2,300 mg of sodium. · Take the salt shaker off the table. · Flavor your food with garlic, lemon juice, onion, vinegar, herbs, and spices. Do not use soy sauce, lite soy sauce, steak sauce, onion salt, garlic salt, celery salt, mustard, or ketchup on your food. · Use low-sodium salad dressings, sauces, and ketchup. Or make your own salad dressings and sauces without adding salt. · Use less salt (or none) when recipes call for it. You can often use half the salt a recipe calls for without losing flavor. Other foods such as rice, pasta, and grains do not need added salt. · Rinse canned vegetables, and cook them in fresh water. This removes somebut not allof the salt. · Avoid water that is naturally high in sodium or that has been treated with water softeners, which add sodium. Call your local water company to find out the sodium content of your water supply. If you buy bottled water, read the label and choose a sodium-free brand.   Avoid high-sodium foods  · Avoid eating: ? Smoked, cured, salted, and canned meat, fish, and poultry. ? Ham, mast, hot dogs, and luncheon meats. ? Regular, hard, and processed cheese and regular peanut butter. ? Crackers with salted tops, and other salted snack foods such as pretzels, chips, and salted popcorn. ? Frozen prepared meals, unless labeled low-sodium. ? Canned and dried soups, broths, and bouillon, unless labeled sodium-free or low-sodium. ? Canned vegetables, unless labeled sodium-free or low-sodium. ? [place] fries, pizza, tacos, and other fast foods. ? Pickles, olives, ketchup, and other condiments, especially soy sauce, unless labeled sodium-free or low-sodium. Where can you learn more? Go to https://[employer] Servicespepiceweb.[employer]. org and sign in to your [employer] account. Enter O681 in the [employer] box to learn more about \"Low Sodium Diet (2,000 Milligram): Care Instructions. \"     If you do not have an account, please click on the \"Sign Up Now\" link. Current as of: August 22, 2019               Content Version: 12.6  © 5667-3293 [employer]. Care instructions adapted under license by [facility] ([facility]). If you have questions about a medical condition or this instruction, always ask your healthcare professional. [place] [address] any warranty or liability for your use of this information.

## 2021-01-21 DIAGNOSIS — I10 ESSENTIAL HYPERTENSION: ICD-10-CM

## 2021-01-21 RX ORDER — INDAPAMIDE 1.25 MG/1
2.5 TABLET, FILM COATED ORAL EVERY MORNING
Qty: 60 TABLET | Refills: 2 | Status: SHIPPED | OUTPATIENT
Start: 2021-01-21 | End: 2021-06-09

## 2021-02-01 ENCOUNTER — TELEPHONE (OUTPATIENT)
Dept: FAMILY MEDICINE CLINIC | Age: 66
End: 2021-02-01

## 2021-02-01 DIAGNOSIS — F32.9 REACTIVE DEPRESSION: Primary | ICD-10-CM

## 2021-02-02 ENCOUNTER — OFFICE VISIT (OUTPATIENT)
Dept: PSYCHOLOGY | Age: 66
End: 2021-02-02
Payer: COMMERCIAL

## 2021-02-02 DIAGNOSIS — R90.82 WHITE MATTER ABNORMALITY ON MRI OF BRAIN: ICD-10-CM

## 2021-02-02 DIAGNOSIS — F41.9 ANXIETY: Primary | ICD-10-CM

## 2021-02-02 DIAGNOSIS — R51.9 NEW ONSET OF HEADACHES AFTER AGE 50: Primary | ICD-10-CM

## 2021-02-02 PROCEDURE — 90791 PSYCH DIAGNOSTIC EVALUATION: CPT | Performed by: PSYCHOLOGIST

## 2021-02-02 NOTE — PROGRESS NOTES
Behavioral Health Consultation  Livan Neves Psy.D. Psychologist    Time spent with Patient: 30 minutes  Visit number: 1  Reason for Consult:  anxiety  Referring Provider: DEBORAH Mukherjee CNP  [address] [facility]  [id number]  [place],  [address] [place] [address]    Informed consent:  Pt provided informed consent for the behavioral health program. Discussed with patient model of service to include the limits of confidentiality (i.e. abuse reporting, suicide intervention, etc.) and focused intervention approach. Pt indicated understanding. S:  ----------------------------------------------------------------------------------------------------------------------    Presenting problem:  anxiety  \"I'm dealing with a lot of things. \" Stated greatest concern is, \"I'm just not the same over the past two years. \"  Marked decline in \"ability to deal with everything\" over the pat two months. Id'd stressors as: work, marriage, son-in-law and 5 children living w them (moved in early December). \"I can't handle all the pressure. \" Feels \"resentful and jealous\" of her son-in-law. Explained  has had two sons die and he \"cares more than he should and enables\" his youngest son. \"I know if he has to pick between me and them he would choose them. \"     Worry, anxiety, difficulty relaxing, irritability, racing and ruminative thoughts. Remarked there are times she will \"just spazz out. \"     As recently as 2 mos ago was drinking 6 shots/d. Over past 2 years as noticed neurological sx. \"I don't feel like I can process things. \" Has become increasingly forgetful. Incr word finding difficulty (none present during exam). Denied getting lost. FH+     Social:   Working 4d/week at [employer] \"I do a lot of things there. \" [place] her first  was cheating on her 6 years ago. They  at the time. Pt remarried a friend. Described her  as \"narcissitic. \" They have been  5 years. \"Whenever I say something it's not good enough and he tells me why I'm wrong. \" Stated he is \"worse when he drinks. \" He is 73yo    Substance Use:   EtOH: \"used to be a lot. \" Was drinking 6 drinks/d as recently as 2 weeks ago. Over past 2 mos only eats when they go out   Cannabis: daily since early 25s. Less now than 6 years ago. Tobacco: denied    Other: denied     Psychiatric tx hx:    Psych meds: celexa in the past, none presently. \"I didn't like the way I felt. \"   Outpatient psychotherapy: denied     Inpatient psych hospitalizations: denied     Relevant medical conditions/concerns:  Neurology referral after MRI revealed white matter abnormalities     Goals:  \"I want to figure out what's going on. \"       O:  ----------------------------------------------------------------------------------------------------------------------  MSE:    Orientation:  oriented to person, place, time, and general circumstances  Appearance and behavior:  alert, cooperative  Speech:  spontaneous, normal rate and normal volume  Mood: anxious   Thought Content:  intact  Thought Process:  linear, goal directed and coherent  Interest/Pleasure: Loss of Pleasure/Fun  Sleep disturbance: Yes  Motivation: Poor  Energy: Tired/Fatigued  Morbid ideation No  Suicide Assessment: no suicidal ideation    A:  ----------------------------------------------------------------------------------------------------------------------  Diagnosis:    1.  Anxiety         PHQ Scores 1/7/2021 12/29/2020 12/11/2020 12/11/2020 1/23/2020 12/6/2019 11/11/2019   PHQ2 Score 1 0 - 1 0 0 0   PHQ9 Score 1 0 1 1 0 0 0 Interpretation of Total Score Depression Severity: 1-4 = Minimal depression, 5-9 = Mild depression, 10-14 = Moderate depression, 15-19 = Moderately severe depression, 20-27 = Severe depression    P:  ----------------------------------------------------------------------------------------------------------------------  MMSE/MoCA at next visit. R/O anxiety vs dementia    [x]Discussed potential treatments for   1. Anxiety       [x]Conducted functional assessment  [x]Established rapport  [x]Supportive interventions   [x][place]-setting to identify pt's primary goals for [facility] visit / overall health  [x]Provided psychoeducation/handout on   1. Anxiety            Other:   []     Pt Behavioral Change Plan:    1. Return to Dr. Eleanor Dumont in 2 week(s)    2. This note will not be viewable in [facility] for the following reason(s). This is a Psychotherapy Note.           Feedback provided to pt's PCP via EPIC and/or oral report

## 2021-02-22 DIAGNOSIS — E03.9 ACQUIRED HYPOTHYROIDISM: ICD-10-CM

## 2021-02-22 RX ORDER — LEVOTHYROXINE SODIUM 112 UG/1
TABLET ORAL
Qty: 30 TABLET | Refills: 3 | Status: SHIPPED | OUTPATIENT
Start: 2021-02-22 | End: 2021-06-21

## 2021-02-24 ENCOUNTER — HOSPITAL ENCOUNTER (OUTPATIENT)
Age: 66
Discharge: HOME | End: 2021-02-24
Payer: COMMERCIAL

## 2021-02-24 ENCOUNTER — OFFICE VISIT (OUTPATIENT)
Dept: CARDIOLOGY CLINIC | Age: 66
End: 2021-02-24
Payer: COMMERCIAL

## 2021-02-24 VITALS
BODY MASS INDEX: 36.15 KG/M2 | HEART RATE: 60 BPM | HEIGHT: 65 IN | SYSTOLIC BLOOD PRESSURE: 152 MMHG | DIASTOLIC BLOOD PRESSURE: 90 MMHG | WEIGHT: 217 LBS | TEMPERATURE: 97.4 F | RESPIRATION RATE: 16 BRPM

## 2021-02-24 DIAGNOSIS — E78.5 DYSLIPIDEMIA: Primary | ICD-10-CM

## 2021-02-24 LAB
CHOLESTEROL, FASTING: 209 MG/DL
HDLC SERPL-MCNC: 57 MG/DL
LDL CHOLESTEROL DIRECT: 141 MG/DL
TRIGLYCERIDE, FASTING: 121 MG/DL

## 2021-02-24 PROCEDURE — 36415 COLL VENOUS BLD VENIPUNCTURE: CPT

## 2021-02-24 PROCEDURE — 99214 OFFICE O/P EST MOD 30 MIN: CPT | Performed by: INTERNAL MEDICINE

## 2021-02-24 PROCEDURE — 80061 LIPID PANEL: CPT

## 2021-02-24 RX ORDER — LOSARTAN POTASSIUM 50 MG/1
75 TABLET ORAL DAILY
Qty: 120 TABLET | Refills: 3 | Status: SHIPPED | OUTPATIENT
Start: 2021-02-24 | End: 2021-05-19

## 2021-02-24 NOTE — PROGRESS NOTES
 H/O echocardiogram 2019    EF 55-60%, Grade II DD, Mild MR, TR & PHTN.  History of nuclear stress test 2019    normal study.  Hypertension     NELSON on CPAP     Thyroid disease      Past Surgical History:   Procedure Laterality Date    ANKLE FRACTURE SURGERY       SECTION      COLONOSCOPY  2016    int hem, repeat in 10 years    OVARY REMOVAL       Family History   Problem Relation Age of Onset    Diabetes Mother     Hypertension Mother     Diabetes Father     Hypertension Father     Heart Disease Father     Heart Surgery Father      Social History     Tobacco Use    Smoking status: Never Smoker    Smokeless tobacco: Never Used   Substance Use Topics    Alcohol use: Yes      [unfilled]  Review of Systems:   · Constitutional: No Fever or Weight Loss   · Eyes: No Decreased Vision  · ENT: No Headaches, Hearing Loss or Vertigo  · Cardiovascular: No chest pain, dyspnea on exertion, palpitations or loss of consciousness  · Respiratory: No cough or wheezing    · Gastrointestinal: No abdominal pain, appetite loss, blood in stools, constipation, diarrhea or heartburn  · Genitourinary: No dysuria, trouble voiding, or hematuria  · Musculoskeletal:  No gait disturbance, weakness or joint complaints  · Integumentary: No rash or pruritis  · Neurological: No TIA or stroke symptoms  · Psychiatric: No anxiety or depression  · Endocrine: No malaise, fatigue or temperature intolerance  · Hematologic/Lymphatic: No bleeding problems, blood clots or swollen lymph nodes  · Allergic/Immunologic: No nasal congestion or hives  All systems negative except as marked. Objective:  BP (!) 152/90   Pulse 60   Temp 97.4 °F (36.3 °C)   Resp 16   Ht 5' 5\" (1.651 m)   Wt 217 lb (98.4 kg)   LMP  (LMP Unknown)   BMI 36.11 kg/m²   Wt Readings from Last 3 Encounters:   21 217 lb (98.4 kg)   21 221 lb (100.2 kg)   21 216 lb 3.2 oz (98.1 kg)     Body mass index is 36.11 kg/m². GENERAL - Alert, oriented, pleasant, in no apparent distress,normal grooming  HEENT  pupils are intact, cornea intact, conjunctive normal color, ears are normal in exam,  Neck - Supple. No jugular venous distention noted. No carotid bruits, no apical -carotid delay  Respiratory:  Normal breath sounds, No respiratory distress, No wheezing, No chest tenderness. ,no use of accessory muscles, diaphragm movement is normal  Cardiovascular: (PMI) apex non displaced,no lifts no thrills, no s3,no s4, Normal heart rate, Normal rhythm, No murmurs, No rubs, No gallops. Carotid arteries pulse and amplitude are normal no bruit, no abdominal bruit noted ( normal abdominal aorta ausculation),   Extremities - No cyanosis, clubbing, or significant edema, no varicose veins    Abdomen  No masses, tenderness, or organomegaly, no hepato-splenomegally, no bruits  Musculoskeletal  No significant edema, no kyphosis or scoliosis, no deformity in any extremity noted, muscle strength and tone are normal  Skin: no ulcer,no scar,no stasis dermatitis   Neurologic  alert oriented times 3,Cranial nerves II through XII are grossly intact. There were no gross focal neurologic abnormalities.    Psychiatric: normal mood and affect    Lab Results   Component Value Date    TROPONINI <0.006 05/12/2013     BNP:    Lab Results   Component Value Date    BNP 60.2 05/11/2013     PT/INR:  No results found for: PTINR  Lab Results   Component Value Date    LABA1C 5.2 08/18/2020    LABA1C 5.0 06/05/2018     Lab Results   Component Value Date    CHOL 219 (H) 11/04/2019    TRIG 173 (H) 11/04/2019    HDL 61 (H) 11/04/2019    LDLCALC 123 (H) 11/04/2019     Lab Results   Component Value Date    ALT 14 12/11/2020    AST 18 12/11/2020     TSH:    Lab Results   Component Value Date    TSH 2.58 09/14/2016 Impression:  Darylene Bowling is a 72 y. o.year old who  has a past medical history of Diabetes mellitus ([facility] [address] H/O 24 hour EKG monitoring, H/O echocardiogram, History of nuclear stress test, Hypertension, NELSON on CPAP, and Thyroid disease. and presents with     Plan:  1. Tiredness: could be from sleep apnea, and could be from uncontrolled HTN, will increase losartan 75mg daily  2. Palpitations, she had discontinue verapamil 120 mg, did not use cardizem 30mgm hold off on holter monitor, she had apple watch and tracks her heart rate for now. 3. Sleep apnea: agree with follow up with Pulmonary  4. Dyspnea on exertion:resolved, normal stress test and echo, both reviewed and discussed  5. Chest pressure:normal stress test and echo, will not pursue it  6. Murmur: echo Iis nromal  7. OBESITY: RECOMMEND TO LOSE WEIGHT  8. Dyslipidemia: check lipids  9. HTN: unstable, continue lopressor,increaselosartan to 75 mg daily, Avoid red meat, processed meat, and salt,start exercise, lose weight, increase fresh fruits, green vegetable and nuts  All labs, medications and tests reviewed, continue all other medications of all above medical condition listed as is.     [unfilled]

## 2021-03-02 ENCOUNTER — OFFICE VISIT (OUTPATIENT)
Dept: PSYCHOLOGY | Age: 66
End: 2021-03-02
Payer: COMMERCIAL

## 2021-03-02 DIAGNOSIS — F41.9 ANXIETY: Primary | ICD-10-CM

## 2021-03-02 PROCEDURE — 90832 PSYTX W PT 30 MINUTES: CPT | Performed by: PSYCHOLOGIST

## 2021-03-02 NOTE — PROGRESS NOTES
Severity: 1-4 = Minimal depression, 5-9 = Mild depression, 10-14 = Moderate depression, 15-19 = Moderately severe depression, 20-27 = Severe depression    P:  ----------------------------------------------------------------------------------------------------------------------  MMSE at next visit    General:   [x] [place]-setting to identify pt's primary goals for [facility] visit / overall health   [x] Provided psychoeducation/handout on:   1. Anxiety        [x]  Supportive interventions    Cognitive:   [x] Trained in strategies for increasing balanced thinking   [x] Cognitive strategies to target current mental health sx    [x] Identified and challenged maladaptive thoughts    Behavioral:   [x] Discussed and set plan for behavioral activation   [x] Discussed and problem-solved barriers in adhering to behavioral change plan   [x] Motivational Interviewing to increase patient confidence and compliance with       adhering to behavioral change plan   [x] Discussed potential barriers to change   [x] Discussed self-care (sleep, nutrition, rewarding activities, social support, exercise)    Other:   []   []   []   []    Recommendations to patient:    1. Return to Dr. Esther Bentley in 2 week(s)    2. This note will not be viewable in [facility] for the following reason(s). This is a Psychotherapy Note.         Feedback provided to pt's PCP via EPIC and/or oral report

## 2021-03-19 ENCOUNTER — OFFICE VISIT (OUTPATIENT)
Dept: FAMILY MEDICINE CLINIC | Age: 66
End: 2021-03-19
Payer: COMMERCIAL

## 2021-03-19 VITALS
SYSTOLIC BLOOD PRESSURE: 150 MMHG | HEART RATE: 54 BPM | HEIGHT: 64 IN | DIASTOLIC BLOOD PRESSURE: 90 MMHG | TEMPERATURE: 97.2 F | WEIGHT: 212.8 LBS | OXYGEN SATURATION: 96 % | BODY MASS INDEX: 36.33 KG/M2

## 2021-03-19 DIAGNOSIS — E03.9 ACQUIRED HYPOTHYROIDISM: ICD-10-CM

## 2021-03-19 DIAGNOSIS — Z12.31 ENCOUNTER FOR SCREENING MAMMOGRAM FOR MALIGNANT NEOPLASM OF BREAST: ICD-10-CM

## 2021-03-19 DIAGNOSIS — R00.2 PALPITATIONS: ICD-10-CM

## 2021-03-19 DIAGNOSIS — Z13.820 OSTEOPOROSIS SCREENING: ICD-10-CM

## 2021-03-19 DIAGNOSIS — I10 ESSENTIAL HYPERTENSION: ICD-10-CM

## 2021-03-19 DIAGNOSIS — Z00.00 ROUTINE GENERAL MEDICAL EXAMINATION AT A HEALTH CARE FACILITY: ICD-10-CM

## 2021-03-19 DIAGNOSIS — Z00.00 ROUTINE GENERAL MEDICAL EXAMINATION AT A HEALTH CARE FACILITY: Primary | ICD-10-CM

## 2021-03-19 DIAGNOSIS — R90.82 WHITE MATTER ABNORMALITY ON MRI OF BRAIN: ICD-10-CM

## 2021-03-19 LAB
A/G RATIO: 1.5 (ref 1.1–2.2)
ALBUMIN SERPL-MCNC: 4.3 G/DL (ref 3.4–5)
ALP BLD-CCNC: 58 U/L (ref 40–129)
ALT SERPL-CCNC: 13 U/L (ref 10–40)
ANION GAP SERPL CALCULATED.3IONS-SCNC: 13 MMOL/L (ref 3–16)
AST SERPL-CCNC: 17 U/L (ref 15–37)
BASOPHILS ABSOLUTE: 0.1 K/UL (ref 0–0.2)
BASOPHILS RELATIVE PERCENT: 0.9 %
BILIRUB SERPL-MCNC: 0.5 MG/DL (ref 0–1)
BUN BLDV-MCNC: 24 MG/DL (ref 7–20)
CALCIUM SERPL-MCNC: 9.5 MG/DL (ref 8.3–10.6)
CHLORIDE BLD-SCNC: 100 MMOL/L (ref 99–110)
CO2: 25 MMOL/L (ref 21–32)
CREAT SERPL-MCNC: 1.1 MG/DL (ref 0.6–1.2)
EOSINOPHILS ABSOLUTE: 0.1 K/UL (ref 0–0.6)
EOSINOPHILS RELATIVE PERCENT: 2.2 %
GFR AFRICAN AMERICAN: >60
GFR NON-AFRICAN AMERICAN: 50
GLOBULIN: 2.8 G/DL
GLUCOSE BLD-MCNC: 102 MG/DL (ref 70–99)
HCT VFR BLD CALC: 41.4 % (ref 36–48)
HEMOGLOBIN: 14.3 G/DL (ref 12–16)
LYMPHOCYTES ABSOLUTE: 1.4 K/UL (ref 1–5.1)
LYMPHOCYTES RELATIVE PERCENT: 20.9 %
MCH RBC QN AUTO: 30.9 PG (ref 26–34)
MCHC RBC AUTO-ENTMCNC: 34.6 G/DL (ref 31–36)
MCV RBC AUTO: 89.4 FL (ref 80–100)
MONOCYTES ABSOLUTE: 0.3 K/UL (ref 0–1.3)
MONOCYTES RELATIVE PERCENT: 4.1 %
NEUTROPHILS ABSOLUTE: 4.7 K/UL (ref 1.7–7.7)
NEUTROPHILS RELATIVE PERCENT: 71.9 %
PDW BLD-RTO: 13.6 % (ref 12.4–15.4)
PLATELET # BLD: 175 K/UL (ref 135–450)
PMV BLD AUTO: 11.7 FL (ref 5–10.5)
POTASSIUM SERPL-SCNC: 3.9 MMOL/L (ref 3.5–5.1)
RBC # BLD: 4.64 M/UL (ref 4–5.2)
SODIUM BLD-SCNC: 138 MMOL/L (ref 136–145)
TOTAL PROTEIN: 7.1 G/DL (ref 6.4–8.2)
TSH REFLEX FT4: 2.73 UIU/ML (ref 0.27–4.2)
VITAMIN D 25-HYDROXY: 80.4 NG/ML
WBC # BLD: 6.6 K/UL (ref 4–11)

## 2021-03-19 PROCEDURE — 99214 OFFICE O/P EST MOD 30 MIN: CPT | Performed by: PHYSICIAN ASSISTANT

## 2021-03-19 RX ORDER — METOPROLOL TARTRATE 50 MG/1
TABLET, FILM COATED ORAL
Qty: 180 TABLET | Refills: 2 | Status: SHIPPED | OUTPATIENT
Start: 2021-03-19 | End: 2021-06-22 | Stop reason: SDUPTHER

## 2021-03-19 ASSESSMENT — ENCOUNTER SYMPTOMS
SINUS PRESSURE: 0
BACK PAIN: 0
WHEEZING: 0
CHEST TIGHTNESS: 0
TROUBLE SWALLOWING: 0
PHOTOPHOBIA: 0
ABDOMINAL PAIN: 0
SINUS PAIN: 0
VOMITING: 0
BLOOD IN STOOL: 0
RHINORRHEA: 0
COUGH: 0
CONSTIPATION: 0
SORE THROAT: 0
SHORTNESS OF BREATH: 0
NAUSEA: 0
DIARRHEA: 0
EYE PAIN: 0

## 2021-03-19 ASSESSMENT — PATIENT HEALTH QUESTIONNAIRE - PHQ9
1. LITTLE INTEREST OR PLEASURE IN DOING THINGS: 0
SUM OF ALL RESPONSES TO PHQ QUESTIONS 1-9: 1

## 2021-03-19 NOTE — PROGRESS NOTES
mouth once daily 30 tablet 3    indapamide (LOZOL) 1.25 MG tablet Take 2 tablets by mouth every morning 60 tablet 2    dilTIAZem (CARDIZEM) 30 MG tablet Take 1 tablet by mouth as needed (palpitation) 120 tablet 3    UNABLE TO FIND Home blood pressure monitor and cuff 1 Units 0     No current facility-administered medications for this visit. Review of Systems   Constitutional: Negative for activity change, appetite change, chills, diaphoresis, fever and unexpected weight change. HENT: Negative for congestion, dental problem, ear pain, hearing loss, mouth sores, nosebleeds, postnasal drip, rhinorrhea, sinus pressure, sinus pain, sore throat, tinnitus and trouble swallowing. Eyes: Negative for photophobia, pain and visual disturbance. Respiratory: Negative for cough, chest tightness, shortness of breath and wheezing. Cardiovascular: Negative for chest pain, palpitations and leg swelling. Gastrointestinal: Negative for abdominal pain, blood in stool, constipation, diarrhea, nausea and vomiting. Endocrine: Negative for cold intolerance, heat intolerance, polydipsia and polyuria. Genitourinary: Negative for difficulty urinating, dysuria, flank pain, frequency, hematuria and urgency. Musculoskeletal: Negative for arthralgias, back pain, gait problem, joint swelling, myalgias, neck pain and neck stiffness. Skin: Negative for pallor, rash and wound. Allergic/Immunologic: Negative for environmental allergies, food allergies and immunocompromised state. Neurological: Positive for tremors ( Left arm resting and intention tremor). Negative for dizziness, syncope, weakness, light-headedness, numbness and headaches. Short-term memory recall impairment   Hematological: Negative for adenopathy. Does not bruise/bleed easily. Psychiatric/Behavioral: Negative for confusion, decreased concentration, dysphoric mood, self-injury, sleep disturbance and suicidal ideas.  The patient is not facility  -     CBC Auto Differential; Future  -     Comprehensive Metabolic Panel; Future  -     TSH WITH REFLEX TO FT4; Future  -     Vitamin D 25 Hydroxy; Future  -     HEMOGLOBIN A1C; Future  2. Palpitations  -1 recent episode which resolved on its own; patient has not had to use Cardizem for palpitation symptoms. 3. Essential hypertension   -Patient is still hypertensive; reviewed previous note from cardiology and discussed with patient. Losartan recently increased from 50 to 75 mg daily. Patient to continue with dietary approaches to stop hypertension. Defer to cardiology for further medication adjustments etc. patient has appointment follow-up scheduled with Dr. Cristian Lima. -     CBC Auto Differential; Future  -     Comprehensive Metabolic Panel; Future  -     metoprolol tartrate (LOPRESSOR) 50 MG tablet; Take 1 tablet by mouth twice daily, Disp-180 tablet, R-2Normal  4. Encounter for screening mammogram for malignant neoplasm of breast  -     [facility] K[facility]ASH DIGITAL SCREEN BILATERAL; Future  5. Osteoporosis screening  -     Vitamin D 25 Hydroxy; Future  -     DEXA BONE DENSITY AXIAL SKELETON; Future  6. Acquired hypothyroidism  -     TSH WITH REFLEX TO FT4; Future   -Continue thyroid replacement  7. White matter abnormality on MRI of brain   -Discussed MRI results with patient and reviewed the possible underlying etiologies; advised patient to follow-up with neurology as scheduled. Discussed effectiveness of exercise on reduction and improvement of cognitive impairment. Return in about 3 months (around 6/19/2021), or if symptoms worsen or fail to improve, for Follow Up. An electronic signature was used to authenticate this note.     --PAULINA Bills

## 2021-03-20 LAB
ESTIMATED AVERAGE GLUCOSE: 108.3 MG/DL
HBA1C MFR BLD: 5.4 %

## 2021-04-12 ENCOUNTER — HOSPITAL ENCOUNTER (OUTPATIENT)
Dept: WOMENS IMAGING | Age: 66
Discharge: HOME | End: 2021-04-12
Payer: COMMERCIAL

## 2021-04-12 ENCOUNTER — ANCILLARY ORDERS (OUTPATIENT)
Dept: WOMENS IMAGING | Age: 66
End: 2021-04-12

## 2021-04-12 DIAGNOSIS — Z12.31 VISIT FOR SCREENING MAMMOGRAM: ICD-10-CM

## 2021-04-12 DIAGNOSIS — Z13.820 OSTEOPOROSIS SCREENING: ICD-10-CM

## 2021-04-12 DIAGNOSIS — Z12.31 ENCOUNTER FOR SCREENING MAMMOGRAM FOR MALIGNANT NEOPLASM OF BREAST: ICD-10-CM

## 2021-04-12 DIAGNOSIS — R92.8 ABNORMAL MAMMOGRAM: Primary | ICD-10-CM

## 2021-04-12 PROCEDURE — 77067 SCR MAMMO BI INCL CAD: CPT

## 2021-04-12 PROCEDURE — 77080 DXA BONE DENSITY AXIAL: CPT

## 2021-04-12 NOTE — RESULT ENCOUNTER NOTE
Please call pt to inform of DEXA results; Overall, normal bone density in spine and hip. Continue with Vitamin D / Calcium supplement.

## 2021-04-12 NOTE — RESULT ENCOUNTER NOTE
Yulia:    Hi Mrs Nixon Mews; Your mammogram shows a little asymmetry and it was difficult for the radiologist to get a good read so they are recommending some follow up imaging with a more advanced mammogram technique and possible ultrasound. I've put those orders in for you so you should be hearing from scheduling sometime this week to get that done. Hope you're doing well. Let me know if you have any other questions or concerns.    Thanks  Ruben Ulrich

## 2021-04-13 ENCOUNTER — OFFICE VISIT (OUTPATIENT)
Dept: PSYCHOLOGY | Age: 66
End: 2021-04-13
Payer: COMMERCIAL

## 2021-04-13 DIAGNOSIS — F41.9 ANXIETY: Primary | ICD-10-CM

## 2021-04-13 PROCEDURE — 90834 PSYTX W PT 45 MINUTES: CPT | Performed by: PSYCHOLOGIST

## 2021-04-13 ASSESSMENT — PATIENT HEALTH QUESTIONNAIRE - PHQ9
SUM OF ALL RESPONSES TO PHQ QUESTIONS 1-9: 6
1. LITTLE INTEREST OR PLEASURE IN DOING THINGS: 0
7. TROUBLE CONCENTRATING ON THINGS, SUCH AS READING THE NEWSPAPER OR WATCHING TELEVISION: 1
2. FEELING DOWN, DEPRESSED OR HOPELESS: 1
4. FEELING TIRED OR HAVING LITTLE ENERGY: 1
8. MOVING OR SPEAKING SO SLOWLY THAT OTHER PEOPLE COULD HAVE NOTICED. OR THE OPPOSITE, BEING SO FIGETY OR RESTLESS THAT YOU HAVE BEEN MOVING AROUND A LOT MORE THAN USUAL: 0
6. FEELING BAD ABOUT YOURSELF - OR THAT YOU ARE A FAILURE OR HAVE LET YOURSELF OR YOUR FAMILY DOWN: 1
9. THOUGHTS THAT YOU WOULD BE BETTER OFF DEAD, OR OF HURTING YOURSELF: 0

## 2021-04-13 NOTE — PROGRESS NOTES
Behavioral Health Consultation  Luz Elena Delgado Psy.D. Psychologist      Time spent with Patient: 40 minutes  Visit number: 3  Reason for Consult:  anxiety  Referring Provider: PAULINA Bucio  [address] [facility]  [address]  [place],  [facility], [facility] S[facility]    S:  ----------------------------------------------------------------------------------------------------------------------  Anxiety  \"Things are getting a little better. \"  was \"in crisis\" two weeks ago and becoming suicidal. He has since started prozac and \"is doing a lot better. \" Concerned her  is \"devastated. \" Pt's mood and anxiety have both improved. No evidence of word-finding difficulty. No evidence of disorientation. No signs of dementia/delirium    O:  ----------------------------------------------------------------------------------------------------------------------  MSE:  Orientation:  oriented to person, place, time, and general circumstances  Appearance and behavior:  alert, cooperative, emotionally labile, tearful   Speech:  spontaneous, normal rate and normal volume  Mood: anxious   Thought Content:  intact  Thought Process:  linear, goal directed and coherent  Interest/Pleasure: Loss of Pleasure/Fun  Sleep disturbance: Yes  Motivation: Poor  Energy: Tired/Fatigued  Morbid ideation No  Suicide Assessment: no suicidal ideation    A:  ----------------------------------------------------------------------------------------------------------------------  Diagnosis:    1.  Anxiety         PHQ Scores 4/13/2021 3/19/2021 1/7/2021 12/29/2020 12/11/2020 12/11/2020 1/23/2020   PHQ2 Score 1 1 1 0 - 1 0   PHQ9 Score 6 1 1 0 1 1 0     Interpretation of Total Score Depression Severity: 1-4 = Minimal depression, 5-9 = Mild depression, 10-14 = Moderate depression, 15-19 = Moderately severe depression, 20-27 = Severe depression    P:  ---------------------------------------------------------------------------------------------------------------------- Discussed her pattern of enabling her     General:   [x] Slaughter-setting to identify pt's primary goals for [address] visit / overall health   [x] Provided psychoeducation/handout on:   1. Anxiety        [x]  Supportive interventions    Cognitive:   [x] Trained in strategies for increasing balanced thinking   [x] Cognitive strategies to target current mental health sx    [x] Identified and challenged maladaptive thoughts    Behavioral:   [x] Discussed and set plan for behavioral activation   [x] Discussed and problem-solved barriers in adhering to behavioral change plan   [x] Motivational Interviewing to increase patient confidence and compliance with       adhering to behavioral change plan   [x] Discussed potential barriers to change   [x] Discussed self-care (sleep, nutrition, rewarding activities, social support, exercise)    Other:   []   []   []   []    Recommendations to patient:    1. Return to Dr. Acuña All in 2 week(s)    2. This note will not be viewable in [employer] for the following reason(s). This is a Psychotherapy Note.         Feedback provided to pt's PCP via EPIC and/or oral report

## 2021-04-16 ENCOUNTER — HOSPITAL ENCOUNTER (OUTPATIENT)
Dept: ULTRASOUND IMAGING | Age: 66
Discharge: HOME | End: 2021-04-16
Payer: COMMERCIAL

## 2021-04-16 ENCOUNTER — HOSPITAL ENCOUNTER (OUTPATIENT)
Dept: WOMENS IMAGING | Age: 66
Discharge: HOME | End: 2021-04-16
Payer: COMMERCIAL

## 2021-04-16 DIAGNOSIS — R92.8 ABNORMAL MAMMOGRAM: ICD-10-CM

## 2021-04-16 PROCEDURE — G0279 TOMOSYNTHESIS, MAMMO: HCPCS

## 2021-05-19 RX ORDER — LOSARTAN POTASSIUM 50 MG/1
75 TABLET ORAL DAILY
Qty: 180 TABLET | Refills: 3 | Status: SHIPPED | OUTPATIENT
Start: 2021-05-19 | End: 2021-06-22 | Stop reason: SDUPTHER

## 2021-06-22 ENCOUNTER — OFFICE VISIT (OUTPATIENT)
Dept: FAMILY MEDICINE CLINIC | Age: 66
End: 2021-06-22
Payer: COMMERCIAL

## 2021-06-22 VITALS
TEMPERATURE: 97.6 F | HEART RATE: 65 BPM | RESPIRATION RATE: 16 BRPM | SYSTOLIC BLOOD PRESSURE: 110 MMHG | OXYGEN SATURATION: 98 % | BODY MASS INDEX: 36.71 KG/M2 | DIASTOLIC BLOOD PRESSURE: 70 MMHG | WEIGHT: 220.6 LBS

## 2021-06-22 DIAGNOSIS — R90.82 WHITE MATTER ABNORMALITY ON MRI OF BRAIN: ICD-10-CM

## 2021-06-22 DIAGNOSIS — E03.9 HYPOTHYROIDISM, UNSPECIFIED TYPE: ICD-10-CM

## 2021-06-22 DIAGNOSIS — Z99.89 OSA ON CPAP: ICD-10-CM

## 2021-06-22 DIAGNOSIS — G47.33 OSA ON CPAP: ICD-10-CM

## 2021-06-22 DIAGNOSIS — I10 ESSENTIAL HYPERTENSION: ICD-10-CM

## 2021-06-22 DIAGNOSIS — R51.9 NEW ONSET OF HEADACHES AFTER AGE 50: ICD-10-CM

## 2021-06-22 DIAGNOSIS — N18.30 STAGE 3 CHRONIC KIDNEY DISEASE, UNSPECIFIED WHETHER STAGE 3A OR 3B CKD (HCC): Primary | ICD-10-CM

## 2021-06-22 DIAGNOSIS — N18.30 STAGE 3 CHRONIC KIDNEY DISEASE, UNSPECIFIED WHETHER STAGE 3A OR 3B CKD (HCC): ICD-10-CM

## 2021-06-22 DIAGNOSIS — F41.9 ANXIETY: ICD-10-CM

## 2021-06-22 DIAGNOSIS — R00.2 PALPITATIONS: ICD-10-CM

## 2021-06-22 LAB
A/G RATIO: 1.6 (ref 1.1–2.2)
ALBUMIN SERPL-MCNC: 4.3 G/DL (ref 3.4–5)
ALP BLD-CCNC: 62 U/L (ref 40–129)
ALT SERPL-CCNC: 12 U/L (ref 10–40)
ANION GAP SERPL CALCULATED.3IONS-SCNC: 12 MMOL/L (ref 3–16)
AST SERPL-CCNC: 14 U/L (ref 15–37)
BASOPHILS ABSOLUTE: 0 K/UL (ref 0–0.2)
BASOPHILS RELATIVE PERCENT: 0.7 %
BILIRUB SERPL-MCNC: 0.5 MG/DL (ref 0–1)
BUN BLDV-MCNC: 22 MG/DL (ref 7–20)
CALCIUM SERPL-MCNC: 10 MG/DL (ref 8.3–10.6)
CHLORIDE BLD-SCNC: 100 MMOL/L (ref 99–110)
CHOLESTEROL, TOTAL: 196 MG/DL (ref 0–199)
CO2: 28 MMOL/L (ref 21–32)
CREAT SERPL-MCNC: 1.2 MG/DL (ref 0.6–1.2)
EOSINOPHILS ABSOLUTE: 0.1 K/UL (ref 0–0.6)
EOSINOPHILS RELATIVE PERCENT: 2.1 %
GFR AFRICAN AMERICAN: 54
GFR NON-AFRICAN AMERICAN: 45
GLOBULIN: 2.7 G/DL
GLUCOSE BLD-MCNC: 91 MG/DL (ref 70–99)
HCT VFR BLD CALC: 41.7 % (ref 36–48)
HDLC SERPL-MCNC: 52 MG/DL (ref 40–60)
HEMOGLOBIN: 14.3 G/DL (ref 12–16)
LDL CHOLESTEROL CALCULATED: 118 MG/DL
LYMPHOCYTES ABSOLUTE: 1.7 K/UL (ref 1–5.1)
LYMPHOCYTES RELATIVE PERCENT: 24.3 %
MCH RBC QN AUTO: 30.9 PG (ref 26–34)
MCHC RBC AUTO-ENTMCNC: 34.4 G/DL (ref 31–36)
MCV RBC AUTO: 89.8 FL (ref 80–100)
MONOCYTES ABSOLUTE: 0.3 K/UL (ref 0–1.3)
MONOCYTES RELATIVE PERCENT: 4.6 %
NEUTROPHILS ABSOLUTE: 4.6 K/UL (ref 1.7–7.7)
NEUTROPHILS RELATIVE PERCENT: 68.3 %
PDW BLD-RTO: 13.5 % (ref 12.4–15.4)
PLATELET # BLD: 184 K/UL (ref 135–450)
PMV BLD AUTO: 11.8 FL (ref 5–10.5)
POTASSIUM SERPL-SCNC: 3.8 MMOL/L (ref 3.5–5.1)
RBC # BLD: 4.64 M/UL (ref 4–5.2)
SODIUM BLD-SCNC: 140 MMOL/L (ref 136–145)
TOTAL PROTEIN: 7 G/DL (ref 6.4–8.2)
TRIGL SERPL-MCNC: 130 MG/DL (ref 0–150)
TSH REFLEX FT4: 3.27 UIU/ML (ref 0.27–4.2)
VLDLC SERPL CALC-MCNC: 26 MG/DL
WBC # BLD: 6.8 K/UL (ref 4–11)

## 2021-06-22 PROCEDURE — 99214 OFFICE O/P EST MOD 30 MIN: CPT | Performed by: PHYSICIAN ASSISTANT

## 2021-06-22 RX ORDER — LOSARTAN POTASSIUM 50 MG/1
75 TABLET ORAL DAILY
Qty: 180 TABLET | Refills: 3 | Status: SHIPPED | OUTPATIENT
Start: 2021-06-22 | End: 2021-08-30 | Stop reason: SINTOL

## 2021-06-22 RX ORDER — INDAPAMIDE 1.25 MG/1
TABLET, FILM COATED ORAL
Qty: 90 TABLET | Refills: 2 | Status: SHIPPED | OUTPATIENT
Start: 2021-06-22 | End: 2021-09-23

## 2021-06-22 RX ORDER — METOPROLOL TARTRATE 50 MG/1
TABLET, FILM COATED ORAL
Qty: 180 TABLET | Refills: 2 | Status: SHIPPED | OUTPATIENT
Start: 2021-06-22 | End: 2021-09-23

## 2021-06-22 ASSESSMENT — ENCOUNTER SYMPTOMS
WHEEZING: 0
PHOTOPHOBIA: 0
ABDOMINAL PAIN: 0
DIARRHEA: 0
SINUS PRESSURE: 0
CHEST TIGHTNESS: 0
BACK PAIN: 0
COUGH: 0
NAUSEA: 0
RHINORRHEA: 0
SINUS PAIN: 0
SORE THROAT: 0
SHORTNESS OF BREATH: 0
TROUBLE SWALLOWING: 0
BLOOD IN STOOL: 0
VOMITING: 0
EYE PAIN: 0
CONSTIPATION: 0

## 2021-06-22 ASSESSMENT — PATIENT HEALTH QUESTIONNAIRE - PHQ9
2. FEELING DOWN, DEPRESSED OR HOPELESS: 0
SUM OF ALL RESPONSES TO PHQ QUESTIONS 1-9: 0
SUM OF ALL RESPONSES TO PHQ9 QUESTIONS 1 & 2: 0
SUM OF ALL RESPONSES TO PHQ QUESTIONS 1-9: 0
1. LITTLE INTEREST OR PLEASURE IN DOING THINGS: 0
SUM OF ALL RESPONSES TO PHQ QUESTIONS 1-9: 0

## 2021-06-22 NOTE — PROGRESS NOTES
Jhony Lopez (:  1955) is a 72 y.o. female,Established patient, here for evaluation of the following chief complaint(s):    3 Month Follow-Up      SUBJECTIVE/OBJECTIVE:  HPI  Jhony Lopez is a pleasant 72 y.o. female presenting to clinic today for 3-month follow-up. NELSON on CPAP - compliant with CPAP; patient sees pulmonology Dr. Rain Light; last visit 2021. Got updated equipment and reports sleeping much better; patient reports she is having dreams now which she previously did not. Hypothyroidism - stable at 112 mcg; last TSH normal. No new symptoms to report. HTN - Metoprolol; has been good at home 130/80. The patient is taking hypertensive medications compliantly without side effects. Denies chest pain, dyspnea, edema, or TIA's. Headaches/Migraines - patient seen by neurology 2021; initiated low-dose tizanidine 4 mg at bedtime which did not help with headaches throughout the day. Advised against use of over-the-counter analgesics to prevent rebound headaches; taking ibuprofen 600 mg daily which has helped. Pt recently went to chiropractor which as helped immensely; xrays shows arthritis and DJD. In regards to her headaches, patient reports \"I feel 85-90% better. \"    Memory Impairment - seen by neuro 2021, indicated memory issues likely multifactorial related to untreated sleep apnea, chronic pain, anxiety. Neurologist states abnormal MRI likely related to untreated hypertension and obstructive sleep apnea; notes that MRI findings are not in a pattern of demyelinating disease or stroke. Upcoming follow-up with neurology 2021. \"I still have my moments but I am much better. \" Less stress surrounding diagnosis etc.     Palpitation - has not head to take CCB since last visit; no episodes of palpitations to report. Anxiety - seeing Dr Colette Wallace; next visit scheduled for 8/3/2021.  Greatly improved since last visit; mostly situational related to stress, health, neurological issues. R side Carpal Tunnel - EMG ordered by neuro scheduled for 7/12/2021. Pt reports occasional r hand numbness intermittently and tingling. Patient not interested in surgery etc.      Current Outpatient Medications   Medication Sig Dispense Refill    indapamide (LOZOL) 1.25 MG tablet TAKE 2 TABLETS BY MOUTH IN THE MORNING 90 tablet 2    losartan (COZAAR) 50 MG tablet Take 1.5 tablets by mouth daily 180 tablet 3    metoprolol tartrate (LOPRESSOR) 50 MG tablet Take 1 tablet by mouth twice daily 180 tablet 2    EUTHYROX 112 MCG tablet Take 1 tablet by mouth once daily 90 tablet 2    dilTIAZem (CARDIZEM) 30 MG tablet Take 1 tablet by mouth as needed (palpitation) 120 tablet 3    UNABLE TO FIND Home blood pressure monitor and cuff 1 Units 0     No current facility-administered medications for this visit. Review of Systems   Constitutional: Negative for activity change, appetite change, chills, diaphoresis, fever and unexpected weight change. HENT: Negative for congestion, dental problem, ear pain, hearing loss, mouth sores, nosebleeds, postnasal drip, rhinorrhea, sinus pressure, sinus pain, sore throat, tinnitus and trouble swallowing. Eyes: Negative for photophobia, pain and visual disturbance. Respiratory: Negative for cough, chest tightness, shortness of breath and wheezing. Cardiovascular: Negative for chest pain, palpitations and leg swelling. Gastrointestinal: Negative for abdominal pain, blood in stool, constipation, diarrhea, nausea and vomiting. Endocrine: Negative for cold intolerance, heat intolerance, polydipsia and polyuria. Genitourinary: Negative for difficulty urinating, dysuria, flank pain, frequency, hematuria and urgency. Musculoskeletal: Negative for arthralgias, back pain, gait problem, joint swelling, myalgias, neck pain and neck stiffness. Skin: Negative for pallor, rash and wound.    Allergic/Immunologic: Negative for environmental allergies, food allergies and immunocompromised state. Neurological: Negative for dizziness, syncope, weakness, light-headedness, numbness and headaches. Short-term memory recall impairment   Hematological: Negative for adenopathy. Does not bruise/bleed easily. Psychiatric/Behavioral: Negative for confusion, decreased concentration, dysphoric mood, self-injury, sleep disturbance and suicidal ideas. The patient is not nervous/anxious. Physical Exam  Vitals and nursing note reviewed. Constitutional:       General: She is not in acute distress. Appearance: Normal appearance. She is obese. She is not ill-appearing, toxic-appearing or diaphoretic. HENT:      Head: Normocephalic and atraumatic. Nose: Nose normal. No congestion or rhinorrhea. Mouth/Throat:      Mouth: Mucous membranes are moist.   Eyes:      Extraocular Movements: Extraocular movements intact. Conjunctiva/sclera: Conjunctivae normal.      Pupils: Pupils are equal, round, and reactive to light. Cardiovascular:      Rate and Rhythm: Normal rate and regular rhythm. Pulses: Normal pulses. Heart sounds: Normal heart sounds. Pulmonary:      Effort: Pulmonary effort is normal. No respiratory distress. Breath sounds: Normal breath sounds. No stridor. No wheezing, rhonchi or rales. Chest:      Chest wall: No tenderness. Musculoskeletal:         General: No swelling or tenderness. Normal range of motion. Cervical back: Normal range of motion and neck supple. No rigidity. No muscular tenderness. Right lower leg: No edema. Left lower leg: No edema. Lymphadenopathy:      Cervical: No cervical adenopathy. Skin:     General: Skin is warm and dry. Capillary Refill: Capillary refill takes less than 2 seconds. Coloration: Skin is not pale. Findings: No bruising, erythema, lesion or rash. Neurological:      General: No focal deficit present.       Mental Status: She is alert and oriented to person, place, and time. Mental status is at baseline. Motor: No weakness. Coordination: Coordination normal.      Gait: Gait normal.   Psychiatric:         Mood and Affect: Mood normal.         Behavior: Behavior normal.         Thought Content: Thought content normal.         Judgment: Judgment normal.         ASSESSMENT/PLAN:  1. Stage 3 chronic kidney disease, unspecified whether stage 3a or 3b CKD ([facility] [address]   -Patient's most recent CMP showed slight decline in GFR; will recheck today. Pending results, may need to adjust medications etc.  -     CBC Auto Differential; Future  -     Comprehensive Metabolic Panel; Future  -     Lipid Panel; Future  2. Essential hypertension   -Well-controlled today in clinic; can continue with current medication regimen. Advised patient to routinely check blood pressures at home and to maintain a log. The patient is taking hypertensive medications compliantly without side effects. Denies chest pain, dyspnea, edema, or TIA's.    -     CBC Auto Differential; Future  -     Comprehensive Metabolic Panel; Future  -     Lipid Panel; Future  -     TSH WITH REFLEX TO FT4; Future  -     indapamide (LOZOL) 1.25 MG tablet; TAKE 2 TABLETS BY MOUTH IN THE MORNING, Disp-90 tablet, R-2Normal  -     losartan (COZAAR) 50 MG tablet; Take 1.5 tablets by mouth daily, Disp-180 tablet, R-3Normal  -     metoprolol tartrate (LOPRESSOR) 50 MG tablet; Take 1 tablet by mouth twice daily, Disp-180 tablet, R-2Normal  3. New onset of headaches after age 48   -Patient evaluated by neurology; patient's headaches likely multifactorial related to NELSON, hypertension, anxiety, neck arthritis etc. Patient does report that her headaches have improved since last visit with initiation of chiropractor therapy. -     CBC Auto Differential; Future  -     Comprehensive Metabolic Panel; Future  4. Hypothyroidism, unspecified type   -Recheck labs.  Continue current medication regimen and can consider dose adjustments pending lab results. -     CBC Auto Differential; Future  -     Comprehensive Metabolic Panel; Future  -     TSH WITH REFLEX TO FT4; Future  5. Anxiety   -Continue with lifestyle interventions and follow-up with Dr. Iris Bradford. 6. NELSON on CPAP   -Patient compliant with CPAP. Continue CPAP. 7. Palpitations  -     metoprolol tartrate (LOPRESSOR) 50 MG tablet; Take 1 tablet by mouth twice daily, Disp-180 tablet, R-2Normal  8. White matter abnormality on MRI of brain   -As noted by neurology, likely normal/related to untreated NELSON/hypertension. Return in about 3 months (around 9/22/2021), or if symptoms worsen or fail to improve, for Follow Up. An electronic signature was used to authenticate this note.     --PAULINA Eller

## 2021-08-02 PROBLEM — N18.31 STAGE 3A CHRONIC KIDNEY DISEASE (HCC): Status: ACTIVE | Noted: 2021-08-02

## 2021-08-05 ENCOUNTER — HOSPITAL ENCOUNTER (OUTPATIENT)
Dept: ULTRASOUND IMAGING | Age: 66
Discharge: HOME | End: 2021-08-05
Payer: COMMERCIAL

## 2021-08-05 ENCOUNTER — HOSPITAL ENCOUNTER (OUTPATIENT)
Age: 66
Discharge: HOME | End: 2021-08-05
Payer: COMMERCIAL

## 2021-08-05 DIAGNOSIS — N18.31 STAGE 3A CHRONIC KIDNEY DISEASE (HCC): ICD-10-CM

## 2021-08-05 LAB
BACTERIA: NEGATIVE /HPF
BILIRUBIN URINE: NEGATIVE MG/DL
BLOOD, URINE: ABNORMAL
CAST TYPE: ABNORMAL /HPF
CLARITY: CLEAR
COLOR: YELLOW
CREATININE URINE: 74.5 MG/DL (ref 28–217)
CRYSTAL TYPE: NEGATIVE /HPF
EPITHELIAL CELLS, UA: 10 /HPF
GLUCOSE, URINE: NEGATIVE MG/DL
KETONES, URINE: NEGATIVE MG/DL
LEUKOCYTE ESTERASE, URINE: ABNORMAL
NITRITE URINE, QUANTITATIVE: NEGATIVE
PH, URINE: 7 (ref 5–8)
PROT/CREAT RATIO, UR: 0.1
PROTEIN UA: NEGATIVE MG/DL
RBC URINE: 1 /HPF (ref 0–6)
SPECIFIC GRAVITY UA: 1.01 (ref 1–1.03)
URINE TOTAL PROTEIN: 8.9 MG/DL
UROBILINOGEN, URINE: 0.2 MG/DL (ref 0.2–1)
WBC UA: 4 /HPF (ref 0–5)

## 2021-08-05 PROCEDURE — 84156 ASSAY OF PROTEIN URINE: CPT

## 2021-08-05 PROCEDURE — 82570 ASSAY OF URINE CREATININE: CPT

## 2021-08-05 PROCEDURE — 76775 US EXAM ABDO BACK WALL LIM: CPT

## 2021-08-05 PROCEDURE — 81001 URINALYSIS AUTO W/SCOPE: CPT

## 2021-08-27 ENCOUNTER — HOSPITAL ENCOUNTER (OUTPATIENT)
Age: 66
Discharge: HOME | End: 2021-08-27
Payer: COMMERCIAL

## 2021-08-27 LAB
ALBUMIN SERPL-MCNC: 4.1 GM/DL (ref 3.4–5)
ALP BLD-CCNC: 74 IU/L (ref 40–129)
ALT SERPL-CCNC: 19 U/L (ref 10–40)
ANION GAP SERPL CALCULATED.3IONS-SCNC: 10 MMOL/L (ref 4–16)
AST SERPL-CCNC: 18 IU/L (ref 15–37)
BILIRUB SERPL-MCNC: 0.5 MG/DL (ref 0–1)
BUN BLDV-MCNC: 23 MG/DL (ref 6–23)
CALCIUM SERPL-MCNC: 9.8 MG/DL (ref 8.3–10.6)
CHLORIDE BLD-SCNC: 99 MMOL/L (ref 99–110)
CO2: 26 MMOL/L (ref 21–32)
CREAT SERPL-MCNC: 1.3 MG/DL (ref 0.6–1.1)
GFR AFRICAN AMERICAN: 50 ML/MIN/1.73M2
GFR NON-AFRICAN AMERICAN: 41 ML/MIN/1.73M2
GLUCOSE FASTING: 111 MG/DL (ref 70–99)
POTASSIUM SERPL-SCNC: 3.6 MMOL/L (ref 3.5–5.1)
SODIUM BLD-SCNC: 135 MMOL/L (ref 135–145)
TOTAL PROTEIN: 7 GM/DL (ref 6.4–8.2)

## 2021-08-27 PROCEDURE — 80053 COMPREHEN METABOLIC PANEL: CPT

## 2021-08-27 PROCEDURE — 36415 COLL VENOUS BLD VENIPUNCTURE: CPT

## 2021-08-30 PROBLEM — N17.9 ACUTE KIDNEY FAILURE, UNSPECIFIED (HCC): Status: ACTIVE | Noted: 2021-08-30

## 2021-09-21 ENCOUNTER — HOSPITAL ENCOUNTER (OUTPATIENT)
Age: 66
Discharge: HOME | End: 2021-09-21
Payer: COMMERCIAL

## 2021-09-21 LAB
ANION GAP SERPL CALCULATED.3IONS-SCNC: 12 MMOL/L (ref 4–16)
BUN BLDV-MCNC: 27 MG/DL (ref 6–23)
CALCIUM SERPL-MCNC: 9.7 MG/DL (ref 8.3–10.6)
CHLORIDE BLD-SCNC: 99 MMOL/L (ref 99–110)
CO2: 25 MMOL/L (ref 21–32)
CREAT SERPL-MCNC: 1.2 MG/DL (ref 0.6–1.1)
GFR AFRICAN AMERICAN: 54 ML/MIN/1.73M2
GFR NON-AFRICAN AMERICAN: 45 ML/MIN/1.73M2
GLUCOSE BLD-MCNC: 105 MG/DL (ref 70–99)
POTASSIUM SERPL-SCNC: 3.7 MMOL/L (ref 3.5–5.1)
SODIUM BLD-SCNC: 136 MMOL/L (ref 135–145)

## 2021-09-21 PROCEDURE — 80048 BASIC METABOLIC PNL TOTAL CA: CPT

## 2021-09-21 PROCEDURE — 36415 COLL VENOUS BLD VENIPUNCTURE: CPT

## 2021-10-01 ENCOUNTER — OFFICE VISIT (OUTPATIENT)
Dept: FAMILY MEDICINE CLINIC | Age: 66
End: 2021-10-01
Payer: COMMERCIAL

## 2021-10-01 VITALS
BODY MASS INDEX: 39 KG/M2 | SYSTOLIC BLOOD PRESSURE: 122 MMHG | RESPIRATION RATE: 16 BRPM | TEMPERATURE: 97.3 F | HEART RATE: 59 BPM | OXYGEN SATURATION: 98 % | DIASTOLIC BLOOD PRESSURE: 70 MMHG | WEIGHT: 227.2 LBS

## 2021-10-01 DIAGNOSIS — G47.33 OSA ON CPAP: ICD-10-CM

## 2021-10-01 DIAGNOSIS — F41.9 ANXIETY: ICD-10-CM

## 2021-10-01 DIAGNOSIS — Z99.89 OSA ON CPAP: ICD-10-CM

## 2021-10-01 DIAGNOSIS — E03.9 HYPOTHYROIDISM, UNSPECIFIED TYPE: ICD-10-CM

## 2021-10-01 DIAGNOSIS — I10 ESSENTIAL HYPERTENSION: ICD-10-CM

## 2021-10-01 DIAGNOSIS — E66.9 OBESITY, UNSPECIFIED CLASSIFICATION, UNSPECIFIED OBESITY TYPE, UNSPECIFIED WHETHER SERIOUS COMORBIDITY PRESENT: Primary | ICD-10-CM

## 2021-10-01 DIAGNOSIS — N18.30 STAGE 3 CHRONIC KIDNEY DISEASE, UNSPECIFIED WHETHER STAGE 3A OR 3B CKD (HCC): ICD-10-CM

## 2021-10-01 DIAGNOSIS — Z23 NEED FOR PNEUMOCOCCAL VACCINATION: ICD-10-CM

## 2021-10-01 DIAGNOSIS — Z23 NEED FOR SHINGLES VACCINE: ICD-10-CM

## 2021-10-01 PROCEDURE — 90732 PPSV23 VACC 2 YRS+ SUBQ/IM: CPT | Performed by: PHYSICIAN ASSISTANT

## 2021-10-01 PROCEDURE — 99214 OFFICE O/P EST MOD 30 MIN: CPT | Performed by: PHYSICIAN ASSISTANT

## 2021-10-01 PROCEDURE — 90471 IMMUNIZATION ADMIN: CPT | Performed by: PHYSICIAN ASSISTANT

## 2021-10-01 RX ORDER — ZOSTER VACCINE RECOMBINANT, ADJUVANTED 50 MCG/0.5
0.5 KIT INTRAMUSCULAR SEE ADMIN INSTRUCTIONS
Qty: 0.5 ML | Refills: 0 | Status: SHIPPED | OUTPATIENT
Start: 2021-10-01 | End: 2022-03-30

## 2021-10-01 ASSESSMENT — ENCOUNTER SYMPTOMS
RHINORRHEA: 0
SORE THROAT: 0
CHEST TIGHTNESS: 0
ABDOMINAL PAIN: 0
BLOOD IN STOOL: 0
SHORTNESS OF BREATH: 0
SINUS PAIN: 0
EYE PAIN: 0
DIARRHEA: 0
VOMITING: 0
SINUS PRESSURE: 0
TROUBLE SWALLOWING: 0
PHOTOPHOBIA: 0
BACK PAIN: 0
COUGH: 0
WHEEZING: 0
CONSTIPATION: 0
NAUSEA: 0

## 2021-10-01 ASSESSMENT — PATIENT HEALTH QUESTIONNAIRE - PHQ9
SUM OF ALL RESPONSES TO PHQ QUESTIONS 1-9: 0
SUM OF ALL RESPONSES TO PHQ QUESTIONS 1-9: 0
1. LITTLE INTEREST OR PLEASURE IN DOING THINGS: 0
SUM OF ALL RESPONSES TO PHQ9 QUESTIONS 1 & 2: 0
2. FEELING DOWN, DEPRESSED OR HOPELESS: 0
SUM OF ALL RESPONSES TO PHQ QUESTIONS 1-9: 0

## 2021-10-01 NOTE — PROGRESS NOTES
Jose G Merida (:  1955) is a 77 y.o. female,Established patient, here for evaluation of the following chief complaint(s):    Follow-up (patient is here for 3 month follow up )      SUBJECTIVE/OBJECTIVE:  SHANTAL Merida is a pleasant 77 y.o. female presenting to clinic today for routine follow-up. CKD/HTN -patient established with nephrology; recent visit on 2021; patient now off losartan and taking indapamide 2.5 and metoprolol 75 twice a day for hypertension. Last BMP performed 2021 with estimated GFR of 45, stable. Patient does think that she may be slightly dehydrated, reports that she does not drink enough water. NELSON on CPAP -compliant. Headaches -improved with chiropractor, going once monthly. Likely multifactorial in origin including hypertension, anxiety, NELSON, neck arthritis etc. Tylenol prn, tolerable at this time. Anxiety - doing well; normal stress. Memory impairment - doing well; looking to retire. Less stress has improved. Weight Gain - almost 20 lbs in past 6 months; patient reports that for the most part, she eats healthy foods however does have issues with portion control and stress eating. Patient is motivated to lose weight, weight loss goal with next 3 months for 20 pounds.        Current Outpatient Medications   Medication Sig Dispense Refill    zoster recombinant adjuvanted vaccine (SHINGRIX) 50 MCG/0.5ML SUSR injection Inject 0.5 mLs into the muscle See Admin Instructions 1 dose now and repeat in 2-6 months 0.5 mL 0    indapamide (LOZOL) 2.5 MG tablet TAKE 1 TAB BY MOUTH IN THE MORNING 90 tablet 3    metoprolol tartrate (LOPRESSOR) 50 MG tablet Take 1.5 tablet by mouth twice daily 270 tablet 3    EUTHYROX 112 MCG tablet Take 1 tablet by mouth once daily 90 tablet 2    dilTIAZem (CARDIZEM) 30 MG tablet Take 1 tablet by mouth as needed (palpitation) (Patient taking differently: Take 30 mg by mouth as needed (palpitation) prn) 120 tablet 3 No current facility-administered medications for this visit. Review of Systems   Constitutional: Negative for activity change, appetite change, chills, diaphoresis, fever and unexpected weight change. HENT: Negative for congestion, dental problem, ear pain, hearing loss, mouth sores, nosebleeds, postnasal drip, rhinorrhea, sinus pressure, sinus pain, sore throat, tinnitus and trouble swallowing. Eyes: Negative for photophobia, pain and visual disturbance. Respiratory: Negative for cough, chest tightness, shortness of breath and wheezing. Cardiovascular: Negative for chest pain, palpitations and leg swelling. Gastrointestinal: Negative for abdominal pain, blood in stool, constipation, diarrhea, nausea and vomiting. Endocrine: Negative for cold intolerance, heat intolerance, polydipsia and polyuria. Genitourinary: Negative for difficulty urinating, dysuria, flank pain, frequency, hematuria and urgency. Musculoskeletal: Negative for arthralgias, back pain, gait problem, joint swelling, myalgias, neck pain and neck stiffness. Skin: Negative for pallor, rash and wound. Allergic/Immunologic: Negative for environmental allergies, food allergies and immunocompromised state. Neurological: Negative for dizziness, syncope, weakness, light-headedness, numbness and headaches. Hematological: Negative for adenopathy. Does not bruise/bleed easily. Psychiatric/Behavioral: Negative for confusion, decreased concentration, dysphoric mood, self-injury, sleep disturbance and suicidal ideas. The patient is not nervous/anxious. Physical Exam  Vitals and nursing note reviewed. Constitutional:       General: She is not in acute distress. Appearance: Normal appearance. She is obese. She is not ill-appearing, toxic-appearing or diaphoretic. HENT:      Head: Normocephalic and atraumatic. Nose: Nose normal. No congestion or rhinorrhea.       Mouth/Throat:      Mouth: Mucous membranes are moist.   Eyes:      Extraocular Movements: Extraocular movements intact. Conjunctiva/sclera: Conjunctivae normal.      Pupils: Pupils are equal, round, and reactive to light. Cardiovascular:      Rate and Rhythm: Normal rate and regular rhythm. Pulses: Normal pulses. Heart sounds: Normal heart sounds. Pulmonary:      Effort: Pulmonary effort is normal. No respiratory distress. Breath sounds: Normal breath sounds. No stridor. No wheezing, rhonchi or rales. Chest:      Chest wall: No tenderness. Musculoskeletal:         General: No swelling or tenderness. Normal range of motion. Cervical back: Normal range of motion and neck supple. No rigidity. No muscular tenderness. Right lower leg: No edema. Left lower leg: No edema. Lymphadenopathy:      Cervical: No cervical adenopathy. Skin:     General: Skin is warm and dry. Capillary Refill: Capillary refill takes less than 2 seconds. Coloration: Skin is not pale. Findings: No bruising, erythema, lesion or rash. Neurological:      General: No focal deficit present. Mental Status: She is alert and oriented to person, place, and time. Mental status is at baseline. Motor: No weakness. Coordination: Coordination normal.      Gait: Gait normal.   Psychiatric:         Mood and Affect: Mood normal.         Behavior: Behavior normal.         Thought Content: Thought content normal.         Judgment: Judgment normal.         ASSESSMENT/PLAN:  1. Obesity, unspecified classification, unspecified obesity type, unspecified whether serious comorbidity present   -Last TSH check was normal; patient would prefer not have blood drawn today. Will check at next appointment. Discussed various lifestyle practices for weight loss including diet and exercise etc.  Patient is not interested in referrals for bariatric clinic or nutritionist.  Patient would like to lose 20 pounds over the next 3 months.   Follow-up in 3 months for reassessment. The patient is asked to make an attempt to improve diet and exercise patterns to aid in medical management of this problem. 2. Essential hypertension   -Blood pressure is well controlled today in clinic; continue with blood pressure medication regimen as outlined by nephrology. 3. Stage 3 chronic kidney disease, unspecified whether stage 3a or 3b CKD ([facility] [address]   -Patient has ongoing appointment scheduled with nephrology; estimated GFR has been stable. Continue with medications, recommend increase fluid intake. 4. Anxiety   -Manageable at this time, no concerns. 5. NELSON on CPAP   -Continue CPAP  6. Hypothyroidism, unspecified type   -Last TSH check normal; will check next visit. 7. Need for pneumococcal vaccination   -Discussed possible side effects, risks and benefits etc. patient agrees to Pneumovax 23.  -     Pneumococcal polysaccharide vaccine 23-valent greater than or equal to 1yo subcutaneous/IM  8. Need for shingles vaccine   -Can discuss with pharmacist.  -     zoster recombinant adjuvanted vaccine [facility]) 50 MCG/0.5ML SUSR injection; Inject 0.5 mLs into the muscle See Admin Instructions 1 dose now and repeat in 2-6 months, Disp-0.5 mL, R-0Print      Return in about 3 months (around 1/1/2022), or if symptoms worsen or fail to improve, for Follow Up. An electronic signature was used to authenticate this note.     --PAULINA Dietrich

## 2021-12-08 DIAGNOSIS — R00.2 PALPITATIONS: ICD-10-CM

## 2021-12-08 DIAGNOSIS — I10 ESSENTIAL HYPERTENSION: ICD-10-CM

## 2021-12-08 RX ORDER — METOPROLOL TARTRATE 50 MG/1
TABLET, FILM COATED ORAL
Qty: 180 TABLET | Refills: 0 | Status: SHIPPED | OUTPATIENT
Start: 2021-12-08 | End: 2022-01-31 | Stop reason: ALTCHOICE

## 2022-01-20 ENCOUNTER — HOSPITAL ENCOUNTER (OUTPATIENT)
Age: 67
Discharge: HOME | End: 2022-01-20
Payer: COMMERCIAL

## 2022-01-20 LAB
ALBUMIN SERPL-MCNC: 4.3 GM/DL (ref 3.4–5)
ANION GAP SERPL CALCULATED.3IONS-SCNC: 7 MMOL/L (ref 4–16)
BUN BLDV-MCNC: 23 MG/DL (ref 6–23)
CALCIUM SERPL-MCNC: 9.4 MG/DL (ref 8.3–10.6)
CHLORIDE BLD-SCNC: 95 MMOL/L (ref 99–110)
CO2: 31 MMOL/L (ref 21–32)
CREAT SERPL-MCNC: 1.2 MG/DL (ref 0.6–1.1)
GFR AFRICAN AMERICAN: 54 ML/MIN/1.73M2
GFR NON-AFRICAN AMERICAN: 45 ML/MIN/1.73M2
GLUCOSE BLD-MCNC: 77 MG/DL (ref 70–99)
PHOSPHORUS: 3 MG/DL (ref 2.5–4.9)
POTASSIUM SERPL-SCNC: 3.5 MMOL/L (ref 3.5–5.1)
SODIUM BLD-SCNC: 133 MMOL/L (ref 135–145)

## 2022-01-20 PROCEDURE — 80069 RENAL FUNCTION PANEL: CPT

## 2022-01-20 PROCEDURE — 36415 COLL VENOUS BLD VENIPUNCTURE: CPT

## 2022-01-21 NOTE — RESULT ENCOUNTER NOTE
Yulia:    Veronica Mrs. Huynh Para,    Your kidney function panel is consistent with previous; no changes in estimated kidney function values etc.; minor electrolyte abnormalities; can discuss further at follow-up and at your appointment with your kidney doctor etc.    Please let me know if you have any other questions or concerns.   Thanks,  [place] Hong

## 2022-01-24 ENCOUNTER — TELEPHONE (OUTPATIENT)
Dept: FAMILY MEDICINE CLINIC | Age: 67
End: 2022-01-24

## 2022-01-24 NOTE — TELEPHONE ENCOUNTER
I called and rescheduled patient for 1/31/22  ----- Message from Marmariella April sent at 1/24/2022  7:07 AM EST -----  Subject: Appointment Request    Reason for Call: Routine Existing Condition Follow Up    QUESTIONS  Type of Appointment? Established Patient  Reason for appointment request? Other - Could not be scheduled by [facility] ([facility])  Additional Information for Provider? PT called in to reschedule   appointment due to feeling well.  ---------------------------------------------------------------------------  --------------  CALL BACK INFO  What is the best way for the office to contact you? OK to leave message on   voicemail  Preferred Call Back Phone Number? [phone]  ---------------------------------------------------------------------------  --------------  SCRIPT ANSWERS  Relationship to Patient? Self  Have your symptoms changed? No  Is this follow up request related to routine Diabetes Management? No  Have you been diagnosed with, awaiting test results for, or told that you   are suspected of having COVID-19 (Coronavirus)? (If patient has tested   negative or was tested as a requirement for work, school, or travel and   not based on symptoms, answer no)? No  Within the past two weeks have you developed any of the following symptoms   (answer no if symptoms have been present longer than 2 weeks or began   more than 2 weeks ago)? Fever or Chills, Cough, Shortness of breath or   difficulty breathing, Loss of taste or smell, Sore throat, Nasal   congestion, Sneezing or runny nose, Fatigue or generalized body aches   (answer no if pain is specific to a body part e.g. back pain), Diarrhea,   Headache?  Yes

## 2022-01-31 ENCOUNTER — OFFICE VISIT (OUTPATIENT)
Dept: FAMILY MEDICINE CLINIC | Age: 67
End: 2022-01-31
Payer: COMMERCIAL

## 2022-01-31 VITALS
TEMPERATURE: 97 F | SYSTOLIC BLOOD PRESSURE: 138 MMHG | WEIGHT: 234 LBS | OXYGEN SATURATION: 98 % | BODY MASS INDEX: 40.17 KG/M2 | RESPIRATION RATE: 18 BRPM | HEART RATE: 57 BPM | DIASTOLIC BLOOD PRESSURE: 84 MMHG

## 2022-01-31 DIAGNOSIS — I10 ESSENTIAL HYPERTENSION: ICD-10-CM

## 2022-01-31 DIAGNOSIS — E66.01 OBESITY, CLASS III, BMI 40-49.9 (MORBID OBESITY) (HCC): ICD-10-CM

## 2022-01-31 DIAGNOSIS — E66.9 OBESITY, UNSPECIFIED CLASSIFICATION, UNSPECIFIED OBESITY TYPE, UNSPECIFIED WHETHER SERIOUS COMORBIDITY PRESENT: ICD-10-CM

## 2022-01-31 DIAGNOSIS — N18.30 STAGE 3 CHRONIC KIDNEY DISEASE, UNSPECIFIED WHETHER STAGE 3A OR 3B CKD (HCC): ICD-10-CM

## 2022-01-31 DIAGNOSIS — R53.83 OTHER FATIGUE: Primary | ICD-10-CM

## 2022-01-31 DIAGNOSIS — R00.2 PALPITATIONS: ICD-10-CM

## 2022-01-31 DIAGNOSIS — Z13.1 DIABETES MELLITUS SCREENING: ICD-10-CM

## 2022-01-31 PROCEDURE — 99214 OFFICE O/P EST MOD 30 MIN: CPT | Performed by: PHYSICIAN ASSISTANT

## 2022-01-31 RX ORDER — METOPROLOL TARTRATE 50 MG/1
TABLET, FILM COATED ORAL
Qty: 180 TABLET | Refills: 0 | Status: SHIPPED | OUTPATIENT
Start: 2022-01-31 | End: 2022-04-14

## 2022-01-31 ASSESSMENT — PATIENT HEALTH QUESTIONNAIRE - PHQ9
SUM OF ALL RESPONSES TO PHQ QUESTIONS 1-9: 0
2. FEELING DOWN, DEPRESSED OR HOPELESS: 0
SUM OF ALL RESPONSES TO PHQ QUESTIONS 1-9: 0
1. LITTLE INTEREST OR PLEASURE IN DOING THINGS: 0
SUM OF ALL RESPONSES TO PHQ9 QUESTIONS 1 & 2: 0

## 2022-01-31 ASSESSMENT — ENCOUNTER SYMPTOMS
PHOTOPHOBIA: 0
COLOR CHANGE: 0
WHEEZING: 0
SORE THROAT: 0
BACK PAIN: 0
DIARRHEA: 0
NAUSEA: 0
CONSTIPATION: 0
COUGH: 0
EYE PAIN: 0
VOMITING: 0
EYE REDNESS: 0
EYE DISCHARGE: 0
CHEST TIGHTNESS: 0
SHORTNESS OF BREATH: 0
BLOOD IN STOOL: 0
RHINORRHEA: 0
ABDOMINAL PAIN: 0

## 2022-01-31 NOTE — PROGRESS NOTES
Rocky Valladares (:  1955) is a 77 y.o. female,Established patient, here for evaluation of the following chief complaint(s):    3 Month Follow-Up and Results (discuss recent blood work)      SUBJECTIVE/OBJECTIVE:  HPI  Rocky Valladares is a pleasant 77 y.o. female presenting to clinic today for f/u. Obesity -BMI 40.17 kg/m²    CKD -kidney function panel checked 2022 stable; patient has follow-up with nephrology scheduled 3/16/2022. HTN -patient reports checking blood pressures at home regularly and reports that they are typically well controlled; around typically 120-130/80; The patient is taking hypertensive medications compliantly without side effects. Denies chest pain, dyspnea, edema, or TIA's. Hypothyroidism -last TSH check normal, patient compliant with thyroid replacement 112 mcg daily. Patient does report ongoing fatigue, lack of some motivation, denies anhedonia or other depressive symptoms, reports that she feels mostly more tired around midmorning. Palpitations -reports only 1 episode of palpitations in the past several months; reports this occurred after eating greasy hamburgers with associated GERD, reports taking Cardizem and peppermint with relief of symptoms.       Current Outpatient Medications   Medication Sig Dispense Refill    metoprolol tartrate (LOPRESSOR) 50 MG tablet Take 1.5 tablet by mouth twice daily 180 tablet 0    zoster recombinant adjuvanted vaccine (SHINGRIX) 50 MCG/0.5ML SUSR injection Inject 0.5 mLs into the muscle See Admin Instructions 1 dose now and repeat in 2-6 months 0.5 mL 0    indapamide (LOZOL) 2.5 MG tablet TAKE 1 TAB BY MOUTH IN THE MORNING 90 tablet 3    EUTHYROX 112 MCG tablet Take 1 tablet by mouth once daily 90 tablet 2    dilTIAZem (CARDIZEM) 30 MG tablet Take 1 tablet by mouth as needed (palpitation) (Patient taking differently: Take 30 mg by mouth as needed (palpitation) prn) 120 tablet 3     No current facility-administered medications for this visit. Review of Systems   Constitutional: Positive for fatigue. Negative for appetite change, chills and fever. HENT: Negative for congestion, ear pain, hearing loss, rhinorrhea and sore throat. Eyes: Negative for photophobia, pain, discharge and redness. Respiratory: Negative for cough, chest tightness, shortness of breath and wheezing. Cardiovascular: Negative for chest pain, palpitations and leg swelling. Gastrointestinal: Negative for abdominal pain, blood in stool, constipation, diarrhea, nausea and vomiting. Endocrine: Negative for polyuria. Genitourinary: Negative for difficulty urinating, dysuria, flank pain, frequency, hematuria and urgency. Musculoskeletal: Negative for arthralgias, back pain, gait problem and joint swelling. Skin: Negative for color change and rash. Neurological: Negative for dizziness, syncope, weakness, light-headedness and headaches. Hematological: Negative for adenopathy. Psychiatric/Behavioral: Negative for agitation, behavioral problems and suicidal ideas. The patient is not nervous/anxious. Physical Exam  Constitutional:       Appearance: She is obese. HENT:      Head: Normocephalic and atraumatic. Right Ear: External ear normal.      Left Ear: External ear normal.   Cardiovascular:      Rate and Rhythm: Regular rhythm. Pulses: Normal pulses. Pulmonary:      Effort: No respiratory distress. Musculoskeletal:         General: Normal range of motion. Skin:     General: Skin is warm and dry. Neurological:      General: No focal deficit present. Mental Status: She is alert and oriented to person, place, and time. Mental status is at baseline. Psychiatric:         Behavior: Behavior normal.         ASSESSMENT/PLAN:  1. Other fatigue   -Likely related to lifestyle, seasonal changes etc.; patient denies other depressive symptoms or diabetes symptoms however will check baseline labs.   Last kidney function check showed stable creatinine.  -     TSH WITH REFLEX TO FT4; Future  -     HEMOGLOBIN A1C; Future  -     CBC WITH AUTO DIFFERENTIAL; Future  2. Essential hypertension   -The patient is taking hypertensive medications compliantly without side effects. Denies chest pain, dyspnea, edema, or TIA's. Continue with medications as prescribed by nephrology, metoprolol and indapamide.  -     metoprolol tartrate (LOPRESSOR) 50 MG tablet; Take 1.5 tablet by mouth twice daily, Disp-180 tablet, R-0Normal  3. Palpitations   -Reports only 1 episode in the past several months, patient reports it was possibly related to acid reflux/GERD and did resolve. -     metoprolol tartrate (LOPRESSOR) 50 MG tablet; Take 1.5 tablet by mouth twice daily, Disp-180 tablet, R-0Normal  4. Obesity, unspecified classification, unspecified obesity type, unspecified whether serious comorbidity present   -The patient is asked to make an attempt to improve diet and exercise patterns to aid in medical management of this problem. -     TSH WITH REFLEX TO FT4; Future  -     HEMOGLOBIN A1C; Future  -     CBC WITH AUTO DIFFERENTIAL; Future  5. Stage 3 chronic kidney disease, unspecified whether stage 3a or 3b CKD ([facility] [address]   -Follow-up with nephrologist in March. Continue current medication regimen; discussed importance of maintaining adequate hydration, DASH diet, decreasing risk of diabetes and uncontrolled hypertension etc.  6. Diabetes mellitus screening  -     HEMOGLOBIN A1C; Future      Return in about 6 months (around 7/31/2022), or if symptoms worsen or fail to improve, for Follow Up. An electronic signature was used to authenticate this note.     --PAULINA Ray

## 2022-02-11 ENCOUNTER — HOSPITAL ENCOUNTER (OUTPATIENT)
Age: 67
Discharge: HOME | End: 2022-02-11
Payer: COMMERCIAL

## 2022-02-11 LAB
BASOPHILS ABSOLUTE: 0 K/CU MM
BASOPHILS RELATIVE PERCENT: 0.5 % (ref 0–1)
DIFFERENTIAL TYPE: ABNORMAL
EOSINOPHILS ABSOLUTE: 0.1 K/CU MM
EOSINOPHILS RELATIVE PERCENT: 1.3 % (ref 0–3)
ESTIMATED AVERAGE GLUCOSE: 120 MG/DL
HBA1C MFR BLD: 5.8 % (ref 4.2–6.3)
HCT VFR BLD CALC: 41.9 % (ref 37–47)
HEMOGLOBIN: 14.6 GM/DL (ref 12.5–16)
IMMATURE NEUTROPHIL %: 0.2 % (ref 0–0.43)
LYMPHOCYTES ABSOLUTE: 2.1 K/CU MM
LYMPHOCYTES RELATIVE PERCENT: 23.7 % (ref 24–44)
MCH RBC QN AUTO: 30.8 PG (ref 27–31)
MCHC RBC AUTO-ENTMCNC: 34.8 % (ref 32–36)
MCV RBC AUTO: 88.4 FL (ref 78–100)
MONOCYTES ABSOLUTE: 0.4 K/CU MM
MONOCYTES RELATIVE PERCENT: 5.1 % (ref 0–4)
PDW BLD-RTO: 13.2 % (ref 11.7–14.9)
PLATELET # BLD: 237 K/CU MM (ref 140–440)
PMV BLD AUTO: 11.4 FL (ref 7.5–11.1)
RBC # BLD: 4.74 M/CU MM (ref 4.2–5.4)
SEGMENTED NEUTROPHILS ABSOLUTE COUNT: 6 K/CU MM
SEGMENTED NEUTROPHILS RELATIVE PERCENT: 69.2 % (ref 36–66)
TOTAL IMMATURE NEUTOROPHIL: 0.02 K/CU MM
TSH HIGH SENSITIVITY: 1.99 UIU/ML (ref 0.27–4.2)
WBC # BLD: 8.6 K/CU MM (ref 4–10.5)

## 2022-02-11 PROCEDURE — 84443 ASSAY THYROID STIM HORMONE: CPT

## 2022-02-11 PROCEDURE — 84439 ASSAY OF FREE THYROXINE: CPT

## 2022-02-11 PROCEDURE — 85025 COMPLETE CBC W/AUTO DIFF WBC: CPT

## 2022-02-11 PROCEDURE — 83036 HEMOGLOBIN GLYCOSYLATED A1C: CPT

## 2022-02-11 PROCEDURE — 36415 COLL VENOUS BLD VENIPUNCTURE: CPT

## 2022-02-12 LAB — T4 FREE: 1.56 NG/DL (ref 0.9–1.8)

## 2022-02-14 NOTE — RESULT ENCOUNTER NOTE
Autumn Martin Mrs. Edilia Brown blood work results came back; thyroid function values are normal; can continue with current thyroid dosing. Blood counts are normal.    Hemoglobin A1c/3-month average blood sugar/diabetes number is elevated more than previous, it is now technically within the \"prediabetic range;\"; really try to work on decreasing consumption of carbohydrates, processed foods, sugar etc.; we can recheck this value in 3 months; it would be recommended to schedule an appointment for about 3 months from now to follow-up on A1c value etc.  Let me know if you would like referral to a diabetic educator who can further discuss dietary and lifestyle practices to prevent progression of diabetes etc.    Please let me know if you have any other questions or concerns.   Thanks,  Sergey Hernandez

## 2022-03-26 DIAGNOSIS — E03.9 ACQUIRED HYPOTHYROIDISM: ICD-10-CM

## 2022-03-28 RX ORDER — LEVOTHYROXINE SODIUM 112 UG/1
TABLET ORAL
Qty: 90 TABLET | Refills: 0 | Status: SHIPPED | OUTPATIENT
Start: 2022-03-28 | End: 2022-06-20

## 2022-04-14 DIAGNOSIS — I10 ESSENTIAL HYPERTENSION: ICD-10-CM

## 2022-04-14 DIAGNOSIS — R00.2 PALPITATIONS: ICD-10-CM

## 2022-04-14 RX ORDER — METOPROLOL TARTRATE 50 MG/1
TABLET, FILM COATED ORAL
Qty: 180 TABLET | Refills: 0 | Status: SHIPPED | OUTPATIENT
Start: 2022-04-14 | End: 2022-06-20

## 2022-06-20 DIAGNOSIS — R00.2 PALPITATIONS: ICD-10-CM

## 2022-06-20 DIAGNOSIS — I10 ESSENTIAL HYPERTENSION: ICD-10-CM

## 2022-06-20 DIAGNOSIS — E03.9 ACQUIRED HYPOTHYROIDISM: ICD-10-CM

## 2022-06-20 RX ORDER — LEVOTHYROXINE SODIUM 112 UG/1
TABLET ORAL
Qty: 90 TABLET | Refills: 0 | Status: SHIPPED | OUTPATIENT
Start: 2022-06-20 | End: 2022-09-27

## 2022-06-20 RX ORDER — METOPROLOL TARTRATE 50 MG/1
TABLET, FILM COATED ORAL
Qty: 180 TABLET | Refills: 0 | Status: SHIPPED | OUTPATIENT
Start: 2022-06-20 | End: 2022-08-24 | Stop reason: SDUPTHER

## 2022-07-05 ENCOUNTER — OFFICE VISIT (OUTPATIENT)
Dept: FAMILY MEDICINE CLINIC | Age: 67
End: 2022-07-05
Payer: COMMERCIAL

## 2022-07-05 VITALS
SYSTOLIC BLOOD PRESSURE: 160 MMHG | TEMPERATURE: 97.2 F | OXYGEN SATURATION: 98 % | RESPIRATION RATE: 16 BRPM | WEIGHT: 232.6 LBS | HEART RATE: 65 BPM | DIASTOLIC BLOOD PRESSURE: 94 MMHG | BODY MASS INDEX: 38.71 KG/M2

## 2022-07-05 DIAGNOSIS — Z13.1 DIABETES MELLITUS SCREENING: ICD-10-CM

## 2022-07-05 DIAGNOSIS — Z00.00 WELCOME TO MEDICARE PREVENTIVE VISIT: Primary | ICD-10-CM

## 2022-07-05 PROCEDURE — 1123F ACP DISCUSS/DSCN MKR DOCD: CPT | Performed by: FAMILY MEDICINE

## 2022-07-05 PROCEDURE — G0402 INITIAL PREVENTIVE EXAM: HCPCS | Performed by: FAMILY MEDICINE

## 2022-07-05 ASSESSMENT — PATIENT HEALTH QUESTIONNAIRE - PHQ9
SUM OF ALL RESPONSES TO PHQ QUESTIONS 1-9: 0
2. FEELING DOWN, DEPRESSED OR HOPELESS: 0
SUM OF ALL RESPONSES TO PHQ9 QUESTIONS 1 & 2: 0
SUM OF ALL RESPONSES TO PHQ QUESTIONS 1-9: 0
1. LITTLE INTEREST OR PLEASURE IN DOING THINGS: 0
SUM OF ALL RESPONSES TO PHQ QUESTIONS 1-9: 0
SUM OF ALL RESPONSES TO PHQ QUESTIONS 1-9: 0

## 2022-07-05 NOTE — PROGRESS NOTES
Medicare Annual Wellness Visit    Viridiana Corona is here for Medicare AWV (patient is here for her medicare wellness visit)    Assessment & Plan   Welcome to Medicare preventive visit  -     [facility] Referral to ACP Clinical Specialist  Diabetes mellitus screening  -     Hemoglobin A1C; Future      Recommendations for Preventive Services Due: see orders and patient instructions/AVS.  Recommended screening schedule for the next 5-10 years is provided to the patient in written form: see Patient Instructions/AVS.  Uncontrolled hypertension, blood pressure elevated. Patient has an upcoming appointment with nephrologist, appreciate recommendations. Additionally, patient was advised to record home blood pressures and bring numbers next visit     Return for Medicare Annual Wellness Visit in 1 year. Subjective   The following acute and/or chronic problems were also addressed today:  Elevated Blood pressure    Patient's complete Health Risk Assessment and screening values have been reviewed and are found in Flowsheets. The following problems were reviewed today and where indicated follow up appointments were made and/or referrals ordered.     Positive Risk Factor Screenings with Interventions:               General Health and ACP:  General  In general, how would you say your health is?: Good  In the past 7 days, have you experienced any of the following: New or Increased Pain, New or Increased Fatigue, Loneliness, Social Isolation, Stress or Anger?: No  Do you get the social and emotional support that you need?: Yes  Do you have a Living Will?: (!) No    Advance Directives     Power of  Living Will ACP-Advance Directive ACP-Power of     Not on File Not on File Not on File Not on File      General Health Risk Interventions:  · No Living Will: Patient referred to [place] Habits/Nutrition:     Physical Activity: Insufficiently Active    Days of Exercise per Week: 2 days    Minutes of Exercise per Session: 20 min     Have you lost any weight without trying in the past 3 months?: No     Have you seen the dentist within the past year?: (!) No    Health Habits/Nutrition Interventions:  · Dental exam overdue:  patient encouraged to make appointment with his/her dentist    Hearing/Vision:  Do you or your family notice any trouble with your hearing that hasn't been managed with hearing aids?: No (patient has noticed her hearing is not as good as the past)  Do you have difficulty driving, watching TV, or doing any of your daily activities because of your eyesight?: No  Have you had an eye exam within the past year?: (!) No  No exam data present    Hearing/Vision Interventions:  · none            Objective   Vitals:    07/05/22 1052   BP: (!) 160/94   Site: Right Upper Arm   Position: Sitting   Cuff Size: Large Adult   Pulse: 65   Resp: 16   Temp: 97.2 °F (36.2 °C)   SpO2: 98%   Weight: 232 lb 9.6 oz (105.5 kg)      Body mass index is 38.71 kg/m². No Known Allergies  Prior to Visit Medications    Medication Sig Taking?  Authorizing Provider   metoprolol tartrate (LOPRESSOR) 50 MG tablet TAKE 1 & 1/2 (ONE & ONE-HALF) TABLETS BY MOUTH TWICE DAILY Yes [place], [place]   EUTHYROX 112 MCG tablet Take 1 tablet by mouth once daily Yes [place], [place]   indapamide (LOZOL) 2.5 MG tablet TAKE 1 TAB BY MOUTH IN THE MORNING Yes Maggie Rader MD   dilTIAZem (CARDIZEM) 30 MG tablet Take 1 tablet by mouth as needed (palpitation)  Patient not taking: Reported on 7/5/2022  Beatrice Ware MD       [facility] (Including outside providers/suppliers regularly involved in providing care):   Patient Care Team:  Yen Hernandes MD as PCP - General (Family Medicine)  [place], [place] as PCP - [facility] Empaneled Provider     Reviewed and updated this visit:  Tobacco  Allergies  Meds  Problems  Med Hx  Surg Hx  Soc Hx  Fam Hx

## 2022-07-05 NOTE — PATIENT INSTRUCTIONS
Personalized Preventive Plan for [facility] - 7/5/2022  Medicare offers a range of preventive health benefits. Some of the tests and screenings are paid in full while other may be subject to a deductible, co-insurance, and/or copay. Some of these benefits include a comprehensive review of your medical history including lifestyle, illnesses that may run in your family, and various assessments and screenings as appropriate. After reviewing your medical record and screening and assessments performed today your provider may have ordered immunizations, labs, imaging, and/or referrals for you. A list of these orders (if applicable) as well as your Preventive Care list are included within your After Visit Summary for your review. Other Preventive Recommendations:    · A preventive eye exam performed by an eye specialist is recommended every 1-2 years to screen for glaucoma; cataracts, macular degeneration, and other eye disorders. · A preventive dental visit is recommended every 6 months. · Try to get at least 150 minutes of exercise per week or 10,000 steps per day on a pedometer . · Order or download the FREE \"Exercise & Physical Activity: Your Everyday Guide\" from [employer]. Call 5-661.186.9459 or search [employer] online. · You need 0111-8300 mg of calcium and 0741-9584 IU of vitamin D per day. It is possible to meet your calcium requirement with diet alone, but a vitamin D supplement is usually necessary to meet this goal.  · When exposed to the sun, use a sunscreen that protects against both UVA and UVB radiation with an SPF of 30 or greater. Reapply every 2 to 3 hours or after sweating, drying off with a towel, or swimming. · Always wear a seat belt when traveling in a car. Always wear a helmet when riding a bicycle or motorcycle.

## 2022-07-06 LAB
ESTIMATED AVERAGE GLUCOSE: 114 MG/DL
HBA1C MFR BLD: 5.6 %

## 2022-07-08 ENCOUNTER — CLINICAL DOCUMENTATION (OUTPATIENT)
Dept: FAMILY MEDICINE CLINIC | Age: 67
End: 2022-07-08

## 2022-07-12 PROBLEM — E87.6 HYPOPOTASSEMIA: Status: ACTIVE | Noted: 2022-07-12

## 2022-07-18 ENCOUNTER — CLINICAL DOCUMENTATION (OUTPATIENT)
Dept: FAMILY MEDICINE CLINIC | Age: 67
End: 2022-07-18

## 2022-07-18 NOTE — PROGRESS NOTES
Advance Care Planning   Ambulatory ACP Specialist Patient Outreach    Date:  7/18/2022  ACP Specialist:  Radha Longo    Outreach call to patient in follow-up to ACP Specialist referral from: Stefanie Morrow MD    [x] PCP  [] Provider   [] Ambulatory Care Management [] Other for Reason:    [x] Advance Directive Assistance  [] Code Status Discussion  [] Complete Portable DNR Order  [] Discuss Goals of Care  [] Complete POST/MOST  [] Early ACP Decision-Making  [] Other    Date Referral Received:7/5/22    Today's Outreach:  [] First   [x] Second  [] Third                               Third outreach made by []  phone  [] email []   [employer]     Intervention:  [] Spoke with Patient  [x] Left VM requesting return call      Outcome:Called patient and left another voicemail. Next Step:   [] ACP scheduled conversation  [x] Outreach again in one week               [] Email / Mail ACP Info Sheets  [] Email / Mail Advance Directive            [] Close Referral. Routing closure to referring provider/staff and to ACP Specialist . [] Closure Letter mailed to Patient with Invitation to Contact ACP Specialist if/when ready.     Thank you for this referral.

## 2022-08-19 DIAGNOSIS — I10 ESSENTIAL HYPERTENSION: ICD-10-CM

## 2022-08-19 DIAGNOSIS — R00.2 PALPITATIONS: ICD-10-CM

## 2022-08-19 RX ORDER — METOPROLOL TARTRATE 50 MG/1
TABLET, FILM COATED ORAL
Qty: 180 TABLET | Refills: 0 | OUTPATIENT
Start: 2022-08-19

## 2022-08-21 DIAGNOSIS — R00.2 PALPITATIONS: ICD-10-CM

## 2022-08-21 DIAGNOSIS — I10 ESSENTIAL HYPERTENSION: ICD-10-CM

## 2022-08-22 RX ORDER — METOPROLOL TARTRATE 50 MG/1
TABLET, FILM COATED ORAL
Qty: 180 TABLET | Refills: 0 | OUTPATIENT
Start: 2022-08-22

## 2022-08-24 DIAGNOSIS — R00.2 PALPITATIONS: ICD-10-CM

## 2022-08-24 DIAGNOSIS — I10 ESSENTIAL HYPERTENSION: ICD-10-CM

## 2022-08-24 RX ORDER — METOPROLOL TARTRATE 50 MG/1
50 TABLET, FILM COATED ORAL DAILY
Qty: 90 TABLET | Refills: 2 | Status: SHIPPED | OUTPATIENT
Start: 2022-08-24 | End: 2022-10-17 | Stop reason: ALTCHOICE

## 2022-09-20 DIAGNOSIS — E03.9 ACQUIRED HYPOTHYROIDISM: ICD-10-CM

## 2022-09-20 RX ORDER — LEVOTHYROXINE SODIUM 112 UG/1
TABLET ORAL
Qty: 90 TABLET | Refills: 0 | OUTPATIENT
Start: 2022-09-20

## 2022-09-23 DIAGNOSIS — E03.9 ACQUIRED HYPOTHYROIDISM: ICD-10-CM

## 2022-09-23 NOTE — TELEPHONE ENCOUNTER
Pt. Has taken last of Euthyrox this morning. Pt. States pharmacy faxed PCP but PCP is not in office this week.

## 2022-09-27 RX ORDER — LEVOTHYROXINE SODIUM 112 UG/1
TABLET ORAL
Qty: 90 TABLET | Refills: 0 | Status: SHIPPED | OUTPATIENT
Start: 2022-09-27

## 2022-09-27 RX ORDER — LEVOTHYROXINE SODIUM 112 UG/1
TABLET ORAL
Qty: 90 TABLET | Refills: 0 | OUTPATIENT
Start: 2022-09-27

## 2022-10-13 ENCOUNTER — HOSPITAL ENCOUNTER (OUTPATIENT)
Age: 67
Discharge: HOME | End: 2022-10-13
Payer: COMMERCIAL

## 2022-10-13 LAB
ALBUMIN SERPL-MCNC: 4.3 GM/DL (ref 3.4–5)
ANION GAP SERPL CALCULATED.3IONS-SCNC: 9 MMOL/L (ref 4–16)
BUN BLDV-MCNC: 20 MG/DL (ref 6–23)
CALCIUM SERPL-MCNC: 9.5 MG/DL (ref 8.3–10.6)
CHLORIDE BLD-SCNC: 98 MMOL/L (ref 99–110)
CO2: 29 MMOL/L (ref 21–32)
CREAT SERPL-MCNC: 1.2 MG/DL (ref 0.6–1.1)
GFR AFRICAN AMERICAN: 54 ML/MIN/1.73M2
GFR NON-AFRICAN AMERICAN: 45 ML/MIN/1.73M2
GLUCOSE BLD-MCNC: 106 MG/DL (ref 70–99)
PHOSPHORUS: 3 MG/DL (ref 2.5–4.9)
POTASSIUM SERPL-SCNC: 4 MMOL/L (ref 3.5–5.1)
SODIUM BLD-SCNC: 136 MMOL/L (ref 135–145)

## 2022-10-13 PROCEDURE — 36415 COLL VENOUS BLD VENIPUNCTURE: CPT

## 2022-10-13 PROCEDURE — 80069 RENAL FUNCTION PANEL: CPT

## 2023-01-04 ENCOUNTER — TELEPHONE (OUTPATIENT)
Dept: FAMILY MEDICINE CLINIC | Age: 68
End: 2023-01-04

## 2023-01-04 DIAGNOSIS — E03.9 ACQUIRED HYPOTHYROIDISM: ICD-10-CM

## 2023-01-04 RX ORDER — LEVOTHYROXINE SODIUM 112 UG/1
TABLET ORAL
Qty: 30 TABLET | Refills: 0 | Status: SHIPPED | OUTPATIENT
Start: 2023-01-04

## 2023-01-04 NOTE — TELEPHONE ENCOUNTER
Let pt know previous PCP no longer here. Will send in 30 day supply and pt needs to find new provider for further refills.  thanks

## 2023-01-04 NOTE — TELEPHONE ENCOUNTER
Called patient and updated that 30 day supply of medication was sent to pharmacy. Explained to patient that her PCP is no longer with [facility] and she needs to find a new PCP. Patient voices understanding.   No questions or concerns voiced

## 2023-01-11 ENCOUNTER — HOSPITAL ENCOUNTER (OUTPATIENT)
Age: 68
Discharge: HOME | End: 2023-01-11
Payer: COMMERCIAL

## 2023-01-11 LAB
ALBUMIN SERPL-MCNC: 4.2 GM/DL (ref 3.4–5)
ANION GAP SERPL CALCULATED.3IONS-SCNC: 12 MMOL/L (ref 4–16)
BUN BLDV-MCNC: 16 MG/DL (ref 6–23)
CALCIUM SERPL-MCNC: 9.6 MG/DL (ref 8.3–10.6)
CHLORIDE BLD-SCNC: 98 MMOL/L (ref 99–110)
CO2: 26 MMOL/L (ref 21–32)
CREAT SERPL-MCNC: 1 MG/DL (ref 0.6–1.1)
GFR SERPL CREATININE-BSD FRML MDRD: >60 ML/MIN/1.73M2
GLUCOSE BLD-MCNC: 100 MG/DL (ref 70–99)
PHOSPHORUS: 2.7 MG/DL (ref 2.5–4.9)
POTASSIUM SERPL-SCNC: 3.9 MMOL/L (ref 3.5–5.1)
SODIUM BLD-SCNC: 136 MMOL/L (ref 135–145)

## 2023-01-11 PROCEDURE — 80069 RENAL FUNCTION PANEL: CPT

## 2023-01-11 PROCEDURE — 36415 COLL VENOUS BLD VENIPUNCTURE: CPT

## 2023-01-24 PROBLEM — N18.31 STAGE 3A CHRONIC KIDNEY DISEASE (HCC): Status: RESOLVED | Noted: 2021-08-02 | Resolved: 2023-01-24

## 2023-01-24 PROBLEM — N18.1 CHRONIC KIDNEY DISEASE, STAGE I: Status: ACTIVE | Noted: 2023-01-24

## 2023-03-29 ENCOUNTER — OFFICE VISIT (OUTPATIENT)
Dept: FAMILY MEDICINE CLINIC | Age: 68
End: 2023-03-29
Payer: COMMERCIAL

## 2023-03-29 VITALS
DIASTOLIC BLOOD PRESSURE: 74 MMHG | HEIGHT: 64 IN | OXYGEN SATURATION: 97 % | WEIGHT: 232.4 LBS | RESPIRATION RATE: 16 BRPM | HEART RATE: 76 BPM | BODY MASS INDEX: 39.67 KG/M2 | SYSTOLIC BLOOD PRESSURE: 118 MMHG

## 2023-03-29 DIAGNOSIS — I10 ESSENTIAL HYPERTENSION: Primary | ICD-10-CM

## 2023-03-29 DIAGNOSIS — E66.01 SEVERE OBESITY (BMI 35.0-39.9) WITH COMORBIDITY (HCC): ICD-10-CM

## 2023-03-29 DIAGNOSIS — E03.9 ACQUIRED HYPOTHYROIDISM: ICD-10-CM

## 2023-03-29 DIAGNOSIS — G47.33 OSA ON CPAP: ICD-10-CM

## 2023-03-29 DIAGNOSIS — M25.561 CHRONIC PAIN OF RIGHT KNEE: ICD-10-CM

## 2023-03-29 DIAGNOSIS — Z99.89 OSA ON CPAP: ICD-10-CM

## 2023-03-29 DIAGNOSIS — G89.29 CHRONIC PAIN OF RIGHT KNEE: ICD-10-CM

## 2023-03-29 PROBLEM — N17.9 ACUTE KIDNEY FAILURE, UNSPECIFIED (HCC): Status: RESOLVED | Noted: 2021-08-30 | Resolved: 2023-03-29

## 2023-03-29 PROBLEM — E87.6 HYPOPOTASSEMIA: Status: RESOLVED | Noted: 2022-07-12 | Resolved: 2023-03-29

## 2023-03-29 PROCEDURE — 3074F SYST BP LT 130 MM HG: CPT | Performed by: NURSE PRACTITIONER

## 2023-03-29 PROCEDURE — 99214 OFFICE O/P EST MOD 30 MIN: CPT | Performed by: NURSE PRACTITIONER

## 2023-03-29 PROCEDURE — 3078F DIAST BP <80 MM HG: CPT | Performed by: NURSE PRACTITIONER

## 2023-03-29 PROCEDURE — 1123F ACP DISCUSS/DSCN MKR DOCD: CPT | Performed by: NURSE PRACTITIONER

## 2023-03-29 RX ORDER — LEVOTHYROXINE SODIUM 112 UG/1
TABLET ORAL
Qty: 90 TABLET | Refills: 1 | Status: SHIPPED | OUTPATIENT
Start: 2023-03-29

## 2023-03-29 ASSESSMENT — PATIENT HEALTH QUESTIONNAIRE - PHQ9
SUM OF ALL RESPONSES TO PHQ QUESTIONS 1-9: 1
SUM OF ALL RESPONSES TO PHQ QUESTIONS 1-9: 1
1. LITTLE INTEREST OR PLEASURE IN DOING THINGS: 1
SUM OF ALL RESPONSES TO PHQ QUESTIONS 1-9: 1
SUM OF ALL RESPONSES TO PHQ QUESTIONS 1-9: 1
SUM OF ALL RESPONSES TO PHQ9 QUESTIONS 1 & 2: 1
2. FEELING DOWN, DEPRESSED OR HOPELESS: 0

## 2023-03-30 ENCOUNTER — HOSPITAL ENCOUNTER (OUTPATIENT)
Age: 68
Discharge: HOME | End: 2023-03-30
Payer: COMMERCIAL

## 2023-03-30 ENCOUNTER — HOSPITAL ENCOUNTER (OUTPATIENT)
Dept: GENERAL RADIOLOGY | Age: 68
Discharge: HOME | End: 2023-03-30
Payer: COMMERCIAL

## 2023-03-30 DIAGNOSIS — G89.29 CHRONIC PAIN OF RIGHT KNEE: ICD-10-CM

## 2023-03-30 DIAGNOSIS — M25.561 CHRONIC PAIN OF RIGHT KNEE: ICD-10-CM

## 2023-03-30 PROCEDURE — 73562 X-RAY EXAM OF KNEE 3: CPT

## 2023-04-03 DIAGNOSIS — M17.11 PRIMARY OSTEOARTHRITIS OF RIGHT KNEE: Primary | ICD-10-CM

## 2023-04-17 PROBLEM — E66.01 SEVERE OBESITY (BMI 35.0-39.9) WITH COMORBIDITY (HCC): Status: ACTIVE | Noted: 2023-04-17

## 2023-04-17 ASSESSMENT — ENCOUNTER SYMPTOMS
WHEEZING: 0
ABDOMINAL PAIN: 0
EYE PAIN: 0
DIARRHEA: 0
PHOTOPHOBIA: 0
BLOOD IN STOOL: 0
TROUBLE SWALLOWING: 0
RHINORRHEA: 0
COUGH: 0
CONSTIPATION: 0
SHORTNESS OF BREATH: 0
BACK PAIN: 0
NAUSEA: 0
SINUS PAIN: 0
CHEST TIGHTNESS: 0
VOMITING: 0
SORE THROAT: 0
SINUS PRESSURE: 0

## 2023-04-21 ENCOUNTER — OFFICE VISIT (OUTPATIENT)
Dept: ORTHOPEDIC SURGERY | Age: 68
End: 2023-04-21

## 2023-04-21 VITALS
WEIGHT: 230 LBS | RESPIRATION RATE: 16 BRPM | BODY MASS INDEX: 39.27 KG/M2 | HEART RATE: 72 BPM | SYSTOLIC BLOOD PRESSURE: 116 MMHG | HEIGHT: 64 IN | DIASTOLIC BLOOD PRESSURE: 70 MMHG | OXYGEN SATURATION: 97 %

## 2023-04-21 DIAGNOSIS — M17.11 PRIMARY OSTEOARTHRITIS OF RIGHT KNEE: Primary | ICD-10-CM

## 2023-04-21 ASSESSMENT — ENCOUNTER SYMPTOMS
NAUSEA: 0
FACIAL SWELLING: 0
COUGH: 0
SHORTNESS OF BREATH: 0
RHINORRHEA: 0
BACK PAIN: 0

## 2023-04-21 NOTE — PROGRESS NOTES
Natalie Llanos is a 64y.o. year old female who complains of Right knee pain and giving out, pain located medial side of the knee, popping sensation, stiffness, and swelling on the posterior part of the knee. Patient states that she has been taking Tylenol and pt isnt allowed to take Ibuprofen due to her kidney issues.
second injection.     Electronically signed by Jose Tomas PA-C on 4/21/2023 at 9:15 AM

## 2023-04-21 NOTE — PATIENT INSTRUCTIONS
Continue to weight bear as tolerated  Continue range of motion  Ice and elevate as needed  Tylenol or Motrin for pain  Injection given into the right knee  No extreme activities for 24 hours  Follow up in 6 weeks if better call and cancel    We are committed to providing you the best care possible. If you receive a survey after visiting one of our offices, please take time to share your experience concerning your physician office visit. These surveys are confidential and no health information about you is shared. We are eager to improve for you and we are counting on your feedback to help make that happen.

## 2023-06-02 ENCOUNTER — OFFICE VISIT (OUTPATIENT)
Dept: ORTHOPEDIC SURGERY | Age: 68
End: 2023-06-02
Payer: COMMERCIAL

## 2023-06-02 VITALS — HEIGHT: 64 IN | WEIGHT: 229 LBS | BODY MASS INDEX: 39.09 KG/M2

## 2023-06-02 DIAGNOSIS — M17.11 PRIMARY OSTEOARTHRITIS OF RIGHT KNEE: Primary | ICD-10-CM

## 2023-06-02 PROCEDURE — 1123F ACP DISCUSS/DSCN MKR DOCD: CPT

## 2023-06-02 PROCEDURE — 99213 OFFICE O/P EST LOW 20 MIN: CPT

## 2023-06-02 ASSESSMENT — ENCOUNTER SYMPTOMS
COUGH: 0
BACK PAIN: 0
FACIAL SWELLING: 0
NAUSEA: 0
SHORTNESS OF BREATH: 0
RHINORRHEA: 0

## 2023-06-02 NOTE — PROGRESS NOTES
Patient returns to the office today for FU of the right knee injection. Pt states the injection did help for a little bit but has started to notice increase pain within the last few weeks. Pt states she does have a difficult time with prolong walking or standing.

## 2023-06-02 NOTE — PROGRESS NOTES
6/2/2023   Chief Complaint   Patient presents with    Knee Pain     Right knee injection 4/21/23        History of Present Illness: Todays HPI:  Esthela Mendoza is a 79 y.o. female returning to the office today for continued management of right knee pain. Patient states the previous injection on 4/21/2023 gave her significant relief of her symptoms and she was feeling great up into about 2 weeks ago. She is noted the knee pain has crept back in a similar fashion from her first appointment. She states she is slightly better than her original baseline but she is open to considering total knee replacement in the near future. However, she has a scheduling issue that arises as she is trying to time up injections versus knee replacement to coincide with an upcoming wedding in October. Patient wants to maximize her ability to enjoy summer activities and the upcoming wedding while at the same time putting yourself in a proper position to receive a total knee replacement in the near future. Patient returns to the office today for FU of the right knee injection. Pt states the injection did help for a little bit but has started to notice increase pain within the last few weeks. Pt states she does have a difficult time with prolong walking or standing. Previous HPI:                     Esthela Mendoza is a 79 y.o. female presenting the office today as a new patient with right knee pain. Patient states her right knee is achy and painful most times of the day. She states she has discontinued a lot of the recreational activities such as hiking and biking that she previously enjoyed due to the knee pain. Patient notes she has had significant weight loss in the past but has since put on around 40 pounds due to the decreased activity levels.   She is hoping that there are some conservative measures she can pursue to buy her time through the summer months and she would like to consider total knee replacement

## 2023-06-02 NOTE — PATIENT INSTRUCTIONS
Continue weight-bearing as tolerated. Continue range of motion exercises as instructed. Ice and elevate as needed. Tylenol or Motrin for pain.    Steroid injection in late June or early July then late September   Knee replacement in January

## 2023-06-27 ENCOUNTER — OFFICE VISIT (OUTPATIENT)
Dept: CARDIOLOGY CLINIC | Age: 68
End: 2023-06-27
Payer: COMMERCIAL

## 2023-06-27 ENCOUNTER — NURSE ONLY (OUTPATIENT)
Dept: CARDIOLOGY CLINIC | Age: 68
End: 2023-06-27

## 2023-06-27 ENCOUNTER — OFFICE VISIT (OUTPATIENT)
Dept: ORTHOPEDIC SURGERY | Age: 68
End: 2023-06-27

## 2023-06-27 VITALS
DIASTOLIC BLOOD PRESSURE: 80 MMHG | HEIGHT: 64 IN | HEART RATE: 73 BPM | BODY MASS INDEX: 38.93 KG/M2 | SYSTOLIC BLOOD PRESSURE: 155 MMHG | WEIGHT: 228 LBS

## 2023-06-27 VITALS
HEART RATE: 76 BPM | BODY MASS INDEX: 38.93 KG/M2 | WEIGHT: 228 LBS | OXYGEN SATURATION: 97 % | HEIGHT: 64 IN | RESPIRATION RATE: 16 BRPM

## 2023-06-27 DIAGNOSIS — R06.02 SOB (SHORTNESS OF BREATH): Primary | ICD-10-CM

## 2023-06-27 DIAGNOSIS — M17.11 PRIMARY OSTEOARTHRITIS OF RIGHT KNEE: Primary | ICD-10-CM

## 2023-06-27 DIAGNOSIS — R00.2 PALPITATIONS: Primary | ICD-10-CM

## 2023-06-27 DIAGNOSIS — R06.02 SOB (SHORTNESS OF BREATH): ICD-10-CM

## 2023-06-27 PROCEDURE — 1123F ACP DISCUSS/DSCN MKR DOCD: CPT | Performed by: INTERNAL MEDICINE

## 2023-06-27 PROCEDURE — 3074F SYST BP LT 130 MM HG: CPT | Performed by: INTERNAL MEDICINE

## 2023-06-27 PROCEDURE — 3078F DIAST BP <80 MM HG: CPT | Performed by: INTERNAL MEDICINE

## 2023-06-27 PROCEDURE — 93000 ELECTROCARDIOGRAM COMPLETE: CPT | Performed by: INTERNAL MEDICINE

## 2023-06-27 PROCEDURE — 99214 OFFICE O/P EST MOD 30 MIN: CPT | Performed by: INTERNAL MEDICINE

## 2023-06-27 NOTE — PATIENT INSTRUCTIONS
**It is YOUR responsibilty to bring medication bottles and/or updated medication list to [facility]. This will allow us to better serve you and all your healthcare needs**    Please be informed that if you contact our office outside of normal business hours the physician on call cannot help with any scheduling or rescheduling issues, procedure instruction questions or any type of medication issue. We advise you for any urgent/emergency that you go to the nearest emergency room! PLEASE CALL OUR OFFICE DURING NORMAL BUSINESS HOURS    Monday - Friday   8 am to 5 pm    [place]: [address] [place]: [phone]    [place]:  [phone]    Thank you for allowing us to care for you today! We want to ensure we can follow your treatment plan and we strive to give you the best outcomes and experience possible. If you ever have a life threatening emergency and call 911 - for an ambulance (EMS)   Our providers can only care for you at:   [facility] or [facility]. Even if you have someone take you or you drive yourself we can only care for you in a [address] [place] [address] facility. Our providers are not setup at the other healthcare locations!

## 2023-06-27 NOTE — PROGRESS NOTES
Mayco Hernandez MD        OFFICE  FOLLOWUP NOTE    Chief complaints: patient is here for management of HTN, shortness of breath, hypothyroidsim, SVT  Subjective: patient feels better, no chest pain, no shortness of breath, no dizziness, no palpitations    HPI Kourtney Melena is a 76 y. o.year old who  has a past medical history of Acute kidney failure, unspecified ([address]), Diabetes mellitus ([address]), H/O 24 hour EKG monitoring, H/O echocardiogram, History of nuclear stress test, Hypertension, Hypopotassemia, NELSON on CPAP, and Thyroid disease. and presents for management of HTN, shortness of breath, hypothyroidsim, SVT, which are well controlled      Current Outpatient Medications   Medication Sig Dispense Refill    levothyroxine (SYNTHROID) 112 MCG tablet Take 1 tablet by mouth once daily 90 tablet 1    amLODIPine (NORVASC) 5 MG tablet Take 1 tablet by mouth daily 90 tablet 3    indapamide (LOZOL) 2.5 MG tablet TAKE 1 TAB BY MOUTH IN THE MORNING 90 tablet 3    Metoprolol Tartrate 75 MG TABS Take 75 mg by mouth 2 times daily 180 tablet 3    LYSINE PO Take by mouth       No current facility-administered medications for this visit. Allergies: Seasonal  Past Medical History:   Diagnosis Date    Acute kidney failure, unspecified ([address]) 2021    Diabetes mellitus ([address])     H/O 24 hour EKG monitoring 2019    Basic sinus rhythm Minimum HR 41 bpm APC noted brief SVT noted Pt reported symptoms of lightheadedness no arrhythmias noted. H/O echocardiogram 2019    EF 55-60%, Grade II DD, Mild MR, TR & PHTN. History of nuclear stress test 2019    normal study.     Hypertension     Hypopotassemia 2022    NELSON on CPAP     Thyroid disease      Past Surgical History:   Procedure Laterality Date    ANKLE FRACTURE SURGERY       SECTION      COLONOSCOPY  2016    int hem, repeat in 10 years    OVARY REMOVAL       Family History   Problem Relation Age of Onset    Arthritis Mother

## 2023-06-28 ASSESSMENT — ENCOUNTER SYMPTOMS
FACIAL SWELLING: 0
NAUSEA: 0
BACK PAIN: 0
RHINORRHEA: 0
SHORTNESS OF BREATH: 0
COUGH: 0

## 2023-06-30 ENCOUNTER — PROCEDURE VISIT (OUTPATIENT)
Dept: CARDIOLOGY CLINIC | Age: 68
End: 2023-06-30

## 2023-06-30 DIAGNOSIS — R06.02 SOB (SHORTNESS OF BREATH): ICD-10-CM

## 2023-06-30 DIAGNOSIS — R00.2 PALPITATIONS: ICD-10-CM

## 2023-06-30 LAB
LV EF: 58 %
LVEF MODALITY: NORMAL

## 2023-07-05 ENCOUNTER — HOSPITAL ENCOUNTER (OUTPATIENT)
Age: 68
Discharge: HOME | End: 2023-07-05
Payer: COMMERCIAL

## 2023-07-05 DIAGNOSIS — E87.6 HYPOPOTASSEMIA: ICD-10-CM

## 2023-07-05 DIAGNOSIS — N17.9 ACUTE RENAL FAILURE, UNSPECIFIED ACUTE RENAL FAILURE TYPE (HCC): ICD-10-CM

## 2023-07-05 DIAGNOSIS — N18.1 CHRONIC KIDNEY DISEASE, STAGE I: ICD-10-CM

## 2023-07-05 LAB
ALBUMIN SERPL-MCNC: 4 GM/DL (ref 3.4–5)
ANION GAP SERPL CALCULATED.3IONS-SCNC: 8 MMOL/L (ref 4–16)
BUN SERPL-MCNC: 34 MG/DL (ref 6–23)
CALCIUM SERPL-MCNC: 9.8 MG/DL (ref 8.3–10.6)
CHLORIDE BLD-SCNC: 99 MMOL/L (ref 99–110)
CO2: 30 MMOL/L (ref 21–32)
CREAT SERPL-MCNC: 1.1 MG/DL (ref 0.6–1.1)
GFR SERPL CREATININE-BSD FRML MDRD: 55 ML/MIN/1.73M2
GLUCOSE SERPL-MCNC: 78 MG/DL (ref 70–99)
PHOSPHORUS: 3.5 MG/DL (ref 2.5–4.9)
POTASSIUM SERPL-SCNC: 4 MMOL/L (ref 3.5–5.1)
SODIUM BLD-SCNC: 137 MMOL/L (ref 135–145)

## 2023-07-05 PROCEDURE — 36415 COLL VENOUS BLD VENIPUNCTURE: CPT

## 2023-07-05 PROCEDURE — 80069 RENAL FUNCTION PANEL: CPT

## 2023-07-12 ENCOUNTER — TELEPHONE (OUTPATIENT)
Dept: CARDIOLOGY CLINIC | Age: 68
End: 2023-07-12

## 2023-07-12 ENCOUNTER — PROCEDURE VISIT (OUTPATIENT)
Dept: CARDIOLOGY CLINIC | Age: 68
End: 2023-07-12

## 2023-07-12 DIAGNOSIS — R06.02 SOB (SHORTNESS OF BREATH): ICD-10-CM

## 2023-07-12 DIAGNOSIS — R00.2 PALPITATIONS: ICD-10-CM

## 2023-07-12 LAB
LV EF: 67 %
LVEF MODALITY: NORMAL

## 2023-07-12 NOTE — TELEPHONE ENCOUNTER
Called patient and provided the followin:  Left ventricular function is normal, EF 55-60%. Grade II diastolic dysfunction. Mild concentric left ventricular hypertrophy. Mildly dilated left atrium. Mild aortic insufficiency with PHT of 602 msec. Mitral annular calcification is present. Mild to moderate, mild tricuspid and pulmonic mitral regurgitation is   present. Mild mitral stenosis with a mean pressure gradient of 4 mmHg, and a MVA of   1.65cm^2. Mildly elevated pulmonary artery pressure, RVSP 36 mmHg. Dilation of the ascending aorta (4.1cm). No evidence of pericardial effusion. 2019:   LV function and size are normal, Ejection Fraction 55-60 %. Mild concentric left ventricular hypertrophy. No regional wall motion abnormalities were detected. Diastolic Dysfunction Grade II . Mildly dilated left atrium. Thickened but not stenotic aortic valve. Mitral annular calcification is present. Mild mitral and tricuspid regurgitation is present. Right ventricular systolic pressure of 34 mm Hg consistent with mild   pulmonary hypertension. No evidence of pericardial effusion.      Verified next appointment 2023    Patient voiced understanding

## 2023-07-13 ENCOUNTER — OFFICE VISIT (OUTPATIENT)
Dept: CARDIOLOGY CLINIC | Age: 68
End: 2023-07-13
Payer: COMMERCIAL

## 2023-07-13 VITALS
SYSTOLIC BLOOD PRESSURE: 110 MMHG | DIASTOLIC BLOOD PRESSURE: 70 MMHG | HEIGHT: 64 IN | HEART RATE: 64 BPM | BODY MASS INDEX: 39.23 KG/M2 | WEIGHT: 229.8 LBS

## 2023-07-13 DIAGNOSIS — I10 ESSENTIAL HYPERTENSION: ICD-10-CM

## 2023-07-13 PROCEDURE — 1123F ACP DISCUSS/DSCN MKR DOCD: CPT | Performed by: INTERNAL MEDICINE

## 2023-07-13 PROCEDURE — 3078F DIAST BP <80 MM HG: CPT | Performed by: INTERNAL MEDICINE

## 2023-07-13 PROCEDURE — 3074F SYST BP LT 130 MM HG: CPT | Performed by: INTERNAL MEDICINE

## 2023-07-13 PROCEDURE — 99214 OFFICE O/P EST MOD 30 MIN: CPT | Performed by: INTERNAL MEDICINE

## 2023-07-13 RX ORDER — METOPROLOL TARTRATE 75 MG/1
75 TABLET, FILM COATED ORAL 2 TIMES DAILY
Qty: 180 TABLET | Refills: 3 | Status: SHIPPED | OUTPATIENT
Start: 2023-07-13 | End: 2023-10-11

## 2023-07-13 RX ORDER — INDAPAMIDE 2.5 MG/1
TABLET, FILM COATED ORAL
Qty: 90 TABLET | Refills: 3 | Status: SHIPPED | OUTPATIENT
Start: 2023-07-13

## 2023-07-13 RX ORDER — AMLODIPINE BESYLATE 5 MG/1
5 TABLET ORAL DAILY
Qty: 90 TABLET | Refills: 3 | Status: SHIPPED | OUTPATIENT
Start: 2023-07-13 | End: 2024-07-07

## 2023-07-13 NOTE — PROGRESS NOTES
[id number] Score for Atrial Fibrillation Stroke Risk   Risk   Factors  Component Value   C CHF No 0   H HTN Yes 1   A2 Age >= 76 No,  (77 y.o.) 0   D DM No 0   S2 Prior Stroke/TIA No 0   V Vascular Disease No 0   A Age 77-78 Yes,  (77 y.o.) 1   Sc Sex female 1    [id number]  Score  3   Score last updated 7/13/23 13:48 AM EDT    Click here for a link to the UpToDate guideline \"Atrial Fibrillation: Anticoagulation therapy to prevent embolization    Disclaimer: Risk Score calculation is dependent on accuracy of patient problem list and past encounter diagnosis.

## 2023-07-13 NOTE — PROGRESS NOTES
Reuben Bell MD        OFFICE  FOLLOWUP NOTE    Chief complaints: patient is here for management of  HTN, shortness of breath, hypothyroidsim, SVT, VHD  Subjective: patient feels better, no chest pain, no shortness of breath, no dizziness, no palpitations    HPI Carleen Paul is a 76 y. o.year old who  has a past medical history of Acute kidney failure, unspecified ([address]), Diabetes mellitus ([address]), H/O 24 hour EKG monitoring, H/O echocardiogram, History of nuclear stress test, Hypertension, Hypopotassemia, NELSON on CPAP, and Thyroid disease. and presents for management of HTN, shortness of breath, hypothyroidsim, SVT, VHD, which are well controlled      Current Outpatient Medications   Medication Sig Dispense Refill    levothyroxine (SYNTHROID) 112 MCG tablet Take 1 tablet by mouth once daily 90 tablet 1    amLODIPine (NORVASC) 5 MG tablet Take 1 tablet by mouth daily 90 tablet 3    indapamide (LOZOL) 2.5 MG tablet TAKE 1 TAB BY MOUTH IN THE MORNING 90 tablet 3    Metoprolol Tartrate 75 MG TABS Take 75 mg by mouth 2 times daily 180 tablet 3    LYSINE PO Take by mouth       No current facility-administered medications for this visit. Allergies: Seasonal  Past Medical History:   Diagnosis Date    Acute kidney failure, unspecified ([address]) 2021    Diabetes mellitus ([address])     H/O 24 hour EKG monitoring 2019    Basic sinus rhythm Minimum HR 41 bpm APC noted brief SVT noted Pt reported symptoms of lightheadedness no arrhythmias noted. H/O echocardiogram 2019    EF 55-60%, Grade II DD, Mild MR, TR & PHTN. History of nuclear stress test 2019    normal study.     Hypertension     Hypopotassemia 2022    NELSON on CPAP     Thyroid disease      Past Surgical History:   Procedure Laterality Date    ANKLE FRACTURE SURGERY       SECTION      COLONOSCOPY  2016    int hem, repeat in 10 years    OVARY REMOVAL       Family History   Problem Relation Age of Onset    Arthritis

## 2023-07-20 ENCOUNTER — TELEPHONE (OUTPATIENT)
Dept: CARDIOLOGY CLINIC | Age: 68
End: 2023-07-20

## 2023-07-20 NOTE — TELEPHONE ENCOUNTER
Called patient and provided the following:    NM STRESS:  Supervising physician Dr. Janee Jurado . Normal LV function. normal EDV   There is normal isotope uptake following exercise and at rest. There is no   evidence of exercise induced ischemia. This is a normal study.     Left voicemail    Verified next appointment date:  10/13/2023 at 10:20

## 2023-07-31 ENCOUNTER — OFFICE VISIT (OUTPATIENT)
Dept: FAMILY MEDICINE CLINIC | Age: 68
End: 2023-07-31
Payer: COMMERCIAL

## 2023-07-31 VITALS
OXYGEN SATURATION: 97 % | WEIGHT: 232 LBS | BODY MASS INDEX: 39.61 KG/M2 | TEMPERATURE: 98 F | HEART RATE: 61 BPM | SYSTOLIC BLOOD PRESSURE: 120 MMHG | DIASTOLIC BLOOD PRESSURE: 78 MMHG | HEIGHT: 64 IN | RESPIRATION RATE: 16 BRPM

## 2023-07-31 DIAGNOSIS — R73.01 IFG (IMPAIRED FASTING GLUCOSE): ICD-10-CM

## 2023-07-31 DIAGNOSIS — N18.31 STAGE 3A CHRONIC KIDNEY DISEASE (HCC): ICD-10-CM

## 2023-07-31 DIAGNOSIS — E03.9 ACQUIRED HYPOTHYROIDISM: ICD-10-CM

## 2023-07-31 DIAGNOSIS — Z23 NEED FOR PROPHYLACTIC VACCINATION AND INOCULATION AGAINST VARICELLA: ICD-10-CM

## 2023-07-31 DIAGNOSIS — I10 ESSENTIAL HYPERTENSION: ICD-10-CM

## 2023-07-31 DIAGNOSIS — M54.2 NECK PAIN: ICD-10-CM

## 2023-07-31 DIAGNOSIS — R53.83 OTHER FATIGUE: ICD-10-CM

## 2023-07-31 DIAGNOSIS — Z00.00 INITIAL MEDICARE ANNUAL WELLNESS VISIT: Primary | ICD-10-CM

## 2023-07-31 LAB
BASOPHILS # BLD: 0.1 K/UL (ref 0–0.2)
BASOPHILS NFR BLD: 0.8 %
DEPRECATED RDW RBC AUTO: 14.8 % (ref 12.4–15.4)
EOSINOPHIL # BLD: 0.1 K/UL (ref 0–0.6)
EOSINOPHIL NFR BLD: 1.2 %
HCT VFR BLD AUTO: 42.5 % (ref 36–48)
HGB BLD-MCNC: 14.9 G/DL (ref 12–16)
LYMPHOCYTES # BLD: 1.7 K/UL (ref 1–5.1)
LYMPHOCYTES NFR BLD: 23.7 %
MCH RBC QN AUTO: 31.9 PG (ref 26–34)
MCHC RBC AUTO-ENTMCNC: 35 G/DL (ref 31–36)
MCV RBC AUTO: 91.1 FL (ref 80–100)
MONOCYTES # BLD: 0.4 K/UL (ref 0–1.3)
MONOCYTES NFR BLD: 5.6 %
NEUTROPHILS # BLD: 5 K/UL (ref 1.7–7.7)
NEUTROPHILS NFR BLD: 68.7 %
PLATELET # BLD AUTO: 200 K/UL (ref 135–450)
PMV BLD AUTO: 10.8 FL (ref 5–10.5)
RBC # BLD AUTO: 4.66 M/UL (ref 4–5.2)
WBC # BLD AUTO: 7.2 K/UL (ref 4–11)

## 2023-07-31 PROCEDURE — G0438 PPPS, INITIAL VISIT: HCPCS | Performed by: NURSE PRACTITIONER

## 2023-07-31 PROCEDURE — 3074F SYST BP LT 130 MM HG: CPT | Performed by: NURSE PRACTITIONER

## 2023-07-31 PROCEDURE — 3078F DIAST BP <80 MM HG: CPT | Performed by: NURSE PRACTITIONER

## 2023-07-31 PROCEDURE — 99214 OFFICE O/P EST MOD 30 MIN: CPT | Performed by: NURSE PRACTITIONER

## 2023-07-31 PROCEDURE — 1123F ACP DISCUSS/DSCN MKR DOCD: CPT | Performed by: NURSE PRACTITIONER

## 2023-07-31 PROCEDURE — 36415 COLL VENOUS BLD VENIPUNCTURE: CPT | Performed by: NURSE PRACTITIONER

## 2023-07-31 RX ORDER — LEVOTHYROXINE SODIUM 112 UG/1
TABLET ORAL
Qty: 90 TABLET | Refills: 3 | Status: SHIPPED | OUTPATIENT
Start: 2023-07-31

## 2023-07-31 ASSESSMENT — PATIENT HEALTH QUESTIONNAIRE - PHQ9
9. THOUGHTS THAT YOU WOULD BE BETTER OFF DEAD, OR OF HURTING YOURSELF: 0
10. IF YOU CHECKED OFF ANY PROBLEMS, HOW DIFFICULT HAVE THESE PROBLEMS MADE IT FOR YOU TO DO YOUR WORK, TAKE CARE OF THINGS AT HOME, OR GET ALONG WITH OTHER PEOPLE: 1
7. TROUBLE CONCENTRATING ON THINGS, SUCH AS READING THE NEWSPAPER OR WATCHING TELEVISION: 0
5. POOR APPETITE OR OVEREATING: 1
SUM OF ALL RESPONSES TO PHQ QUESTIONS 1-9: 10
3. TROUBLE FALLING OR STAYING ASLEEP: 3
8. MOVING OR SPEAKING SO SLOWLY THAT OTHER PEOPLE COULD HAVE NOTICED. OR THE OPPOSITE, BEING SO FIGETY OR RESTLESS THAT YOU HAVE BEEN MOVING AROUND A LOT MORE THAN USUAL: 0
2. FEELING DOWN, DEPRESSED OR HOPELESS: 3
6. FEELING BAD ABOUT YOURSELF - OR THAT YOU ARE A FAILURE OR HAVE LET YOURSELF OR YOUR FAMILY DOWN: 0
SUM OF ALL RESPONSES TO PHQ QUESTIONS 1-9: 10
4. FEELING TIRED OR HAVING LITTLE ENERGY: 3
SUM OF ALL RESPONSES TO PHQ QUESTIONS 1-9: 10
SUM OF ALL RESPONSES TO PHQ QUESTIONS 1-9: 10

## 2023-07-31 ASSESSMENT — LIFESTYLE VARIABLES
HOW OFTEN DO YOU HAVE A DRINK CONTAINING ALCOHOL: NEVER
HOW MANY STANDARD DRINKS CONTAINING ALCOHOL DO YOU HAVE ON A TYPICAL DAY: PATIENT DOES NOT DRINK

## 2023-08-01 LAB
ALBUMIN SERPL-MCNC: 5 G/DL (ref 3.4–5)
ALBUMIN/GLOB SERPL: 2 {RATIO} (ref 1.1–2.2)
ALP SERPL-CCNC: 74 U/L (ref 40–129)
ALT SERPL-CCNC: 12 U/L (ref 10–40)
ANION GAP SERPL CALCULATED.3IONS-SCNC: 16 MMOL/L (ref 3–16)
AST SERPL-CCNC: 14 U/L (ref 15–37)
BILIRUB SERPL-MCNC: 0.6 MG/DL (ref 0–1)
BUN SERPL-MCNC: 21 MG/DL (ref 7–20)
CALCIUM SERPL-MCNC: 10.5 MG/DL (ref 8.3–10.6)
CHLORIDE SERPL-SCNC: 98 MMOL/L (ref 99–110)
CO2 SERPL-SCNC: 26 MMOL/L (ref 21–32)
CREAT SERPL-MCNC: 1.2 MG/DL (ref 0.6–1.2)
EST. AVERAGE GLUCOSE BLD GHB EST-MCNC: 108.3 MG/DL
GFR SERPLBLD CREATININE-BSD FMLA CKD-EPI: 49 ML/MIN/{1.73_M2}
GLUCOSE P FAST SERPL-MCNC: 102 MG/DL (ref 70–99)
HBA1C MFR BLD: 5.4 %
POTASSIUM SERPL-SCNC: 3.8 MMOL/L (ref 3.5–5.1)
PROT SERPL-MCNC: 7.5 G/DL (ref 6.4–8.2)
SODIUM SERPL-SCNC: 140 MMOL/L (ref 136–145)
T4 FREE SERPL-MCNC: 1.8 NG/DL (ref 0.9–1.8)
TSH SERPL DL<=0.005 MIU/L-ACNC: 1.6 UIU/ML (ref 0.27–4.2)

## 2023-09-19 ENCOUNTER — OFFICE VISIT (OUTPATIENT)
Dept: ORTHOPEDIC SURGERY | Age: 68
End: 2023-09-19
Payer: COMMERCIAL

## 2023-09-19 VITALS
OXYGEN SATURATION: 96 % | HEART RATE: 92 BPM | HEIGHT: 64 IN | BODY MASS INDEX: 39.44 KG/M2 | WEIGHT: 231 LBS | RESPIRATION RATE: 16 BRPM

## 2023-09-19 DIAGNOSIS — M17.11 PRIMARY OSTEOARTHRITIS OF RIGHT KNEE: Primary | ICD-10-CM

## 2023-09-19 PROCEDURE — 99213 OFFICE O/P EST LOW 20 MIN: CPT | Performed by: ORTHOPAEDIC SURGERY

## 2023-09-19 PROCEDURE — 1123F ACP DISCUSS/DSCN MKR DOCD: CPT | Performed by: ORTHOPAEDIC SURGERY

## 2023-09-19 PROCEDURE — 20610 DRAIN/INJ JOINT/BURSA W/O US: CPT | Performed by: ORTHOPAEDIC SURGERY

## 2023-09-19 RX ORDER — TRIAMCINOLONE ACETONIDE 40 MG/ML
40 INJECTION, SUSPENSION INTRA-ARTICULAR; INTRAMUSCULAR ONCE
Status: COMPLETED | OUTPATIENT
Start: 2023-09-19 | End: 2023-09-19

## 2023-09-19 RX ADMIN — TRIAMCINOLONE ACETONIDE 40 MG: 40 INJECTION, SUSPENSION INTRA-ARTICULAR; INTRAMUSCULAR at 13:54

## 2023-09-19 ASSESSMENT — ENCOUNTER SYMPTOMS
SHORTNESS OF BREATH: 0
EYE PAIN: 0
CHEST TIGHTNESS: 0
WHEEZING: 0
EYE REDNESS: 0
COLOR CHANGE: 0
VOMITING: 0

## 2023-09-19 NOTE — PATIENT INSTRUCTIONS
Continue to weight bear as tolerated  Continue range of motion  Ice and elevate as needed  Tylenol or Motrin for pain  Injection given into the right knee  No high impact activities for a least 24-48 hours  Follow up in 3 months to discuss right total knee arthroplasty for January surgery    We are committed to providing you the best care possible. If you receive a survey after visiting one of our offices, please take time to share your experience concerning your physician office visit. These surveys are confidential and no health information about you is shared. We are eager to improve for you and we are counting on your feedback to help make that happen.

## 2023-09-19 NOTE — PROGRESS NOTES
2023   Chief Complaint   Patient presents with    Knee Pain     Right          History of Present Illness:                             Fatoumata Olivas is a 76 y.o. female who presents today for evaluation of her right knee pain and discussion of further treatment options. She has been treated appropriately with conservative treatments by the physician assistant. She was told that she may be a candidate for knee replacement surgery and is here today for discussion of surgical planning. She has responded well in the past to steroid injections but does feel that the eventually wear off and that her symptoms continue to progress and dysfunction worsens over time. She has noticed increasing difficulty with her exercise activities and prolonged aching soreness with standing activities. She has been noticing her activity level and quality of life has been deteriorating over time because of the pain and stiffness in her knee. She believes that she will benefit from knee replacement but has upcoming complications that prevent her from moving forward with surgery at this time. Medical History  Patient's medications, allergies, past medical, surgical, social and family histories were reviewed and updated as appropriate. Past Medical History:   Diagnosis Date    Acute kidney failure, unspecified ([address]) 2021    Diabetes mellitus ([address])     H/O 24 hour EKG monitoring 2019    Basic sinus rhythm Minimum HR 41 bpm APC noted brief SVT noted Pt reported symptoms of lightheadedness no arrhythmias noted. H/O echocardiogram 2019    EF 55-60%, Grade II DD, Mild MR, TR & PHTN. History of nuclear stress test 2019    normal study.     Hypertension     Hypopotassemia 2022    NELSON on CPAP     Thyroid disease      Past Surgical History:   Procedure Laterality Date    ANKLE FRACTURE SURGERY       SECTION      COLONOSCOPY  2016    int hem, repeat in 10 years    OVARY

## 2023-10-04 ENCOUNTER — HOSPITAL ENCOUNTER (EMERGENCY)
Age: 68
Discharge: HOME | End: 2023-10-04
Attending: EMERGENCY MEDICINE
Payer: COMMERCIAL

## 2023-10-04 ENCOUNTER — APPOINTMENT (OUTPATIENT)
Dept: GENERAL RADIOLOGY | Age: 68
End: 2023-10-04
Payer: COMMERCIAL

## 2023-10-04 VITALS
DIASTOLIC BLOOD PRESSURE: 60 MMHG | TEMPERATURE: 97.4 F | OXYGEN SATURATION: 95 % | HEIGHT: 64 IN | BODY MASS INDEX: 38.58 KG/M2 | RESPIRATION RATE: 10 BRPM | HEART RATE: 67 BPM | SYSTOLIC BLOOD PRESSURE: 133 MMHG | WEIGHT: 226 LBS

## 2023-10-04 DIAGNOSIS — I48.0 PAROXYSMAL A-FIB (HCC): Primary | ICD-10-CM

## 2023-10-04 LAB
ALBUMIN SERPL-MCNC: 4.5 GM/DL (ref 3.4–5)
ALP BLD-CCNC: 83 IU/L (ref 40–129)
ALT SERPL-CCNC: 31 U/L (ref 10–40)
ANION GAP SERPL CALCULATED.3IONS-SCNC: 17 MMOL/L (ref 4–16)
AST SERPL-CCNC: 16 IU/L (ref 15–37)
B PARAP IS1001 DNA NPH QL NAA+NON-PROBE: NOT DETECTED
B PERT.PT PRMT NPH QL NAA+NON-PROBE: NOT DETECTED
BASOPHILS ABSOLUTE: 0 K/CU MM
BASOPHILS RELATIVE PERCENT: 0.4 % (ref 0–1)
BILIRUB SERPL-MCNC: 0.6 MG/DL (ref 0–1)
BUN SERPL-MCNC: 21 MG/DL (ref 6–23)
C PNEUM DNA NPH QL NAA+NON-PROBE: NOT DETECTED
CALCIUM SERPL-MCNC: 10.1 MG/DL (ref 8.3–10.6)
CHLORIDE BLD-SCNC: 97 MMOL/L (ref 99–110)
CO2: 24 MMOL/L (ref 21–32)
CREAT SERPL-MCNC: 1.2 MG/DL (ref 0.6–1.1)
DIFFERENTIAL TYPE: ABNORMAL
EKG DIAGNOSIS: NORMAL
EKG Q-T INTERVAL: 278 MS
EKG QRS DURATION: 90 MS
EKG QTC CALCULATION (BAZETT): 402 MS
EKG R AXIS: -17 DEGREES
EKG T AXIS: 7 DEGREES
EKG VENTRICULAR RATE: 126 BPM
EOSINOPHILS ABSOLUTE: 0.1 K/CU MM
EOSINOPHILS RELATIVE PERCENT: 1.2 % (ref 0–3)
FLUAV H1 2009 PAN RNA NPH NAA+NON-PROBE: NOT DETECTED
FLUAV H1 RNA NPH QL NAA+NON-PROBE: NOT DETECTED
FLUAV H3 RNA NPH QL NAA+NON-PROBE: NOT DETECTED
FLUAV RNA NPH QL NAA+NON-PROBE: NOT DETECTED
FLUBV RNA NPH QL NAA+NON-PROBE: NOT DETECTED
GFR SERPL CREATININE-BSD FRML MDRD: 49 ML/MIN/1.73M2
GLUCOSE SERPL-MCNC: 145 MG/DL (ref 70–99)
HADV DNA NPH QL NAA+NON-PROBE: NOT DETECTED
HCOV 229E RNA NPH QL NAA+NON-PROBE: NOT DETECTED
HCOV HKU1 RNA NPH QL NAA+NON-PROBE: NOT DETECTED
HCOV NL63 RNA NPH QL NAA+NON-PROBE: NOT DETECTED
HCOV OC43 RNA NPH QL NAA+NON-PROBE: NOT DETECTED
HCT VFR BLD CALC: 47 % (ref 37–47)
HEMOGLOBIN: 16.1 GM/DL (ref 12.5–16)
HMPV RNA NPH QL NAA+NON-PROBE: NOT DETECTED
HPIV1 RNA NPH QL NAA+NON-PROBE: NOT DETECTED
HPIV2 RNA NPH QL NAA+NON-PROBE: NOT DETECTED
HPIV3 RNA NPH QL NAA+NON-PROBE: NOT DETECTED
HPIV4 RNA NPH QL NAA+NON-PROBE: NOT DETECTED
IMMATURE NEUTROPHIL %: 0.3 % (ref 0–0.43)
LYMPHOCYTES ABSOLUTE: 1.9 K/CU MM
LYMPHOCYTES RELATIVE PERCENT: 20.1 % (ref 24–44)
M PNEUMO DNA NPH QL NAA+NON-PROBE: NOT DETECTED
MAGNESIUM: 2 MG/DL (ref 1.8–2.4)
MCH RBC QN AUTO: 30.4 PG (ref 27–31)
MCHC RBC AUTO-ENTMCNC: 34.3 % (ref 32–36)
MCV RBC AUTO: 88.8 FL (ref 78–100)
MONOCYTES ABSOLUTE: 0.5 K/CU MM
MONOCYTES RELATIVE PERCENT: 5.2 % (ref 0–4)
PDW BLD-RTO: 12.7 % (ref 11.7–14.9)
PLATELET # BLD: 257 K/CU MM (ref 140–440)
PMV BLD AUTO: 11.8 FL (ref 7.5–11.1)
POTASSIUM SERPL-SCNC: 3.5 MMOL/L (ref 3.5–5.1)
PRO-BNP: 797.5 PG/ML
RBC # BLD: 5.29 M/CU MM (ref 4.2–5.4)
RSV RNA NPH QL NAA+NON-PROBE: NOT DETECTED
RV+EV RNA NPH QL NAA+NON-PROBE: NOT DETECTED
SARS-COV-2 RNA NPH QL NAA+NON-PROBE: ABNORMAL
SEGMENTED NEUTROPHILS ABSOLUTE COUNT: 6.9 K/CU MM
SEGMENTED NEUTROPHILS RELATIVE PERCENT: 72.8 % (ref 36–66)
SODIUM BLD-SCNC: 138 MMOL/L (ref 135–145)
T4 FREE SERPL-MCNC: 1.47 NG/DL (ref 0.9–1.8)
TOTAL IMMATURE NEUTOROPHIL: 0.03 K/CU MM
TOTAL PROTEIN: 7.3 GM/DL (ref 6.4–8.2)
TROPONIN, HIGH SENSITIVITY: 8 NG/L (ref 0–14)
TROPONIN, HIGH SENSITIVITY: 9 NG/L (ref 0–14)
TSH SERPL DL<=0.005 MIU/L-ACNC: 2.19 UIU/ML (ref 0.27–4.2)
WBC # BLD: 9.5 K/CU MM (ref 4–10.5)

## 2023-10-04 PROCEDURE — 71045 X-RAY EXAM CHEST 1 VIEW: CPT

## 2023-10-04 PROCEDURE — 0202U NFCT DS 22 TRGT SARS-COV-2: CPT

## 2023-10-04 PROCEDURE — 83735 ASSAY OF MAGNESIUM: CPT

## 2023-10-04 PROCEDURE — 93005 ELECTROCARDIOGRAM TRACING: CPT | Performed by: EMERGENCY MEDICINE

## 2023-10-04 PROCEDURE — 96360 HYDRATION IV INFUSION INIT: CPT

## 2023-10-04 PROCEDURE — 99285 EMERGENCY DEPT VISIT HI MDM: CPT

## 2023-10-04 PROCEDURE — 83880 ASSAY OF NATRIURETIC PEPTIDE: CPT

## 2023-10-04 PROCEDURE — 84484 ASSAY OF TROPONIN QUANT: CPT

## 2023-10-04 PROCEDURE — 2580000003 HC RX 258: Performed by: EMERGENCY MEDICINE

## 2023-10-04 PROCEDURE — 85025 COMPLETE CBC W/AUTO DIFF WBC: CPT

## 2023-10-04 PROCEDURE — 93010 ELECTROCARDIOGRAM REPORT: CPT | Performed by: INTERNAL MEDICINE

## 2023-10-04 PROCEDURE — 80053 COMPREHEN METABOLIC PANEL: CPT

## 2023-10-04 PROCEDURE — 84443 ASSAY THYROID STIM HORMONE: CPT

## 2023-10-04 PROCEDURE — 84439 ASSAY OF FREE THYROXINE: CPT

## 2023-10-04 RX ORDER — DILTIAZEM HYDROCHLORIDE 5 MG/ML
10 INJECTION INTRAVENOUS ONCE
Status: DISCONTINUED | OUTPATIENT
Start: 2023-10-04 | End: 2023-10-04 | Stop reason: HOSPADM

## 2023-10-04 RX ORDER — 0.9 % SODIUM CHLORIDE 0.9 %
1000 INTRAVENOUS SOLUTION INTRAVENOUS ONCE
Status: COMPLETED | OUTPATIENT
Start: 2023-10-04 | End: 2023-10-04

## 2023-10-04 RX ADMIN — SODIUM CHLORIDE 1000 ML: 9 INJECTION, SOLUTION INTRAVENOUS at 11:10

## 2023-10-04 ASSESSMENT — PAIN DESCRIPTION - LOCATION: LOCATION: CHEST

## 2023-10-04 ASSESSMENT — PAIN SCALES - GENERAL: PAINLEVEL_OUTOF10: 5

## 2023-10-04 ASSESSMENT — PAIN DESCRIPTION - DESCRIPTORS: DESCRIPTORS: PRESSURE

## 2023-10-04 ASSESSMENT — PAIN - FUNCTIONAL ASSESSMENT: PAIN_FUNCTIONAL_ASSESSMENT: 0-10

## 2023-10-04 NOTE — ED NOTES
Discharge instructions reviewed with patient. Reviewed medications with patient. No additional questions asked. Voiced understanding. Encouraged patient to follow up as discussed by the ED physician.       Veronica Shields RN  10/04/23 0952

## 2023-10-13 ENCOUNTER — OFFICE VISIT (OUTPATIENT)
Dept: CARDIOLOGY CLINIC | Age: 68
End: 2023-10-13
Payer: COMMERCIAL

## 2023-10-13 VITALS
SYSTOLIC BLOOD PRESSURE: 124 MMHG | HEIGHT: 64 IN | DIASTOLIC BLOOD PRESSURE: 78 MMHG | HEART RATE: 57 BPM | WEIGHT: 229.2 LBS | OXYGEN SATURATION: 98 % | BODY MASS INDEX: 39.13 KG/M2

## 2023-10-13 DIAGNOSIS — D68.69 SECONDARY HYPERCOAGULABLE STATE (HCC): ICD-10-CM

## 2023-10-13 DIAGNOSIS — I10 PRIMARY HYPERTENSION: ICD-10-CM

## 2023-10-13 DIAGNOSIS — I48.0 PAF (PAROXYSMAL ATRIAL FIBRILLATION) (HCC): Primary | ICD-10-CM

## 2023-10-13 DIAGNOSIS — I05.0 MITRAL VALVE STENOSIS, UNSPECIFIED ETIOLOGY: ICD-10-CM

## 2023-10-13 PROCEDURE — 3078F DIAST BP <80 MM HG: CPT | Performed by: INTERNAL MEDICINE

## 2023-10-13 PROCEDURE — 3074F SYST BP LT 130 MM HG: CPT | Performed by: INTERNAL MEDICINE

## 2023-10-13 PROCEDURE — 1123F ACP DISCUSS/DSCN MKR DOCD: CPT | Performed by: INTERNAL MEDICINE

## 2023-10-13 PROCEDURE — 99214 OFFICE O/P EST MOD 30 MIN: CPT | Performed by: INTERNAL MEDICINE

## 2023-10-17 ENCOUNTER — TELEPHONE (OUTPATIENT)
Dept: CARDIOLOGY CLINIC | Age: 68
End: 2023-10-17

## 2023-10-17 NOTE — TELEPHONE ENCOUNTER
Called patient and consult apt with Dr Shira Ware, made on 11/1/2023. Patient is out of town week of 10/22/2023.

## 2023-11-01 ENCOUNTER — INITIAL CONSULT (OUTPATIENT)
Dept: CARDIOLOGY CLINIC | Age: 68
End: 2023-11-01

## 2023-11-01 ENCOUNTER — NURSE ONLY (OUTPATIENT)
Dept: CARDIOLOGY CLINIC | Age: 68
End: 2023-11-01

## 2023-11-01 VITALS
WEIGHT: 232.4 LBS | HEIGHT: 64 IN | BODY MASS INDEX: 39.67 KG/M2 | DIASTOLIC BLOOD PRESSURE: 70 MMHG | HEART RATE: 56 BPM | SYSTOLIC BLOOD PRESSURE: 130 MMHG

## 2023-11-01 DIAGNOSIS — I48.0 PAF (PAROXYSMAL ATRIAL FIBRILLATION) (HCC): Primary | ICD-10-CM

## 2023-11-01 NOTE — PATIENT INSTRUCTIONS
Please be informed that if you contact our office outside of normal business hours the physician on call cannot help with any scheduling or rescheduling issues, procedure instruction questions or any type of medication issue. We advise you for any urgent/emergency that you go to the nearest emergency room! PLEASE CALL OUR OFFICE DURING NORMAL BUSINESS HOURS    Monday - Friday   8 am to 5 pm    [place]: [address] [place]: [phone]    [place]:  [phone]    **It is YOUR responsibilty to bring medication bottles and/or updated medication list to [facility]. This will allow us to better serve you and all your healthcare needs**    Thank you for allowing us to care for you today! We want to ensure we can follow your treatment plan and we strive to give you the best outcomes and experience possible. If you ever have a life threatening emergency and call 911 - for an ambulance (EMS)   Our providers can only care for you at:   [facility] or [facility]. Even if you have someone take you or you drive yourself we can only care for you in a [facility] facility. Our providers are not setup at the other healthcare locations! [facility] Locations - No appointment necessary. Sites open Monday to Friday. Call your preferred location for test preparation, business   hours and other information you need. [employer]. [address] [place] [address]. [address] [email or web address]. [facility], [address] [facility]  Phone: [phone]       We are committed to providing you the best care possible. If you receive a survey after visiting one of our offices, please take time to share your experience concerning your physician office visit. These surveys are confidential and no health information about you is shared. We are eager to improve for you and we are counting on your feedback to help make that happen.

## 2023-11-01 NOTE — PROGRESS NOTES
Electrophysiology Consult Note      Reason for consultation:  atrial fibrillation    Chief complaint : Shortness of breath with exertion, palpitations, dizziness    Referring physician: No Garcia      Primary care physician: DEBORAH Garcia NP      History of Present Illness:     Patient is a 71-year-old female with a history of PAF, obesity, obstructive sleep apnea, hypertension, chronic kidney disease stage I, hypothyroidism referred by Dr. Lux Lowery for atrial fibrillation. Patient has shortness of breath with excess patient. Patient has palpitations and dizziness occasionally. Patient has edema of the feet at the end of the day but not during the morning. Patient denies dizziness or syncope. Patient does not smoke or drink. Patient does smoke cannabis daily. Patient reports she had atrial fibrillation episodes during the time she had COVID other than that she does not remember she had episodes which were noticed. Patient does have palpitations though. Pastmedical history:   Past Medical History:   Diagnosis Date    Acute kidney failure, unspecified ([address]) 2021    Diabetes mellitus ([address])     H/O 24 hour EKG monitoring 2019    Basic sinus rhythm Minimum HR 41 bpm APC noted brief SVT noted Pt reported symptoms of lightheadedness no arrhythmias noted. H/O echocardiogram 2019    EF 55-60%, Grade II DD, Mild MR, TR & PHTN. History of nuclear stress test 2019    normal study.     Hypertension     Hypopotassemia 2022    NELSON on CPAP     Thyroid disease        Surgical history :   Past Surgical History:   Procedure Laterality Date    ANKLE FRACTURE SURGERY       SECTION      COLONOSCOPY  2016    int hem, repeat in 10 years    OVARY REMOVAL         Family history:   Family History   Problem Relation Age of Onset    Arthritis Mother     Diabetes Mother     Hypertension Mother     Diabetes Father     Hypertension Father

## 2023-11-01 NOTE — PROGRESS NOTES
Placed all required pt info. into [employer] prior to monitor application. Applied 30 day event monitor @ 445, w/SN # [id number] Instructed pt on:  Placing electrodes properly, along w/color-coated wires per monitor booklet diagram; Do not remove monitor for first 48hrs, then can be removed for bathing; After bathing, to apply new electrodes in same noted area of skin, but in different spots as not to irritate nor break the skin down; How to record sx per [employer] cell phone while wearing monitor; Gave phone # to pt to call if have problems, concerns, &/to order new supplies if needed. Patient verbalized understanding of all given info. Event monitor SN & Kit# sticker attached to Epic order, along w/time started placed in designated area @ front nurse's station. Also application logged into monitor record log on computer.

## 2023-11-07 NOTE — TELEPHONE ENCOUNTER
Patient called again stating that she is out of Xarelto and needs to have it signed for today. She wants to know what the effects are from going without it. Please all her back at ph# [phone].

## 2023-11-17 ENCOUNTER — HOSPITAL ENCOUNTER (OUTPATIENT)
Age: 68
Discharge: HOME | End: 2023-11-17
Payer: COMMERCIAL

## 2023-11-17 LAB
ANION GAP SERPL CALCULATED.3IONS-SCNC: 12 MMOL/L (ref 4–16)
BUN SERPL-MCNC: 18 MG/DL (ref 6–23)
CALCIUM SERPL-MCNC: 10.1 MG/DL (ref 8.3–10.6)
CHLORIDE BLD-SCNC: 99 MMOL/L (ref 99–110)
CO2: 28 MMOL/L (ref 21–32)
CREAT SERPL-MCNC: 1 MG/DL (ref 0.6–1.1)
CREATININE URINE: 98.2 MG/DL (ref 28–217)
GFR SERPL CREATININE-BSD FRML MDRD: >60 ML/MIN/1.73M2
GLUCOSE SERPL-MCNC: 95 MG/DL (ref 70–99)
POTASSIUM SERPL-SCNC: 3.9 MMOL/L (ref 3.5–5.1)
PROT/CREAT RATIO, UR: 0.1
SODIUM BLD-SCNC: 139 MMOL/L (ref 135–145)
URINE TOTAL PROTEIN: 7.5 MG/DL

## 2023-11-17 PROCEDURE — 36415 COLL VENOUS BLD VENIPUNCTURE: CPT

## 2023-11-17 PROCEDURE — 84156 ASSAY OF PROTEIN URINE: CPT

## 2023-11-17 PROCEDURE — 80048 BASIC METABOLIC PNL TOTAL CA: CPT

## 2023-11-17 PROCEDURE — 82570 ASSAY OF URINE CREATININE: CPT

## 2023-11-21 PROBLEM — N17.9 AKI (ACUTE KIDNEY INJURY) (HCC): Status: ACTIVE | Noted: 2023-11-21

## 2023-11-26 ASSESSMENT — ENCOUNTER SYMPTOMS
PHOTOPHOBIA: 0
CHEST TIGHTNESS: 0
COLOR CHANGE: 0
NAUSEA: 0
ABDOMINAL PAIN: 0
EYE PAIN: 0
SHORTNESS OF BREATH: 1
DIARRHEA: 0
COUGH: 0
BLOOD IN STOOL: 0
VOMITING: 0
BACK PAIN: 0
CONSTIPATION: 0
WHEEZING: 0

## 2023-11-28 RX ORDER — RIVAROXABAN 20 MG/1
20 TABLET, FILM COATED ORAL
Qty: 30 TABLET | Refills: 1 | Status: SHIPPED | OUTPATIENT
Start: 2023-11-28 | End: 2024-01-11 | Stop reason: SDUPTHER

## 2023-12-05 ENCOUNTER — OFFICE VISIT (OUTPATIENT)
Dept: ORTHOPEDIC SURGERY | Age: 68
End: 2023-12-05
Payer: COMMERCIAL

## 2023-12-05 VITALS — OXYGEN SATURATION: 99 % | RESPIRATION RATE: 16 BRPM | HEART RATE: 56 BPM | TEMPERATURE: 98.1 F

## 2023-12-05 DIAGNOSIS — M17.11 PRIMARY OSTEOARTHRITIS OF RIGHT KNEE: Primary | ICD-10-CM

## 2023-12-05 PROCEDURE — 99214 OFFICE O/P EST MOD 30 MIN: CPT | Performed by: ORTHOPAEDIC SURGERY

## 2023-12-05 PROCEDURE — 1123F ACP DISCUSS/DSCN MKR DOCD: CPT | Performed by: ORTHOPAEDIC SURGERY

## 2023-12-05 ASSESSMENT — ENCOUNTER SYMPTOMS
COLOR CHANGE: 0
CHEST TIGHTNESS: 0
EYE PAIN: 0
WHEEZING: 0
EYE REDNESS: 0
SHORTNESS OF BREATH: 0
VOMITING: 0

## 2023-12-05 NOTE — PATIENT INSTRUCTIONS
We will schedule surgery at your convenience. If you have any questions regarding your surgery, please call our office and ask to speak with Sallie Smith [phone].

## 2023-12-05 NOTE — PROGRESS NOTES
12/5/2023     Chief complaint: Worsening right knee pain    History of Present Illness:                             Flor Mota is a 76 y.o. female who presents today for evaluation of her progressive worsening right knee pain. She has responded well to injections in the past but has noticed that the last injection was less effective than previous ones. She has trouble going up and down stairs and try to keep up with her exercise program.  Her knee is constant aching all day long through the medial joint line and also bothers her at night. She has trouble getting up from a seated position and going up and down stairs. She is becoming more sedentary because of the significance of the knee pain and dysfunction. Pain is most significant along the medial joint line and worse with direct pressure and weightbearing. Patient seen in office today for:  Right knee pain     Date of last injection: 9/19/2023     Patient reports 8/10 pain. RICE and medication are somewhat effective to alleviate pain and reduce swelling. Pain worsened by: Patient reports painful ROM and weight bearing     Patient interested in speaking to provider about surgical option. Medical History  Patient's medications, allergies, past medical, surgical, social and family histories were reviewed and updated as appropriate. Past Medical History:   Diagnosis Date    A-fib ([address]) 07/2023    Acute kidney failure, unspecified ([address]) 08/30/2021    Diabetes mellitus ([address])     H/O 24 hour EKG monitoring 11/06/2019    Basic sinus rhythm Minimum HR 41 bpm APC noted brief SVT noted Pt reported symptoms of lightheadedness no arrhythmias noted. H/O echocardiogram 11/29/2019    EF 55-60%, Grade II DD, Mild MR, TR & PHTN. History of nuclear stress test 11/25/2019    normal study.     Hypertension     Hypopotassemia 07/12/2022    NELSON on CPAP     Thyroid disease      Past Surgical History:   Procedure Laterality Date    ANKLE FRACTURE

## 2023-12-05 NOTE — PROGRESS NOTES
Patient seen in office today for:  Right knee pain    Date of last injection: 9/19/2023    Patient reports 4/10 pain. RICE and medication are somewhat effective to alleviate pain and reduce swelling. Pain worsened by: Patient reports painful ROM and weight bearing    Patient interested in speaking to provider about surgical option.

## 2023-12-07 DIAGNOSIS — M17.11 PRIMARY OSTEOARTHRITIS OF RIGHT KNEE: Primary | ICD-10-CM

## 2023-12-07 DIAGNOSIS — G89.29 CHRONIC PAIN OF RIGHT KNEE: ICD-10-CM

## 2023-12-07 DIAGNOSIS — M25.561 CHRONIC PAIN OF RIGHT KNEE: ICD-10-CM

## 2023-12-11 ENCOUNTER — TELEPHONE (OUTPATIENT)
Dept: FAMILY MEDICINE CLINIC | Age: 68
End: 2023-12-11

## 2023-12-11 ENCOUNTER — TELEPHONE (OUTPATIENT)
Dept: ORTHOPEDIC SURGERY | Age: 68
End: 2023-12-11

## 2023-12-11 DIAGNOSIS — I48.0 PAF (PAROXYSMAL ATRIAL FIBRILLATION) (HCC): Primary | ICD-10-CM

## 2023-12-11 NOTE — TELEPHONE ENCOUNTER
Scheduled patient for:    Right Total Knee Arthroplasty  CPT: 45020  ICD 10: M17.11; M25.561  Surgery Date: 1/29/2024  Surgeon: Ernie Gosselin: [facility]  Product: [place]  Anesthesia: Spinal /Nerve Block    Clearances sent to:  PCP: Kaushik Diaz  Cardiac: Harvey  Pulmonary: Ranginwala    Physical Therapy: Michelle Chávez created  CT of Rt knee for [employer]    Insurance: Jose Grover started on 12/8/23  No Prior Auth Required

## 2023-12-12 ENCOUNTER — TELEPHONE (OUTPATIENT)
Dept: CARDIOLOGY CLINIC | Age: 68
End: 2023-12-12

## 2023-12-12 NOTE — TELEPHONE ENCOUNTER
Please hold off on clearance given patient was recently evaluated by EP and may be having further procedures.

## 2023-12-12 NOTE — TELEPHONE ENCOUNTER
Cardiologist: Dr. Gabriel  Surgeon: Dr. Martínez  Surgery: Right total knee arthroplasty  Anesthesia: Spinal /nerve block  Date: 1/29/2024  FAX# [phone]  Ph# [phone]    Last OV 10/13/2023 w/Harvey    Tiredness: could be from sleep apnea, and could be from uncontrolled HTN, will increase losartan 75mg daily  PAF AND Palpitations,REFER TO EP for now continue xarelto, lopressor  off use cardizem 30mgm hold off on holter monitor, she had apple watch and tracks her heart rate for now.SHE HAS MILD MITRAL STENOSIS, WILL LET EP DECIDE IF SHE SHOULD CHANGE XARELTO TO COUMADIN  COVID 19: last week RECOVERED  Sleep apnea: agree with follow up with Pulmonary  Dyspnea on exertion:resolved, normal stress test and echo, both reviewed and discussed. HAS MILD MS  Chest pressure:normal stress test and echo, will not pursue it  Murmur: echo Iis nromal  OBESITY: RECOMMEND TO LOSE WEIGHT  Dyslipidemia: check lipids  HTN: stable, continue lopressor,OFF losartan to 75 mg daily, Avoid red meat, processed meat, and salt,start exercise, lose weight, increase fresh fruits, green vegetable and nuts        Last EKG- 11/1/2023      NM- 7/12/2023  Normal LV function.normal EDV   There is normal isotope uptake following exercise and at rest. There is no   evidence of exercise induced ischemia.   This is a normal study.      Echo- 6/30/2023   Left ventricular function is normal, EF 55-60%.   Grade II diastolic dysfunction.   Mild concentric left ventricular hypertrophy.   Mildly dilated left atrium.   Mild aortic insufficiency with PHT of 602 msec.   Mitral annular calcification is present.   Mild to moderate, mild tricuspid and pulmonic mitral regurgitation is   present.   Mild mitral stenosis with a mean pressure gradient of 4 mmHg, and a MVA of   1.65cm^2.   Mildly elevated pulmonary artery pressure, RVSP 36 mmHg.   Dilation of the ascending aorta (4.1cm).   No evidence of pericardial effusion.         Xarelto

## 2023-12-15 ENCOUNTER — OFFICE VISIT (OUTPATIENT)
Dept: CARDIOLOGY CLINIC | Age: 68
End: 2023-12-15

## 2023-12-15 VITALS
HEART RATE: 60 BPM | HEIGHT: 64 IN | DIASTOLIC BLOOD PRESSURE: 67 MMHG | SYSTOLIC BLOOD PRESSURE: 118 MMHG | BODY MASS INDEX: 39.44 KG/M2 | WEIGHT: 231 LBS

## 2023-12-15 DIAGNOSIS — I48.0 PAF (PAROXYSMAL ATRIAL FIBRILLATION) (HCC): Primary | ICD-10-CM

## 2023-12-15 NOTE — PROGRESS NOTES
Electrophysiology FU Note      Reason for consultation:  atrial fibrillation    Chief complaint : Shortness of breath with exertion, palpitations, dizziness    Referring physician: Abigail Scott      Primary care physician: DEBORAH Tobin NP      History of Present Illness: This visit (12/15/2023)      Chief Complaint   Patient presents with    Follow-up     Follow up event monitor. She states she has been having some a fib episodes which have corrected themselves. Every once a while she will feel a fluttering. Has fatigue. Denies shortness of breath, chest pain, dizziness or syncope             Previous visit:     Patient is a 63-year-old female with a history of PAF, obesity, obstructive sleep apnea, hypertension, chronic kidney disease stage I, hypothyroidism referred by Dr. Idalmis Villanueva for atrial fibrillation. Patient has shortness of breath with excess patient. Patient has palpitations and dizziness occasionally. Patient has edema of the feet at the end of the day but not during the morning. Patient denies dizziness or syncope. Patient does not smoke or drink. Patient does smoke cannabis daily. Patient reports she had atrial fibrillation episodes during the time she had COVID other than that she does not remember she had episodes which were noticed. Patient does have palpitations though. Pastmedical history:   Past Medical History:   Diagnosis Date    A-fib ([address]) 07/2023    Acute kidney failure, unspecified ([address]) 08/30/2021    Diabetes mellitus ([address])     H/O 24 hour EKG monitoring 11/06/2019    Basic sinus rhythm Minimum HR 41 bpm APC noted brief SVT noted Pt reported symptoms of lightheadedness no arrhythmias noted. H/O echocardiogram 11/29/2019    EF 55-60%, Grade II DD, Mild MR, TR & PHTN. History of nuclear stress test 11/25/2019    normal study.     Hypertension     Hypopotassemia 07/12/2022    NELSON on CPAP     Thyroid disease        Surgical

## 2023-12-29 DIAGNOSIS — M17.11 PRIMARY OSTEOARTHRITIS OF RIGHT KNEE: Primary | ICD-10-CM

## 2024-01-02 ENCOUNTER — TELEPHONE (OUTPATIENT)
Dept: CARDIOLOGY CLINIC | Age: 69
End: 2024-01-02

## 2024-01-02 ENCOUNTER — HOSPITAL ENCOUNTER (INPATIENT)
Age: 69
LOS: 3 days | Discharge: HOME | End: 2024-01-05
Attending: INTERNAL MEDICINE | Admitting: INTERNAL MEDICINE
Payer: COMMERCIAL

## 2024-01-02 PROBLEM — I48.91 ATRIAL FIBRILLATION (HCC): Status: ACTIVE | Noted: 2024-01-02

## 2024-01-02 LAB
ANION GAP SERPL CALCULATED.3IONS-SCNC: 10 MMOL/L (ref 7–16)
BUN SERPL-MCNC: 16 MG/DL (ref 6–23)
CALCIUM SERPL-MCNC: 9.9 MG/DL (ref 8.3–10.6)
CHLORIDE BLD-SCNC: 99 MMOL/L (ref 99–110)
CO2: 29 MMOL/L (ref 21–32)
CREAT SERPL-MCNC: 1.2 MG/DL (ref 0.6–1.1)
GFR SERPL CREATININE-BSD FRML MDRD: 49 ML/MIN/1.73M2
GLUCOSE SERPL-MCNC: 91 MG/DL (ref 70–99)
MAGNESIUM: 2 MG/DL (ref 1.8–2.4)
PHOSPHORUS: 3.3 MG/DL (ref 2.5–4.9)
POTASSIUM SERPL-SCNC: 3.9 MMOL/L (ref 3.5–5.1)
SODIUM BLD-SCNC: 138 MMOL/L (ref 135–145)

## 2024-01-02 PROCEDURE — 6370000000 HC RX 637 (ALT 250 FOR IP): Performed by: NURSE PRACTITIONER

## 2024-01-02 PROCEDURE — 83735 ASSAY OF MAGNESIUM: CPT

## 2024-01-02 PROCEDURE — 36415 COLL VENOUS BLD VENIPUNCTURE: CPT

## 2024-01-02 PROCEDURE — 84100 ASSAY OF PHOSPHORUS: CPT

## 2024-01-02 PROCEDURE — 93005 ELECTROCARDIOGRAM TRACING: CPT | Performed by: NURSE PRACTITIONER

## 2024-01-02 PROCEDURE — 80048 BASIC METABOLIC PNL TOTAL CA: CPT

## 2024-01-02 PROCEDURE — 2060000000 HC ICU INTERMEDIATE R&B

## 2024-01-02 PROCEDURE — 99222 1ST HOSP IP/OBS MODERATE 55: CPT | Performed by: NURSE PRACTITIONER

## 2024-01-02 RX ORDER — INDAPAMIDE 2.5 MG/1
2.5 TABLET ORAL DAILY
Status: DISCONTINUED | OUTPATIENT
Start: 2024-01-03 | End: 2024-01-05 | Stop reason: HOSPADM

## 2024-01-02 RX ORDER — AMLODIPINE BESYLATE 5 MG/1
5 TABLET ORAL DAILY
Status: DISCONTINUED | OUTPATIENT
Start: 2024-01-03 | End: 2024-01-05 | Stop reason: HOSPADM

## 2024-01-02 RX ORDER — LEVOTHYROXINE SODIUM 112 UG/1
112 TABLET ORAL DAILY
Status: DISCONTINUED | OUTPATIENT
Start: 2024-01-03 | End: 2024-01-05 | Stop reason: HOSPADM

## 2024-01-02 RX ORDER — SOTALOL HYDROCHLORIDE 80 MG/1
80 TABLET ORAL 2 TIMES DAILY
Status: DISCONTINUED | OUTPATIENT
Start: 2024-01-02 | End: 2024-01-03

## 2024-01-02 RX ADMIN — SOTALOL HYDROCHLORIDE 80 MG: 80 TABLET ORAL at 14:28

## 2024-01-02 RX ADMIN — SOTALOL HYDROCHLORIDE 80 MG: 80 TABLET ORAL at 21:11

## 2024-01-02 ASSESSMENT — ENCOUNTER SYMPTOMS
COUGH: 0
VOMITING: 0
DIARRHEA: 0
WHEEZING: 0
NAUSEA: 0
BACK PAIN: 0
BLOOD IN STOOL: 0
CHEST TIGHTNESS: 0
COLOR CHANGE: 0
ABDOMINAL PAIN: 0
PHOTOPHOBIA: 0
SHORTNESS OF BREATH: 0
EYE PAIN: 0
CONSTIPATION: 0

## 2024-01-02 ASSESSMENT — PAIN SCALES - GENERAL: PAINLEVEL_OUTOF10: 0

## 2024-01-02 NOTE — H&P
Electrophysiology History and Physical      Reason for consultation:  atrial fibrillation      Referring physician:       Primary care physician: Christine Powers APRN - NP      History of Present Illness:     Lashawn Duque is a 68 year old female with a history of PAF/flutter, hypercoagulable state due to PAF, obesity, sleep apnea, hypertension, chronic kidney disease, hypothyroidism.  She presents with a history of PAF  Patient was recently seen in the office and it was decided to proceed with sotalol monitoring therapy  Patient complains of intermittent episodes of fatigue and palpitations.  She denies aggravating or relieving factors.    Previous visit (12/15/2023)      Chief Complaint   Patient presents with    Follow-up     Follow up event monitor. She states she has been having some a fib episodes which have corrected themselves. Every once a while she will feel a fluttering. Has fatigue. Denies shortness of breath, chest pain, dizziness or syncope             Previous visit:     Patient is a 68-year-old female with a history of PAF, obesity, obstructive sleep apnea, hypertension, chronic kidney disease stage I, hypothyroidism referred by Dr. Gabriel for atrial fibrillation.  Patient has shortness of breath with excess patient.  Patient has palpitations and dizziness occasionally.  Patient has edema of the feet at the end of the day but not during the morning.  Patient denies dizziness or syncope.  Patient does not smoke or drink.  Patient does smoke cannabis daily.  Patient reports she had atrial fibrillation episodes during the time she had COVID other than that she does not remember she had episodes which were noticed.  Patient does have palpitations though.    Pastmedical history:   Past Medical History:   Diagnosis Date    A-fib ([facility]) 07/2023    Acute kidney failure, unspecified ([facility]) 08/30/2021    Diabetes mellitus (H[facility])     H/O 24 hour EKG monitoring

## 2024-01-02 NOTE — TELEPHONE ENCOUNTER
Called to advise patient that room # is 2110 for Direct admit for Sotalol loading today with Dr Rooney. Advised to check in at  upon arrival.

## 2024-01-03 LAB
EKG ATRIAL RATE: 54 BPM
EKG DIAGNOSIS: NORMAL
EKG P AXIS: 6 DEGREES
EKG P-R INTERVAL: 152 MS
EKG Q-T INTERVAL: 464 MS
EKG QRS DURATION: 88 MS
EKG QTC CALCULATION (BAZETT): 440 MS
EKG R AXIS: -48 DEGREES
EKG T AXIS: 13 DEGREES
EKG VENTRICULAR RATE: 54 BPM

## 2024-01-03 PROCEDURE — 99232 SBSQ HOSP IP/OBS MODERATE 35: CPT | Performed by: NURSE PRACTITIONER

## 2024-01-03 PROCEDURE — 6370000000 HC RX 637 (ALT 250 FOR IP): Performed by: NURSE PRACTITIONER

## 2024-01-03 PROCEDURE — 2060000000 HC ICU INTERMEDIATE R&B

## 2024-01-03 PROCEDURE — 93010 ELECTROCARDIOGRAM REPORT: CPT | Performed by: INTERNAL MEDICINE

## 2024-01-03 PROCEDURE — 94761 N-INVAS EAR/PLS OXIMETRY MLT: CPT

## 2024-01-03 PROCEDURE — 93005 ELECTROCARDIOGRAM TRACING: CPT | Performed by: NURSE PRACTITIONER

## 2024-01-03 RX ORDER — ACETAMINOPHEN 325 MG/1
650 TABLET ORAL EVERY 4 HOURS PRN
Status: DISCONTINUED | OUTPATIENT
Start: 2024-01-03 | End: 2024-01-05 | Stop reason: HOSPADM

## 2024-01-03 RX ORDER — SOTALOL HYDROCHLORIDE 80 MG/1
40 TABLET ORAL 2 TIMES DAILY
Status: DISCONTINUED | OUTPATIENT
Start: 2024-01-03 | End: 2024-01-03

## 2024-01-03 RX ORDER — SOTALOL HYDROCHLORIDE 80 MG/1
80 TABLET ORAL DAILY
Status: DISCONTINUED | OUTPATIENT
Start: 2024-01-03 | End: 2024-01-05 | Stop reason: HOSPADM

## 2024-01-03 RX ADMIN — ACETAMINOPHEN 650 MG: 325 TABLET ORAL at 20:24

## 2024-01-03 RX ADMIN — INDAPAMIDE 2.5 MG: 2.5 TABLET, FILM COATED ORAL at 09:31

## 2024-01-03 RX ADMIN — LEVOTHYROXINE SODIUM 112 MCG: 0.11 TABLET ORAL at 09:31

## 2024-01-03 RX ADMIN — SOTALOL HYDROCHLORIDE 80 MG: 80 TABLET ORAL at 09:32

## 2024-01-03 RX ADMIN — RIVAROXABAN 20 MG: 20 TABLET, FILM COATED ORAL at 09:32

## 2024-01-03 RX ADMIN — AMLODIPINE BESYLATE 5 MG: 5 TABLET ORAL at 09:31

## 2024-01-03 ASSESSMENT — PAIN SCALES - GENERAL
PAINLEVEL_OUTOF10: 0
PAINLEVEL_OUTOF10: 0
PAINLEVEL_OUTOF10: 3

## 2024-01-03 ASSESSMENT — PAIN DESCRIPTION - LOCATION: LOCATION: HEAD

## 2024-01-03 ASSESSMENT — PAIN DESCRIPTION - DESCRIPTORS: DESCRIPTORS: ACHING;DISCOMFORT

## 2024-01-03 ASSESSMENT — PAIN DESCRIPTION - ORIENTATION: ORIENTATION: ANTERIOR

## 2024-01-03 NOTE — PROGRESS NOTES
Admit Date:  2024    Admission diagnosis / Complaint: sotalol monitoring therapy    Subjective:  Ms. Duque is resting in bed. Denies cardiac complaints. States she had a slight headache..     Objective:   /78   Pulse 53   Temp 98 °F (36.7 °C) (Oral)   Resp (!) 8   Ht 1.626 m (5' 4\")   Wt 104.8 kg (231 lb)   LMP  (LMP Unknown)   SpO2 96%   BMI 39.65 kg/m²     Intake/Output Summary (Last 24 hours) at 1/3/2024 0855  Last data filed at 2024 1431  Gross per 24 hour   Intake 240 ml   Output --   Net 240 ml       TELEMETRY: Sinus    has a past medical history of A-fib (HCC), Acute kidney failure, unspecified (HCC), Diabetes mellitus (HCC), H/O 24 hour EKG monitoring, H/O echocardiogram, History of nuclear stress test, Hypertension, Hypopotassemia, NELSON on CPAP, and Thyroid disease.   has a past surgical history that includes  section; Ovary removal; Ankle fracture surgery; and Colonoscopy (2016).    Physical Exam:  General:  Awake, alert, NAD  Skin:  Warm and dry  Neck:  JVD not appreciated  Chest:  Clear to auscultation, respiration easy  Cardiovascular:  RRR S1S2  Abdomen:  Soft nontender  Extremities:  no edema    Medications:    sotalol  40 mg Oral BID    amLODIPine  5 mg Oral Daily    indapamide  2.5 mg Oral Daily    levothyroxine  112 mcg Oral Daily    rivaroxaban  20 mg Oral Daily with breakfast       Lab Data:  CBC: No results for input(s): \"WBC\", \"HGB\", \"HCT\", \"MCV\", \"PLT\" in the last 72 hours.  BMP:   Recent Labs     24  1147      K 3.9   CL 99   CO2 29   PHOS 3.3   BUN 16   CREATININE 1.2*         Assessment:    Sotalol Monitoring Therapy  PAF  Atrial flutter  Hypercoagulable state due to PAF  CKD  HTN    EKG obtained. QTc is within range to continue sotalol  Creatinine Clearance 53. It was calculated according to adjusted weight  Will decrease dose of Sotalol to 80 mg daily due to creatinine clearance  Patient will have an EKG 1 hour before each scheduled dose of

## 2024-01-03 NOTE — CARE COORDINATION
01/03/24 1438   Service Assessment   Patient Orientation Alert and Oriented   Cognition Alert   History Provided By Patient;Medical Record   Primary Caregiver Self   Support Systems Spouse/Significant Other;Children;Family Members   Patient's Healthcare Decision Maker is: Legal Next of Kin   PCP Verified by CM Yes   Prior Functional Level Independent in ADLs/IADLs   Current Functional Level Assistance with the following:;Bathing;Toileting;Mobility  (Hospital Policy)   Can patient return to prior living arrangement Yes   Ability to make needs known: Good   Family able to assist with home care needs: Yes   Financial Resources Medicare   Community Resources None   Social/Functional History   Lives With Spouse   Type of Home House   ADL Assistance Independent   Homemaking Assistance Independent   Homemaking Responsibilities Yes   Ambulation Assistance Independent   Transfer Assistance Independent   Active  Yes   Mode of Transportation Car   Occupation Retired     Met with patient for discharge planning. Spouse is present. They live at home; IPA. She has a PCP and insurance that assists with Rx when needed. Patient is here for Sotalol Therapy. Plans discharge to home; no needs.Stefanie Rees RN

## 2024-01-04 LAB
EKG ATRIAL RATE: 55 BPM
EKG ATRIAL RATE: 58 BPM
EKG ATRIAL RATE: 67 BPM
EKG DIAGNOSIS: NORMAL
EKG P AXIS: 14 DEGREES
EKG P AXIS: 19 DEGREES
EKG P AXIS: 22 DEGREES
EKG P-R INTERVAL: 156 MS
EKG P-R INTERVAL: 162 MS
EKG P-R INTERVAL: 166 MS
EKG Q-T INTERVAL: 456 MS
EKG Q-T INTERVAL: 482 MS
EKG Q-T INTERVAL: 482 MS
EKG QRS DURATION: 92 MS
EKG QRS DURATION: 92 MS
EKG QRS DURATION: 94 MS
EKG QTC CALCULATION (BAZETT): 461 MS
EKG QTC CALCULATION (BAZETT): 473 MS
EKG QTC CALCULATION (BAZETT): 481 MS
EKG R AXIS: -10 DEGREES
EKG R AXIS: -21 DEGREES
EKG R AXIS: -5 DEGREES
EKG T AXIS: 22 DEGREES
EKG T AXIS: 25 DEGREES
EKG T AXIS: 37 DEGREES
EKG VENTRICULAR RATE: 55 BPM
EKG VENTRICULAR RATE: 58 BPM
EKG VENTRICULAR RATE: 67 BPM

## 2024-01-04 PROCEDURE — 99232 SBSQ HOSP IP/OBS MODERATE 35: CPT | Performed by: NURSE PRACTITIONER

## 2024-01-04 PROCEDURE — 94761 N-INVAS EAR/PLS OXIMETRY MLT: CPT

## 2024-01-04 PROCEDURE — 93010 ELECTROCARDIOGRAM REPORT: CPT | Performed by: INTERNAL MEDICINE

## 2024-01-04 PROCEDURE — 6370000000 HC RX 637 (ALT 250 FOR IP): Performed by: NURSE PRACTITIONER

## 2024-01-04 PROCEDURE — 93005 ELECTROCARDIOGRAM TRACING: CPT | Performed by: NURSE PRACTITIONER

## 2024-01-04 PROCEDURE — 2060000000 HC ICU INTERMEDIATE R&B

## 2024-01-04 RX ADMIN — SOTALOL HYDROCHLORIDE 80 MG: 80 TABLET ORAL at 09:14

## 2024-01-04 RX ADMIN — INDAPAMIDE 2.5 MG: 2.5 TABLET, FILM COATED ORAL at 09:13

## 2024-01-04 RX ADMIN — RIVAROXABAN 20 MG: 20 TABLET, FILM COATED ORAL at 09:14

## 2024-01-04 RX ADMIN — LEVOTHYROXINE SODIUM 112 MCG: 0.11 TABLET ORAL at 05:30

## 2024-01-04 RX ADMIN — AMLODIPINE BESYLATE 5 MG: 5 TABLET ORAL at 09:15

## 2024-01-04 ASSESSMENT — PAIN SCALES - GENERAL: PAINLEVEL_OUTOF10: 0

## 2024-01-04 NOTE — PROGRESS NOTES
Admit Date:  2024    Admission diagnosis / Complaint: sotalol monitoring therapy    Subjective:  Ms. Duque is resting in bed. Denies cardiac complaints.   Objective:   BP (!) 105/59   Pulse 61   Temp 97.9 °F (36.6 °C) (Oral)   Resp 18   Ht 1.626 m (5' 4\")   Wt 104.8 kg (231 lb)   LMP  (LMP Unknown)   SpO2 97%   BMI 39.65 kg/m²     Intake/Output Summary (Last 24 hours) at 2024 0833  Last data filed at 1/3/2024 1000  Gross per 24 hour   Intake 300 ml   Output --   Net 300 ml       TELEMETRY: Sinus    has a past medical history of A-fib (HCC), Acute kidney failure, unspecified (HCC), Diabetes mellitus (HCC), H/O 24 hour EKG monitoring, H/O echocardiogram, History of nuclear stress test, Hypertension, Hypopotassemia, NELSON on CPAP, and Thyroid disease.   has a past surgical history that includes  section; Ovary removal; Ankle fracture surgery; and Colonoscopy (2016).    Physical Exam:  General:  Awake, alert, NAD  Skin:  Warm and dry  Neck:  JVD not appreciated  Chest:  Clear to auscultation, respiration easy  Cardiovascular:  RRR S1S2  Abdomen:  Soft nontender  Extremities:  no edema    Medications:    sotalol  80 mg Oral Daily    amLODIPine  5 mg Oral Daily    indapamide  2.5 mg Oral Daily    levothyroxine  112 mcg Oral Daily    rivaroxaban  20 mg Oral Daily with breakfast       Lab Data:  CBC: No results for input(s): \"WBC\", \"HGB\", \"HCT\", \"MCV\", \"PLT\" in the last 72 hours.  BMP:   Recent Labs     24  1147      K 3.9   CL 99   CO2 29   PHOS 3.3   BUN 16   CREATININE 1.2*         Assessment:    Sotalol Monitoring Therapy  PAF  Atrial flutter  Hypercoagulable state due to PAF  CKD  HTN    EKG obtained. QTc is within range to continue sotalol 80 mg daily  Creatinine Clearance 53. It was calculated according to adjusted weight  Patient will have an EKG 1 hour before each scheduled dose of Sotalol is to be given. The Sotalol will be dosed according to the QTc.     No afib or flutter

## 2024-01-05 VITALS
BODY MASS INDEX: 39.44 KG/M2 | TEMPERATURE: 97.8 F | HEART RATE: 68 BPM | WEIGHT: 231 LBS | HEIGHT: 64 IN | OXYGEN SATURATION: 98 % | SYSTOLIC BLOOD PRESSURE: 130 MMHG | DIASTOLIC BLOOD PRESSURE: 68 MMHG | RESPIRATION RATE: 14 BRPM

## 2024-01-05 LAB
EKG ATRIAL RATE: 64 BPM
EKG DIAGNOSIS: NORMAL
EKG P AXIS: 2 DEGREES
EKG P-R INTERVAL: 152 MS
EKG Q-T INTERVAL: 456 MS
EKG QRS DURATION: 92 MS
EKG QTC CALCULATION (BAZETT): 470 MS
EKG R AXIS: -21 DEGREES
EKG T AXIS: 30 DEGREES
EKG VENTRICULAR RATE: 64 BPM

## 2024-01-05 PROCEDURE — 99238 HOSP IP/OBS DSCHRG MGMT 30/<: CPT | Performed by: NURSE PRACTITIONER

## 2024-01-05 PROCEDURE — 6370000000 HC RX 637 (ALT 250 FOR IP): Performed by: NURSE PRACTITIONER

## 2024-01-05 PROCEDURE — 94761 N-INVAS EAR/PLS OXIMETRY MLT: CPT

## 2024-01-05 PROCEDURE — 93010 ELECTROCARDIOGRAM REPORT: CPT | Performed by: INTERNAL MEDICINE

## 2024-01-05 PROCEDURE — 93005 ELECTROCARDIOGRAM TRACING: CPT | Performed by: INTERNAL MEDICINE

## 2024-01-05 RX ORDER — SOTALOL HYDROCHLORIDE 80 MG/1
80 TABLET ORAL DAILY
Qty: 30 TABLET | Refills: 0 | Status: SHIPPED | OUTPATIENT
Start: 2024-01-05

## 2024-01-05 RX ADMIN — SOTALOL HYDROCHLORIDE 80 MG: 80 TABLET ORAL at 08:44

## 2024-01-05 RX ADMIN — LEVOTHYROXINE SODIUM 112 MCG: 0.11 TABLET ORAL at 06:44

## 2024-01-05 RX ADMIN — INDAPAMIDE 2.5 MG: 2.5 TABLET, FILM COATED ORAL at 08:44

## 2024-01-05 RX ADMIN — RIVAROXABAN 20 MG: 20 TABLET, FILM COATED ORAL at 08:44

## 2024-01-05 RX ADMIN — AMLODIPINE BESYLATE 5 MG: 5 TABLET ORAL at 08:44

## 2024-01-05 ASSESSMENT — PAIN SCALES - GENERAL: PAINLEVEL_OUTOF10: 0

## 2024-01-05 NOTE — DISCHARGE SUMMARY
[facility]  Discharge Summary    Lashawn Duque  :  1955  MRN:  [id number]    Admit date:  2024  Discharge date:      Admitting Physician:  Mariano Rooney MD    Discharge Diagnoses:      Sotalol monitoring therapy  PAF  Hypercoagulable state due to PAF      Admission Condition:  fair    Discharged Condition:  good    Hospital Course:    Patient has a history of paroxysmal atrial fibrillation. He presented for Sotalol Monitoring Therapy.  An EKG was obtained 1 hour before each scheduled dose of Sotalol was to be given. The QTc was then called to the physician. The Sotalol was dose according to the QTc. After 72 hours the patient is stable for discharge.      Current Discharge Medication List             Details   sotalol (BETAPACE) 80 MG tablet Take 1 tablet by mouth daily  Qty: 30 tablet, Refills: 0                Details   XARELTO 20 MG TABS tablet Take 1 tablet by mouth once daily with breakfast  Qty: 30 tablet, Refills: 1      levothyroxine (SYNTHROID) 112 MCG tablet Take 1 tablet by mouth once daily  Qty: 90 tablet, Refills: 3    Associated Diagnoses: Acquired hypothyroidism      amLODIPine (NORVASC) 5 MG tablet Take 1 tablet by mouth daily  Qty: 90 tablet, Refills: 3      indapamide (LOZOL) 2.5 MG tablet TAKE 1 TAB BY MOUTH IN THE MORNING  Qty: 90 tablet, Refills: 3    Associated Diagnoses: Essential hypertension             Consults:  none    Discharge Exam:  /68   Pulse 58   Temp 98 °F (36.7 °C) (Oral)   Resp 14   Ht 1.626 m (5' 4\")   Wt 104.8 kg (231 lb)   LMP  (LMP Unknown)   SpO2 96%   BMI 39.65 kg/m²   General appearance: alert, appears stated age, and cooperative  Head: Normocephalic, without obvious abnormality, atraumatic  Lungs: clear to auscultation bilaterally  Heart: regular rate and rhythm, S1, S2 normal, no murmur, click, rub or gallop  Extremities: extremities normal, atraumatic, no cyanosis or edema  Pulses: 2+ and symmetric  Skin: Skin color, texture, turgor normal.

## 2024-01-05 NOTE — PROGRESS NOTES
Outpatient Pharmacy Progress Note for Meds-to-Beds    Total number of Prescriptions Filled: 1  The following medications were dispensed to the patient during the discharge process:  sotalol    Additional Documentation:  Patient picked-up the medication(s) in the OP Pharmacy      Thank you for letting [place] [address]    Phone: [phone]    Fax: [phone]

## 2024-01-05 NOTE — PROGRESS NOTES
Pt meets all criteria for discharge at this time, reviewed in detail all discharge instructions, medications, and follow-up appointments. Stressed importance of compliance with medications. Phone numbers given to call for any questions. Pt and family asking appropriate questions and all questions were answered to their satisfaction. Verbalize understanding and agreeable to discharge. All prescriptions received by patient. IV removed without difficulty. All belongings taken with patient at discharge.  to  patient.    Electronically signed by Roldan Connors RN on 1/5/2024 at 10:09 AM

## 2024-01-05 NOTE — PROGRESS NOTES
01/05/24 1008   Encounter Summary   Encounter Overview/Reason  Initial Encounter   Service Provided For: Patient   Referral/Consult From: Patient   Support System Spouse   Last Encounter  01/05/24  (Patient is going home today, she said. Patient said that she was feeling really good. Patient did not express any needs at this time.)   Complexity of Encounter Low   Begin Time 1000   End Time  1011   Total Time Calculated 11 min   Spiritual/Emotional needs   Type Spiritual Support   Grief, Loss, and Adjustments   Type Adjustment to illness   Assessment/Intervention/Outcome   Assessment Calm;Hopeful;Peaceful   Intervention Active listening;Empowerment;Sustaining Presence/Ministry of presence   Outcome Encouraged;Expressed Gratitude;Engaged in conversation;Expressed feelings, needs, and concerns;Optimistic   Plan and Referrals   Plan/Referrals No future visits requested  (patient is going home today)

## 2024-01-05 NOTE — PLAN OF CARE
Problem: Discharge Planning  Goal: Discharge to home or other facility with appropriate resources  Outcome: Progressing     Problem: Pain  Goal: Verbalizes/displays adequate comfort level or baseline comfort level  Outcome: Progressing     Problem: Chronic Conditions and Co-morbidities  Goal: Patient's chronic conditions and co-morbidity symptoms are monitored and maintained or improved  Outcome: Progressing      Vaccine status unknown

## 2024-01-07 ENCOUNTER — HOSPITAL ENCOUNTER (EMERGENCY)
Age: 69
Discharge: HOME | End: 2024-01-07
Attending: EMERGENCY MEDICINE
Payer: COMMERCIAL

## 2024-01-07 ENCOUNTER — APPOINTMENT (OUTPATIENT)
Dept: GENERAL RADIOLOGY | Age: 69
End: 2024-01-07
Payer: COMMERCIAL

## 2024-01-07 VITALS
HEIGHT: 64 IN | WEIGHT: 225 LBS | HEART RATE: 64 BPM | DIASTOLIC BLOOD PRESSURE: 83 MMHG | TEMPERATURE: 97.5 F | SYSTOLIC BLOOD PRESSURE: 141 MMHG | BODY MASS INDEX: 38.41 KG/M2 | OXYGEN SATURATION: 97 % | RESPIRATION RATE: 12 BRPM

## 2024-01-07 DIAGNOSIS — Z86.79 HISTORY OF ATRIAL FIBRILLATION: ICD-10-CM

## 2024-01-07 DIAGNOSIS — R07.9 CHEST PAIN, UNSPECIFIED TYPE: Primary | ICD-10-CM

## 2024-01-07 DIAGNOSIS — R00.2 PALPITATIONS: ICD-10-CM

## 2024-01-07 DIAGNOSIS — E87.6 HYPOKALEMIA: ICD-10-CM

## 2024-01-07 LAB
ALBUMIN SERPL-MCNC: 4.3 GM/DL (ref 3.4–5)
ALP BLD-CCNC: 70 IU/L (ref 40–128)
ALT SERPL-CCNC: 14 U/L (ref 10–40)
ANION GAP SERPL CALCULATED.3IONS-SCNC: 11 MMOL/L (ref 7–16)
AST SERPL-CCNC: 15 IU/L (ref 15–37)
BASOPHILS ABSOLUTE: 0 K/CU MM
BASOPHILS RELATIVE PERCENT: 0.5 % (ref 0–1)
BILIRUB SERPL-MCNC: 0.4 MG/DL (ref 0–1)
BUN SERPL-MCNC: 21 MG/DL (ref 6–23)
CALCIUM SERPL-MCNC: 9.3 MG/DL (ref 8.3–10.6)
CHLORIDE BLD-SCNC: 97 MMOL/L (ref 99–110)
CO2: 27 MMOL/L (ref 21–32)
CREAT SERPL-MCNC: 1.1 MG/DL (ref 0.6–1.1)
DIFFERENTIAL TYPE: ABNORMAL
EKG ATRIAL RATE: 85 BPM
EKG DIAGNOSIS: NORMAL
EKG P AXIS: 5 DEGREES
EKG P-R INTERVAL: 136 MS
EKG Q-T INTERVAL: 394 MS
EKG QRS DURATION: 88 MS
EKG QTC CALCULATION (BAZETT): 468 MS
EKG R AXIS: -4 DEGREES
EKG T AXIS: 42 DEGREES
EKG VENTRICULAR RATE: 85 BPM
EOSINOPHILS ABSOLUTE: 0.2 K/CU MM
EOSINOPHILS RELATIVE PERCENT: 1.8 % (ref 0–3)
GFR SERPL CREATININE-BSD FRML MDRD: 55 ML/MIN/1.73M2
GLUCOSE SERPL-MCNC: 118 MG/DL (ref 70–99)
HCT VFR BLD CALC: 41.9 % (ref 37–47)
HEMOGLOBIN: 14 GM/DL (ref 12.5–16)
IMMATURE NEUTROPHIL %: 0.3 % (ref 0–0.43)
LIPASE: 33 IU/L (ref 13–60)
LYMPHOCYTES ABSOLUTE: 1.9 K/CU MM
LYMPHOCYTES RELATIVE PERCENT: 22.3 % (ref 24–44)
MAGNESIUM: 2.2 MG/DL (ref 1.8–2.4)
MCH RBC QN AUTO: 29.9 PG (ref 27–31)
MCHC RBC AUTO-ENTMCNC: 33.4 % (ref 32–36)
MCV RBC AUTO: 89.5 FL (ref 78–100)
MONOCYTES ABSOLUTE: 0.5 K/CU MM
MONOCYTES RELATIVE PERCENT: 5.2 % (ref 0–4)
NUCLEATED RBC %: 0 %
PDW BLD-RTO: 12.9 % (ref 11.7–14.9)
PLATELET # BLD: 267 K/CU MM (ref 140–440)
PMV BLD AUTO: 11.4 FL (ref 7.5–11.1)
POTASSIUM SERPL-SCNC: 3.3 MMOL/L (ref 3.5–5.1)
PRO-BNP: 193.8 PG/ML
RBC # BLD: 4.68 M/CU MM (ref 4.2–5.4)
SEGMENTED NEUTROPHILS ABSOLUTE COUNT: 6.1 K/CU MM
SEGMENTED NEUTROPHILS RELATIVE PERCENT: 69.9 % (ref 36–66)
SODIUM BLD-SCNC: 135 MMOL/L (ref 135–145)
TOTAL IMMATURE NEUTOROPHIL: 0.03 K/CU MM
TOTAL NUCLEATED RBC: 0 K/CU MM
TOTAL PROTEIN: 6.9 GM/DL (ref 6.4–8.2)
TROPONIN, HIGH SENSITIVITY: 10 NG/L (ref 0–14)
TROPONIN, HIGH SENSITIVITY: 11 NG/L (ref 0–14)
WBC # BLD: 8.7 K/CU MM (ref 4–10.5)

## 2024-01-07 PROCEDURE — 93010 ELECTROCARDIOGRAM REPORT: CPT | Performed by: INTERNAL MEDICINE

## 2024-01-07 PROCEDURE — 83880 ASSAY OF NATRIURETIC PEPTIDE: CPT

## 2024-01-07 PROCEDURE — 83735 ASSAY OF MAGNESIUM: CPT

## 2024-01-07 PROCEDURE — 84484 ASSAY OF TROPONIN QUANT: CPT

## 2024-01-07 PROCEDURE — 99285 EMERGENCY DEPT VISIT HI MDM: CPT

## 2024-01-07 PROCEDURE — 6370000000 HC RX 637 (ALT 250 FOR IP): Performed by: EMERGENCY MEDICINE

## 2024-01-07 PROCEDURE — 85025 COMPLETE CBC W/AUTO DIFF WBC: CPT

## 2024-01-07 PROCEDURE — 83690 ASSAY OF LIPASE: CPT

## 2024-01-07 PROCEDURE — 80053 COMPREHEN METABOLIC PANEL: CPT

## 2024-01-07 PROCEDURE — 71045 X-RAY EXAM CHEST 1 VIEW: CPT

## 2024-01-07 PROCEDURE — 93005 ELECTROCARDIOGRAM TRACING: CPT | Performed by: EMERGENCY MEDICINE

## 2024-01-07 RX ORDER — POTASSIUM CHLORIDE 20 MEQ/1
40 TABLET, EXTENDED RELEASE ORAL ONCE
Status: COMPLETED | OUTPATIENT
Start: 2024-01-07 | End: 2024-01-07

## 2024-01-07 RX ORDER — POTASSIUM CHLORIDE 20 MEQ/1
20 TABLET, EXTENDED RELEASE ORAL DAILY
Qty: 7 TABLET | Refills: 0 | Status: SHIPPED | OUTPATIENT
Start: 2024-01-07 | End: 2024-01-14

## 2024-01-07 RX ADMIN — POTASSIUM CHLORIDE 40 MEQ: 1500 TABLET, EXTENDED RELEASE ORAL at 10:28

## 2024-01-07 ASSESSMENT — HEART SCORE: ECG: 0

## 2024-01-07 NOTE — ED PROVIDER NOTES
elevated heart rate throughout ED course.  Discussed with patient we will discharge to home with potassium 20 mg p.o. daily for 7 days.  She is to follow-up her primary care physician Dr. Stringer in 2 days for reevaluation of potassium.  Patient is to follow-up with her cardiologist Dr. Rooney on Thursday as scheduled for reevaluation of palpitations A-fib chest tightness.  Patient voiced understanding to the above treatment plan.  Patient is to return immediately for any worsening symptoms.  All questions answered.  Patient discharged in stable condition.    Clinical Impression:  1. Chest pain, unspecified type    2. Palpitations    3. Hypokalemia    4. History of atrial fibrillation      Disposition referral (if applicable):  Christine Powers APRN - NP  [address] [place] [address]  [phone]    Schedule an appointment as soon as possible for a visit in 2 days  If symptoms worsen and for re-evaluation of low potassium. call for an appointment    Mariano Rooney MD  [address] [address]  [phone]    Schedule an appointment as soon as possible for a visit in 2 days  for evaluation of palpatations, atrial fibrillation. keep appointment for thursday.    [facility] Emergency Department  [address]  [place] [address]  [phone]  Go in 1 day  If symptoms worsen    Disposition medications (if applicable):  New Prescriptions    POTASSIUM CHLORIDE (KLOR-CON M) 20 MEQ EXTENDED RELEASE TABLET    Take 1 tablet by mouth daily for 7 days     ED Provider Disposition Time  DISPOSITION  10:10 AM        Comment: Please note this report has been produced using speech recognition software and may contain errors related to that system including errors in grammar, punctuation, and spelling, as well as words and phrases that may be inappropriate.  Efforts were made to edit the dictations.        Miri Teague DO  01/07/24

## 2024-01-07 NOTE — ED TRIAGE NOTES
Patient presents to the ED with complaints of chest pain and possible afib. Patient states it woke her up out of her sleep tonight. Patient states her smart watch says her heart rate is going between 60 and 150bpm.

## 2024-01-07 NOTE — ED NOTES
Patient discharged to home at this time. Discharge instructions and follow up care discussed, patient voices understanding.

## 2024-01-07 NOTE — DISCHARGE INSTRUCTIONS
Follow-up with cardiologist in in 4 days as scheduled for reevaluation.  Keep scheduled appointment  Follow-up with primary care physician for reevaluation of potassium.  Call for an appointment  Take potassium p.o. daily for 7 days  Return to the emergency department immediately any pain fever chills nausea vomiting dizzy lightheadedness increased heart rate or any worsening symptoms.

## 2024-01-08 ENCOUNTER — TELEPHONE (OUTPATIENT)
Dept: FAMILY MEDICINE CLINIC | Age: 69
End: 2024-01-08

## 2024-01-08 NOTE — TELEPHONE ENCOUNTER
Care Transitions Initial Follow Up Call    Call within 2 business days of discharge: Yes     Patient: Lashawn Duque Patient : 1955 MRN: [id number]    [unfilled]    RARS: Readmission Risk Score: 10.3       Spoke with: N/A    Discharge department/facility: [facility]    Non-face-to-face services provided:  N/A    Follow Up  Future Appointments   Date Time Provider Department Center   2024 10:30 AM Comfort Parmar APRN - CNP [facility]   1/15/2024 10:00 AM GAURAV Batista, PT MHUZ SPT PT [place] [facility]   1/15/2024 11:00 AM Rosio Page LPN [facility] SPM [facility]   2024  1:15 PM Erasmo Guillory MD AFL [facility]   2024 10:00 AM SCHEDULE, SRMZ TERRI SRMZ PAT [place]   2024  4:30 PM Christine Powers APRN - NP [facility]   2024  4:10 PM Minnie Gabriel MD [facility]   2024  9:30 AM Christine Powers APRN - NP [facility]   2024 10:00 AM Denver Bhatti PA-C [facility] ORTHO [facility]   3/19/2024  1:50 PM Yan Martínez MD [facility] ORTH[facility]   2024  1:40 PM Denver Bhatti PA-C [facility] ORTHO [facility]   2024  9:00 AM Rocky Fisher MD AFL ADL NEPH [facility]     Rory Rudolph MA  UNABLE TO LEAVE  . PATIENT FOLLOWS UP WITH SPECIALIST PER PCP SHE IS OK TO NOT FOLLOW UP IN OFFICE .

## 2024-01-11 ENCOUNTER — OFFICE VISIT (OUTPATIENT)
Dept: CARDIOLOGY CLINIC | Age: 69
End: 2024-01-11

## 2024-01-11 VITALS
HEART RATE: 65 BPM | BODY MASS INDEX: 38.55 KG/M2 | HEIGHT: 64 IN | DIASTOLIC BLOOD PRESSURE: 76 MMHG | WEIGHT: 225.8 LBS | SYSTOLIC BLOOD PRESSURE: 118 MMHG

## 2024-01-11 DIAGNOSIS — E87.6 HYPOKALEMIA: ICD-10-CM

## 2024-01-11 DIAGNOSIS — E66.01 SEVERE OBESITY (BMI 35.0-39.9) WITH COMORBIDITY (HCC): ICD-10-CM

## 2024-01-11 DIAGNOSIS — Z51.81 ENCOUNTER FOR MONITORING SOTALOL THERAPY: Primary | Chronic | ICD-10-CM

## 2024-01-11 DIAGNOSIS — Z79.899 ENCOUNTER FOR MONITORING SOTALOL THERAPY: Primary | Chronic | ICD-10-CM

## 2024-01-11 DIAGNOSIS — I48.0 PAF (PAROXYSMAL ATRIAL FIBRILLATION) (HCC): ICD-10-CM

## 2024-01-11 DIAGNOSIS — G47.33 OSA ON CPAP: ICD-10-CM

## 2024-01-11 DIAGNOSIS — I10 ESSENTIAL HYPERTENSION: ICD-10-CM

## 2024-01-11 DIAGNOSIS — I48.0 HYPERCOAGULABLE STATE DUE TO PAROXYSMAL ATRIAL FIBRILLATION (HCC): ICD-10-CM

## 2024-01-11 DIAGNOSIS — D68.69 HYPERCOAGULABLE STATE DUE TO PAROXYSMAL ATRIAL FIBRILLATION (HCC): ICD-10-CM

## 2024-01-11 RX ORDER — SOTALOL HYDROCHLORIDE 80 MG/1
80 TABLET ORAL DAILY
Qty: 90 TABLET | Refills: 0 | Status: SHIPPED | OUTPATIENT
Start: 2024-01-11 | End: 2024-04-10

## 2024-01-11 NOTE — PATIENT INSTRUCTIONS
[facility]    We are committed to providing you the best care possible.    If you receive a survey after visiting one of our offices, please take time to share your experience concerning your physician office visit.  These surveys are confidential and no health information about you is shared.    We are eager to improve for you and we are counting on your feedback to help make that happen.      **It is YOUR responsibilty to bring medication bottles and/or updated medication list to EACH APPOINTMENT. This will allow us to better serve you and all your healthcare needs**    Thank you for allowing us to care for you today!   We want to ensure we can follow your treatment plan and we strive to give you the best outcomes and experience possible.   If you ever have a life threatening emergency and call 911 - for an ambulance (EMS)   Our providers can only care for you at:   [facility] or [facility].   Even if you have someone take you or you drive yourself we can only care for you in a [facility] facility. Our providers are not setup at the other healthcare locations!     Please be informed that if you contact our office outside of normal business hours the physician on call cannot help with any scheduling or rescheduling issues, procedure instruction questions or any type of medication issue.    We advise you for any urgent/emergency that you go to the nearest emergency room!    PLEASE CALL OUR OFFICE DURING NORMAL BUSINESS HOURS    Monday - Friday   8 am to 5 pm    [place]: [phone]    [place]: [phone]    [place]:  [phone]

## 2024-01-12 NOTE — PROGRESS NOTES
1/12/24 - .Reminded of pre-testing on 1/17/24 @0900.   Bring insurance card, picture ID and a list of your home medications. Check in at the information desk in the lobby upon your arrival. You can eat and take morning medications. Patient verbalized understanding.

## 2024-01-13 PROBLEM — I48.0 PAF (PAROXYSMAL ATRIAL FIBRILLATION) (HCC): Status: ACTIVE | Noted: 2024-01-02

## 2024-01-13 PROBLEM — I48.0 HYPERCOAGULABLE STATE DUE TO PAROXYSMAL ATRIAL FIBRILLATION (HCC): Status: ACTIVE | Noted: 2023-10-13

## 2024-01-13 ASSESSMENT — ENCOUNTER SYMPTOMS
SINUS PRESSURE: 0
SINUS PAIN: 0
ABDOMINAL PAIN: 0
ABDOMINAL DISTENTION: 0
COUGH: 0
COLOR CHANGE: 0
BLOOD IN STOOL: 0
SHORTNESS OF BREATH: 0
PHOTOPHOBIA: 0

## 2024-01-15 ENCOUNTER — TELEPHONE (OUTPATIENT)
Dept: CARDIOLOGY CLINIC | Age: 69
End: 2024-01-15

## 2024-01-15 ENCOUNTER — NURSE ONLY (OUTPATIENT)
Dept: ORTHOPEDIC SURGERY | Age: 69
End: 2024-01-15

## 2024-01-15 ENCOUNTER — HOSPITAL ENCOUNTER (OUTPATIENT)
Age: 69
Discharge: HOME | End: 2024-01-15
Payer: COMMERCIAL

## 2024-01-15 DIAGNOSIS — E87.6 HYPOKALEMIA: ICD-10-CM

## 2024-01-15 LAB — POTASSIUM SERPL-SCNC: 3.7 MMOL/L (ref 3.5–5.1)

## 2024-01-15 PROCEDURE — 36415 COLL VENOUS BLD VENIPUNCTURE: CPT

## 2024-01-15 PROCEDURE — 84132 ASSAY OF SERUM POTASSIUM: CPT

## 2024-01-15 NOTE — TELEPHONE ENCOUNTER
----- Message from DEBORAH Renner CNP sent at 1/15/2024  1:16 PM EST -----  Please notify patient that potassium level is 3.7. this is normal

## 2024-01-15 NOTE — PROGRESS NOTES
PARVEEN Right TKA DOS 1/29/2024    Patient's questions were answered at this visit and patient was instructed to call this nurse with any further questions or concerns at direct number.     Patient would like to discharge from hospital with home health PT and then graduate to out patient PT.     Patient reported having an abnormality with vocal cords and/or trachea making it hard for her to be intubated or recovery from intubation.     Patient will  her handicap placard in [place] office tomorrow, 1/16/2024.      CT scan scheduled for 1/16/2024

## 2024-01-16 ENCOUNTER — HOSPITAL ENCOUNTER (OUTPATIENT)
Dept: PHYSICAL THERAPY | Age: 69
Setting detail: THERAPIES SERIES
Discharge: HOME | End: 2024-01-16
Attending: ORTHOPAEDIC SURGERY
Payer: COMMERCIAL

## 2024-01-16 ENCOUNTER — HOSPITAL ENCOUNTER (OUTPATIENT)
Dept: CT IMAGING | Age: 69
Discharge: HOME | End: 2024-01-16
Attending: ORTHOPAEDIC SURGERY
Payer: COMMERCIAL

## 2024-01-16 DIAGNOSIS — M25.561 CHRONIC PAIN OF RIGHT KNEE: ICD-10-CM

## 2024-01-16 DIAGNOSIS — G89.29 CHRONIC PAIN OF RIGHT KNEE: ICD-10-CM

## 2024-01-16 DIAGNOSIS — M17.11 PRIMARY OSTEOARTHRITIS OF RIGHT KNEE: ICD-10-CM

## 2024-01-16 PROCEDURE — 97110 THERAPEUTIC EXERCISES: CPT

## 2024-01-16 PROCEDURE — 97162 PT EVAL MOD COMPLEX 30 MIN: CPT

## 2024-01-16 PROCEDURE — 73700 CT LOWER EXTREMITY W/O DYE: CPT

## 2024-01-16 NOTE — PLAN OF CARE
Outpatient Physical Therapy           [place]           [] Phone: [phone]   Fax: [phone]  [place]           [x] Phone: [phone]   Fax: [phone]     To: Yan Martínez MD    From: GAURAV Batista PT,    Patient: Lashawn Duque       : 1955  Diagnosis: Primary osteoarthritis of right knee [M17.11]  Chronic pain of right knee [M25.561, G89.29] Diagnosis: R knee OA  Treatment Diagnosis:    Date: 2024    Physical Therapy Certification/Re-Certification Form    The following patient has been evaluated for physical therapy services and for therapy to continue, insurance requires physician review of the treatment plan initially and every 90 days. Please review the attached evaluation and/or summary of the patient's plan of care, and verify that you agree therapy should continue by signing the attached document and sending it back to our office.    Assessment:  to have R TKA 24- [facility] - no prior knee surgeries      Plan of Care/Treatment to date:  [x] Therapeutic Exercise  [] Modalities:  [] Therapeutic Activity     [] Ultrasound  [] Electrical Stimulation  [x] Gait Training      [] Cervical Traction [] Lumbar Traction  [] Neuromuscular Re-education    [] Cold/hotpack [] Iontophoresis   [x] Instruction in HEP      [] Vasopneumatic    [] Dry Needling  [] Manual Therapy               [] Aquatic Therapy       Other:          Frequency/Duration:  # Days per week: [x] 1 day # Weeks: [x] 1 week [] 5 weeks     [] 2 days   [] 2 weeks [] 6 weeks     [] 3 days   [] 3 weeks [] 7 weeks     [] 4 days   [] 4 weeks [] 8 weeks         [] 9 weeks [] 10 weeks         [] 11 weeks [] 12 weeks  Rehab Potential/Progress: [] Excellent [x] Good [] Fair  [] Poor   Goals:    Patient goals:  to have TKA  Short term goals  :  Short term goal 1: Patient will verbalize  and demonstrate understanding of initial post op phase exercises  Short term goal 2 : Patient will demonstrate understanding of proper use of walker

## 2024-01-17 ENCOUNTER — HOSPITAL ENCOUNTER (OUTPATIENT)
Dept: PREADMISSION TESTING | Age: 69
Discharge: HOME | End: 2024-01-17
Payer: COMMERCIAL

## 2024-01-17 ENCOUNTER — HOSPITAL ENCOUNTER (OUTPATIENT)
Dept: GENERAL RADIOLOGY | Age: 69
Discharge: HOME | End: 2024-01-17
Payer: COMMERCIAL

## 2024-01-17 ENCOUNTER — HOSPITAL ENCOUNTER (OUTPATIENT)
Age: 69
Discharge: HOME | End: 2024-01-17
Payer: COMMERCIAL

## 2024-01-17 VITALS
WEIGHT: 222 LBS | SYSTOLIC BLOOD PRESSURE: 136 MMHG | RESPIRATION RATE: 20 BRPM | TEMPERATURE: 97.5 F | HEIGHT: 64 IN | HEART RATE: 67 BPM | DIASTOLIC BLOOD PRESSURE: 67 MMHG | BODY MASS INDEX: 37.9 KG/M2

## 2024-01-17 DIAGNOSIS — Z01.818 PRE-OP EVALUATION: ICD-10-CM

## 2024-01-17 DIAGNOSIS — Z01.818 PRE-OP TESTING: Primary | ICD-10-CM

## 2024-01-17 LAB
ALBUMIN SERPL-MCNC: 4.7 GM/DL (ref 3.4–5)
ALP BLD-CCNC: 77 IU/L (ref 40–128)
ALT SERPL-CCNC: 14 U/L (ref 10–40)
ANION GAP SERPL CALCULATED.3IONS-SCNC: 14 MMOL/L (ref 7–16)
AST SERPL-CCNC: 13 IU/L (ref 15–37)
BACTERIA: NEGATIVE /HPF
BASOPHILS ABSOLUTE: 0 K/CU MM
BASOPHILS RELATIVE PERCENT: 0.4 % (ref 0–1)
BILIRUB SERPL-MCNC: 0.5 MG/DL (ref 0–1)
BILIRUBIN URINE: NEGATIVE MG/DL
BLOOD, URINE: NEGATIVE
BUN SERPL-MCNC: 16 MG/DL (ref 6–23)
CALCIUM SERPL-MCNC: 10 MG/DL (ref 8.3–10.6)
CHLORIDE BLD-SCNC: 98 MMOL/L (ref 99–110)
CLARITY: CLEAR
CO2: 25 MMOL/L (ref 21–32)
COLOR: YELLOW
CREAT SERPL-MCNC: 1.1 MG/DL (ref 0.6–1.1)
DIFFERENTIAL TYPE: ABNORMAL
EOSINOPHILS ABSOLUTE: 0.1 K/CU MM
EOSINOPHILS RELATIVE PERCENT: 1.8 % (ref 0–3)
ERYTHROCYTE SEDIMENTATION RATE: 12 MM/HR (ref 0–30)
GFR SERPL CREATININE-BSD FRML MDRD: 55 ML/MIN/1.73M2
GLUCOSE SERPL-MCNC: 106 MG/DL (ref 70–99)
GLUCOSE, URINE: NEGATIVE MG/DL
HCT VFR BLD CALC: 41.8 % (ref 37–47)
HEMOGLOBIN: 14.2 GM/DL (ref 12.5–16)
IMMATURE NEUTROPHIL %: 0.3 % (ref 0–0.43)
KETONES, URINE: NEGATIVE MG/DL
LEUKOCYTE ESTERASE, URINE: ABNORMAL
LYMPHOCYTES ABSOLUTE: 1.6 K/CU MM
LYMPHOCYTES RELATIVE PERCENT: 22.2 % (ref 24–44)
MCH RBC QN AUTO: 30.1 PG (ref 27–31)
MCHC RBC AUTO-ENTMCNC: 34 % (ref 32–36)
MCV RBC AUTO: 88.7 FL (ref 78–100)
MONOCYTES ABSOLUTE: 0.4 K/CU MM
MONOCYTES RELATIVE PERCENT: 4.8 % (ref 0–4)
MUCUS: ABNORMAL HPF
NITRITE URINE, QUANTITATIVE: NEGATIVE
NUCLEATED RBC %: 0 %
PDW BLD-RTO: 12.6 % (ref 11.7–14.9)
PH, URINE: 8 (ref 5–8)
PLATELET # BLD: 266 K/CU MM (ref 140–440)
PMV BLD AUTO: 11.5 FL (ref 7.5–11.1)
POTASSIUM SERPL-SCNC: 3.7 MMOL/L (ref 3.5–5.1)
PROTEIN UA: NEGATIVE MG/DL
RBC # BLD: 4.71 M/CU MM (ref 4.2–5.4)
RBC URINE: <1 /HPF (ref 0–6)
SEGMENTED NEUTROPHILS ABSOLUTE COUNT: 5.2 K/CU MM
SEGMENTED NEUTROPHILS RELATIVE PERCENT: 70.5 % (ref 36–66)
SODIUM BLD-SCNC: 137 MMOL/L (ref 135–145)
SPECIFIC GRAVITY UA: 1.01 (ref 1–1.03)
SQUAMOUS EPITHELIAL: 7 /HPF
TOTAL IMMATURE NEUTOROPHIL: 0.02 K/CU MM
TOTAL NUCLEATED RBC: 0 K/CU MM
TOTAL PROTEIN: 7 GM/DL (ref 6.4–8.2)
TRICHOMONAS: ABNORMAL /HPF
UROBILINOGEN, URINE: 0.2 MG/DL (ref 0.2–1)
WBC # BLD: 7.3 K/CU MM (ref 4–10.5)
WBC UA: 1 /HPF (ref 0–5)

## 2024-01-17 PROCEDURE — 80053 COMPREHEN METABOLIC PANEL: CPT

## 2024-01-17 PROCEDURE — 85025 COMPLETE CBC W/AUTO DIFF WBC: CPT

## 2024-01-17 PROCEDURE — 87086 URINE CULTURE/COLONY COUNT: CPT

## 2024-01-17 PROCEDURE — 85652 RBC SED RATE AUTOMATED: CPT

## 2024-01-17 PROCEDURE — 71046 X-RAY EXAM CHEST 2 VIEWS: CPT

## 2024-01-17 PROCEDURE — 81001 URINALYSIS AUTO W/SCOPE: CPT

## 2024-01-17 PROCEDURE — 36415 COLL VENOUS BLD VENIPUNCTURE: CPT

## 2024-01-17 ASSESSMENT — PAIN SCALES - GENERAL: PAINLEVEL_OUTOF10: 4

## 2024-01-17 NOTE — PROGRESS NOTES
.Surgery @ [facility] on 1/29/24 you will be called 1/26/24 with times    NOTHING TO EAT OR DRINK AFTER MIDNIGHT DAY OF SURGERY    1. Enter thru the hospital main entrance on day of surgery, check in at the Information Desk. If you arrive prior to 6:00am, enter thru the ER entrance.    2. Follow the directions as prescribed by the doctor for your procedure and medications.         Morning of surgery take: Amlodipine, Synthroid, Sotalol with sips of water          Stop vitamins, supplements and NSAIDS:  1/22/24    3. Check with your Doctor regarding stopping blood thinners and follow their instructions.    4. Do not smoke, vape or use chewing tobacco morning of surgery. Do not drink any alcoholic beverages 24 hours prior to surgery.       This includes NA Beer. No street drugs 7 days prior to surgery.    5. If you have dentures, contacts of glasses they will be removed before going to the OR; please bring a case.    6. Please bring picture ID, insurance card, paperwork from the doctor’s office (H & P, Consent, & card for implantable devices).    7. Take a shower with an antibacterial soap the night before surgery and the morning of surgery. Do not put anything on your skin      After your morning shower.    8. You will need a responsible adult to drive you home and check on you after surgery.

## 2024-01-18 LAB
CULTURE: NORMAL
Lab: NORMAL
SPECIMEN: NORMAL

## 2024-01-22 ENCOUNTER — TELEPHONE (OUTPATIENT)
Dept: ORTHOPEDIC SURGERY | Age: 69
End: 2024-01-22

## 2024-01-22 ENCOUNTER — TELEPHONE (OUTPATIENT)
Dept: FAMILY MEDICINE CLINIC | Age: 69
End: 2024-01-22

## 2024-01-22 NOTE — TELEPHONE ENCOUNTER
Patient LVM stating PAT advised she should ask when to stop taking her Xarelto, as they were not sure. DOS 1/29/2024, please advise a stop date for Xarelto.

## 2024-01-22 NOTE — TELEPHONE ENCOUNTER
Christine Garcia has everything she needs for patient's pre-op so she does not need to come to appointment on 1/23. I asked patient to call us back so we know she received the message.

## 2024-01-26 ENCOUNTER — ANESTHESIA EVENT (OUTPATIENT)
Dept: OPERATING ROOM | Age: 69
End: 2024-01-26
Payer: COMMERCIAL

## 2024-01-29 ENCOUNTER — ANESTHESIA (OUTPATIENT)
Dept: OPERATING ROOM | Age: 69
End: 2024-01-29
Payer: COMMERCIAL

## 2024-02-01 DIAGNOSIS — Z96.651 S/P TOTAL KNEE ARTHROPLASTY, RIGHT: Primary | ICD-10-CM

## 2024-02-05 ENCOUNTER — APPOINTMENT (OUTPATIENT)
Dept: GENERAL RADIOLOGY | Age: 69
End: 2024-02-05
Attending: ORTHOPAEDIC SURGERY
Payer: COMMERCIAL

## 2024-02-05 ENCOUNTER — HOSPITAL ENCOUNTER (OUTPATIENT)
Age: 69
Setting detail: OBSERVATION
Discharge: HOME HEALTH | End: 2024-02-06
Attending: ORTHOPAEDIC SURGERY | Admitting: ORTHOPAEDIC SURGERY
Payer: COMMERCIAL

## 2024-02-05 DIAGNOSIS — Z96.651 S/P TOTAL KNEE ARTHROPLASTY, RIGHT: Primary | ICD-10-CM

## 2024-02-05 LAB
CREAT SERPL-MCNC: 1.2 MG/DL (ref 0.6–1.1)
GFR SERPL CREATININE-BSD FRML MDRD: 49 ML/MIN/1.73M2
GLUCOSE BLD-MCNC: 121 MG/DL (ref 70–99)

## 2024-02-05 PROCEDURE — 2580000003 HC RX 258: Performed by: ORTHOPAEDIC SURGERY

## 2024-02-05 PROCEDURE — G0378 HOSPITAL OBSERVATION PER HR: HCPCS

## 2024-02-05 PROCEDURE — 2709999900 HC NON-CHARGEABLE SUPPLY: Performed by: ORTHOPAEDIC SURGERY

## 2024-02-05 PROCEDURE — 6360000002 HC RX W HCPCS: Performed by: ORTHOPAEDIC SURGERY

## 2024-02-05 PROCEDURE — 3700000001 HC ADD 15 MINUTES (ANESTHESIA): Performed by: ORTHOPAEDIC SURGERY

## 2024-02-05 PROCEDURE — 6360000002 HC RX W HCPCS: Performed by: NURSE ANESTHETIST, CERTIFIED REGISTERED

## 2024-02-05 PROCEDURE — A4217 STERILE WATER/SALINE, 500 ML: HCPCS | Performed by: ORTHOPAEDIC SURGERY

## 2024-02-05 PROCEDURE — 76942 ECHO GUIDE FOR BIOPSY: CPT | Performed by: ANESTHESIOLOGY

## 2024-02-05 PROCEDURE — 6370000000 HC RX 637 (ALT 250 FOR IP): Performed by: ORTHOPAEDIC SURGERY

## 2024-02-05 PROCEDURE — 36415 COLL VENOUS BLD VENIPUNCTURE: CPT

## 2024-02-05 PROCEDURE — 3600000009 HC SURGERY ROBOT BASE: Performed by: ORTHOPAEDIC SURGERY

## 2024-02-05 PROCEDURE — 97116 GAIT TRAINING THERAPY: CPT

## 2024-02-05 PROCEDURE — 2720000010 HC SURG SUPPLY STERILE: Performed by: ORTHOPAEDIC SURGERY

## 2024-02-05 PROCEDURE — 94761 N-INVAS EAR/PLS OXIMETRY MLT: CPT

## 2024-02-05 PROCEDURE — 3700000000 HC ANESTHESIA ATTENDED CARE: Performed by: ORTHOPAEDIC SURGERY

## 2024-02-05 PROCEDURE — 82962 GLUCOSE BLOOD TEST: CPT

## 2024-02-05 PROCEDURE — S2900 ROBOTIC SURGICAL SYSTEM: HCPCS | Performed by: ORTHOPAEDIC SURGERY

## 2024-02-05 PROCEDURE — 7100000000 HC PACU RECOVERY - FIRST 15 MIN: Performed by: ORTHOPAEDIC SURGERY

## 2024-02-05 PROCEDURE — 3600000019 HC SURGERY ROBOT ADDTL 15MIN: Performed by: ORTHOPAEDIC SURGERY

## 2024-02-05 PROCEDURE — 6360000002 HC RX W HCPCS: Performed by: ANESTHESIOLOGY

## 2024-02-05 PROCEDURE — C1713 ANCHOR/SCREW BN/BN,TIS/BN: HCPCS | Performed by: ORTHOPAEDIC SURGERY

## 2024-02-05 PROCEDURE — C1776 JOINT DEVICE (IMPLANTABLE): HCPCS | Performed by: ORTHOPAEDIC SURGERY

## 2024-02-05 PROCEDURE — 82565 ASSAY OF CREATININE: CPT

## 2024-02-05 PROCEDURE — 2500000003 HC RX 250 WO HCPCS: Performed by: ORTHOPAEDIC SURGERY

## 2024-02-05 PROCEDURE — 97530 THERAPEUTIC ACTIVITIES: CPT

## 2024-02-05 PROCEDURE — 2500000003 HC RX 250 WO HCPCS: Performed by: NURSE ANESTHETIST, CERTIFIED REGISTERED

## 2024-02-05 PROCEDURE — 97161 PT EVAL LOW COMPLEX 20 MIN: CPT

## 2024-02-05 PROCEDURE — 73560 X-RAY EXAM OF KNEE 1 OR 2: CPT

## 2024-02-05 PROCEDURE — 7100000001 HC PACU RECOVERY - ADDTL 15 MIN: Performed by: ORTHOPAEDIC SURGERY

## 2024-02-05 DEVICE — CEMENT BNE 40GM HI VISC RADPQ FOR REV SURG: Type: IMPLANTABLE DEVICE | Site: KNEE | Status: FUNCTIONAL

## 2024-02-05 RX ORDER — LABETALOL HYDROCHLORIDE 5 MG/ML
10 INJECTION, SOLUTION INTRAVENOUS
Status: DISCONTINUED | OUTPATIENT
Start: 2024-02-05 | End: 2024-02-05 | Stop reason: HOSPADM

## 2024-02-05 RX ORDER — ROPIVACAINE HYDROCHLORIDE 5 MG/ML
INJECTION, SOLUTION EPIDURAL; INFILTRATION; PERINEURAL
Status: COMPLETED
Start: 2024-02-05 | End: 2024-02-05

## 2024-02-05 RX ORDER — HYDROCHLOROTHIAZIDE 25 MG/1
50 TABLET ORAL DAILY
Status: DISCONTINUED | OUTPATIENT
Start: 2024-02-06 | End: 2024-02-06 | Stop reason: HOSPADM

## 2024-02-05 RX ORDER — LIDOCAINE HYDROCHLORIDE 10 MG/ML
INJECTION, SOLUTION EPIDURAL; INFILTRATION; INTRACAUDAL; PERINEURAL
Status: DISPENSED
Start: 2024-02-05 | End: 2024-02-05

## 2024-02-05 RX ORDER — KETAMINE HCL 50MG/ML(1)
SYRINGE (ML) INTRAVENOUS PRN
Status: DISCONTINUED | OUTPATIENT
Start: 2024-02-05 | End: 2024-02-05 | Stop reason: SDUPTHER

## 2024-02-05 RX ORDER — MAGNESIUM HYDROXIDE 1200 MG/15ML
LIQUID ORAL CONTINUOUS PRN
Status: COMPLETED | OUTPATIENT
Start: 2024-02-05 | End: 2024-02-05

## 2024-02-05 RX ORDER — SODIUM CHLORIDE 0.9 % (FLUSH) 0.9 %
5-40 SYRINGE (ML) INJECTION PRN
Status: CANCELLED | OUTPATIENT
Start: 2024-02-05

## 2024-02-05 RX ORDER — DEXAMETHASONE SODIUM PHOSPHATE 4 MG/ML
INJECTION, SOLUTION INTRA-ARTICULAR; INTRALESIONAL; INTRAMUSCULAR; INTRAVENOUS; SOFT TISSUE PRN
Status: DISCONTINUED | OUTPATIENT
Start: 2024-02-05 | End: 2024-02-05 | Stop reason: SDUPTHER

## 2024-02-05 RX ORDER — SODIUM CHLORIDE 9 MG/ML
INJECTION, SOLUTION INTRAVENOUS PRN
Status: DISCONTINUED | OUTPATIENT
Start: 2024-02-05 | End: 2024-02-05 | Stop reason: HOSPADM

## 2024-02-05 RX ORDER — INDAPAMIDE 2.5 MG/1
2.5 TABLET ORAL DAILY
Status: DISCONTINUED | OUTPATIENT
Start: 2024-02-06 | End: 2024-02-05 | Stop reason: CLARIF

## 2024-02-05 RX ORDER — ONDANSETRON 2 MG/ML
4 INJECTION INTRAMUSCULAR; INTRAVENOUS EVERY 6 HOURS PRN
Status: DISCONTINUED | OUTPATIENT
Start: 2024-02-05 | End: 2024-02-06 | Stop reason: HOSPADM

## 2024-02-05 RX ORDER — LIDOCAINE HYDROCHLORIDE 20 MG/ML
INJECTION, SOLUTION INTRAVENOUS PRN
Status: DISCONTINUED | OUTPATIENT
Start: 2024-02-05 | End: 2024-02-05 | Stop reason: SDUPTHER

## 2024-02-05 RX ORDER — ONDANSETRON 4 MG/1
4 TABLET, ORALLY DISINTEGRATING ORAL EVERY 8 HOURS PRN
Status: DISCONTINUED | OUTPATIENT
Start: 2024-02-05 | End: 2024-02-06 | Stop reason: HOSPADM

## 2024-02-05 RX ORDER — POTASSIUM CHLORIDE 20 MEQ/1
20 TABLET, EXTENDED RELEASE ORAL DAILY
Status: DISCONTINUED | OUTPATIENT
Start: 2024-02-05 | End: 2024-02-06 | Stop reason: HOSPADM

## 2024-02-05 RX ORDER — SODIUM CHLORIDE 0.9 % (FLUSH) 0.9 %
5-40 SYRINGE (ML) INJECTION EVERY 12 HOURS SCHEDULED
Status: DISCONTINUED | OUTPATIENT
Start: 2024-02-05 | End: 2024-02-05 | Stop reason: HOSPADM

## 2024-02-05 RX ORDER — ACETAMINOPHEN 325 MG/1
650 TABLET ORAL EVERY 4 HOURS PRN
Status: DISCONTINUED | OUTPATIENT
Start: 2024-02-05 | End: 2024-02-06 | Stop reason: HOSPADM

## 2024-02-05 RX ORDER — ONDANSETRON 2 MG/ML
4 INJECTION INTRAMUSCULAR; INTRAVENOUS
Status: DISCONTINUED | OUTPATIENT
Start: 2024-02-05 | End: 2024-02-05 | Stop reason: HOSPADM

## 2024-02-05 RX ORDER — OXYCODONE HYDROCHLORIDE 5 MG/1
5 TABLET ORAL
Status: DISCONTINUED | OUTPATIENT
Start: 2024-02-05 | End: 2024-02-05 | Stop reason: HOSPADM

## 2024-02-05 RX ORDER — EPHEDRINE SULFATE 50 MG/ML
INJECTION INTRAVENOUS PRN
Status: DISCONTINUED | OUTPATIENT
Start: 2024-02-05 | End: 2024-02-05 | Stop reason: SDUPTHER

## 2024-02-05 RX ORDER — SOTALOL HYDROCHLORIDE 80 MG/1
80 TABLET ORAL DAILY
Status: DISCONTINUED | OUTPATIENT
Start: 2024-02-05 | End: 2024-02-06 | Stop reason: HOSPADM

## 2024-02-05 RX ORDER — SODIUM CHLORIDE, SODIUM LACTATE, POTASSIUM CHLORIDE, CALCIUM CHLORIDE 600; 310; 30; 20 MG/100ML; MG/100ML; MG/100ML; MG/100ML
INJECTION, SOLUTION INTRAVENOUS CONTINUOUS
Status: DISCONTINUED | OUTPATIENT
Start: 2024-02-05 | End: 2024-02-05 | Stop reason: HOSPADM

## 2024-02-05 RX ORDER — SODIUM CHLORIDE 0.9 % (FLUSH) 0.9 %
5-40 SYRINGE (ML) INJECTION PRN
Status: DISCONTINUED | OUTPATIENT
Start: 2024-02-05 | End: 2024-02-05 | Stop reason: HOSPADM

## 2024-02-05 RX ORDER — PHENYLEPHRINE HCL IN 0.9% NACL 1 MG/10 ML
SYRINGE (ML) INTRAVENOUS PRN
Status: DISCONTINUED | OUTPATIENT
Start: 2024-02-05 | End: 2024-02-05 | Stop reason: SDUPTHER

## 2024-02-05 RX ORDER — TRANEXAMIC ACID 10 MG/ML
1000 INJECTION, SOLUTION INTRAVENOUS
Status: COMPLETED | OUTPATIENT
Start: 2024-02-05 | End: 2024-02-05

## 2024-02-05 RX ORDER — LEVOTHYROXINE SODIUM 112 UG/1
112 TABLET ORAL DAILY
Status: DISCONTINUED | OUTPATIENT
Start: 2024-02-06 | End: 2024-02-06 | Stop reason: HOSPADM

## 2024-02-05 RX ORDER — ONDANSETRON 2 MG/ML
INJECTION INTRAMUSCULAR; INTRAVENOUS PRN
Status: DISCONTINUED | OUTPATIENT
Start: 2024-02-05 | End: 2024-02-05 | Stop reason: SDUPTHER

## 2024-02-05 RX ORDER — SODIUM CHLORIDE, SODIUM LACTATE, POTASSIUM CHLORIDE, CALCIUM CHLORIDE 600; 310; 30; 20 MG/100ML; MG/100ML; MG/100ML; MG/100ML
INJECTION, SOLUTION INTRAVENOUS CONTINUOUS
Status: DISPENSED | OUTPATIENT
Start: 2024-02-05 | End: 2024-02-06

## 2024-02-05 RX ORDER — GLYCOPYRROLATE 0.2 MG/ML
INJECTION INTRAMUSCULAR; INTRAVENOUS PRN
Status: DISCONTINUED | OUTPATIENT
Start: 2024-02-05 | End: 2024-02-05 | Stop reason: SDUPTHER

## 2024-02-05 RX ORDER — TRANEXAMIC ACID 10 MG/ML
1000 INJECTION, SOLUTION INTRAVENOUS
Status: CANCELLED | OUTPATIENT
Start: 2024-02-05 | End: 2024-02-05

## 2024-02-05 RX ORDER — MEPERIDINE HYDROCHLORIDE 25 MG/ML
12.5 INJECTION INTRAMUSCULAR; INTRAVENOUS; SUBCUTANEOUS EVERY 5 MIN PRN
Status: DISCONTINUED | OUTPATIENT
Start: 2024-02-05 | End: 2024-02-05 | Stop reason: HOSPADM

## 2024-02-05 RX ORDER — ACETAMINOPHEN 500 MG
1000 TABLET ORAL ONCE
Status: COMPLETED | OUTPATIENT
Start: 2024-02-05 | End: 2024-02-05

## 2024-02-05 RX ORDER — HYDRALAZINE HYDROCHLORIDE 20 MG/ML
10 INJECTION INTRAMUSCULAR; INTRAVENOUS
Status: DISCONTINUED | OUTPATIENT
Start: 2024-02-05 | End: 2024-02-05 | Stop reason: HOSPADM

## 2024-02-05 RX ORDER — ACETAMINOPHEN 500 MG
1000 TABLET ORAL ONCE
Status: CANCELLED | OUTPATIENT
Start: 2024-02-05 | End: 2024-02-05

## 2024-02-05 RX ORDER — SODIUM CHLORIDE 9 MG/ML
INJECTION, SOLUTION INTRAVENOUS PRN
Status: CANCELLED | OUTPATIENT
Start: 2024-02-05

## 2024-02-05 RX ORDER — AMLODIPINE BESYLATE 5 MG/1
5 TABLET ORAL DAILY
Status: DISCONTINUED | OUTPATIENT
Start: 2024-02-06 | End: 2024-02-06 | Stop reason: HOSPADM

## 2024-02-05 RX ORDER — ROPIVACAINE HYDROCHLORIDE 5 MG/ML
INJECTION, SOLUTION EPIDURAL; INFILTRATION; PERINEURAL
Status: COMPLETED | OUTPATIENT
Start: 2024-02-05 | End: 2024-02-05

## 2024-02-05 RX ORDER — SODIUM CHLORIDE 0.9 % (FLUSH) 0.9 %
5-40 SYRINGE (ML) INJECTION EVERY 12 HOURS SCHEDULED
Status: DISCONTINUED | OUTPATIENT
Start: 2024-02-05 | End: 2024-02-06 | Stop reason: HOSPADM

## 2024-02-05 RX ORDER — OXYCODONE HYDROCHLORIDE 5 MG/1
5 TABLET ORAL EVERY 4 HOURS PRN
Status: DISCONTINUED | OUTPATIENT
Start: 2024-02-05 | End: 2024-02-06 | Stop reason: HOSPADM

## 2024-02-05 RX ORDER — SODIUM CHLORIDE 0.9 % (FLUSH) 0.9 %
5-40 SYRINGE (ML) INJECTION EVERY 12 HOURS SCHEDULED
Status: CANCELLED | OUTPATIENT
Start: 2024-02-05

## 2024-02-05 RX ORDER — FENTANYL CITRATE 50 UG/ML
50 INJECTION, SOLUTION INTRAMUSCULAR; INTRAVENOUS EVERY 5 MIN PRN
Status: DISCONTINUED | OUTPATIENT
Start: 2024-02-05 | End: 2024-02-05 | Stop reason: HOSPADM

## 2024-02-05 RX ORDER — BUPIVACAINE HYDROCHLORIDE 5 MG/ML
INJECTION, SOLUTION EPIDURAL; INTRACAUDAL
Status: COMPLETED | OUTPATIENT
Start: 2024-02-05 | End: 2024-02-05

## 2024-02-05 RX ORDER — OXYCODONE HYDROCHLORIDE AND ACETAMINOPHEN 5; 325 MG/1; MG/1
1 TABLET ORAL EVERY 6 HOURS
Status: DISCONTINUED | OUTPATIENT
Start: 2024-02-05 | End: 2024-02-06 | Stop reason: HOSPADM

## 2024-02-05 RX ORDER — MIDAZOLAM HYDROCHLORIDE 1 MG/ML
INJECTION INTRAMUSCULAR; INTRAVENOUS PRN
Status: DISCONTINUED | OUTPATIENT
Start: 2024-02-05 | End: 2024-02-05 | Stop reason: SDUPTHER

## 2024-02-05 RX ORDER — DROPERIDOL 2.5 MG/ML
0.62 INJECTION, SOLUTION INTRAMUSCULAR; INTRAVENOUS
Status: DISCONTINUED | OUTPATIENT
Start: 2024-02-05 | End: 2024-02-05 | Stop reason: HOSPADM

## 2024-02-05 RX ORDER — SODIUM CHLORIDE 9 MG/ML
INJECTION, SOLUTION INTRAVENOUS PRN
Status: DISCONTINUED | OUTPATIENT
Start: 2024-02-05 | End: 2024-02-06 | Stop reason: HOSPADM

## 2024-02-05 RX ORDER — PROPOFOL 10 MG/ML
INJECTION, EMULSION INTRAVENOUS CONTINUOUS PRN
Status: DISCONTINUED | OUTPATIENT
Start: 2024-02-05 | End: 2024-02-05 | Stop reason: SDUPTHER

## 2024-02-05 RX ORDER — SODIUM CHLORIDE 0.9 % (FLUSH) 0.9 %
5-40 SYRINGE (ML) INJECTION PRN
Status: DISCONTINUED | OUTPATIENT
Start: 2024-02-05 | End: 2024-02-06 | Stop reason: HOSPADM

## 2024-02-05 RX ADMIN — SODIUM CHLORIDE, POTASSIUM CHLORIDE, SODIUM LACTATE AND CALCIUM CHLORIDE: 600; 310; 30; 20 INJECTION, SOLUTION INTRAVENOUS at 07:26

## 2024-02-05 RX ADMIN — Medication 100 MCG: at 09:25

## 2024-02-05 RX ADMIN — EPHEDRINE SULFATE 25 MG: 50 INJECTION INTRAVENOUS at 10:17

## 2024-02-05 RX ADMIN — BUPIVACAINE HYDROCHLORIDE 12.5 MG: 5 INJECTION EPIDURAL; INTRACAUDAL; PERINEURAL at 08:54

## 2024-02-05 RX ADMIN — EPHEDRINE SULFATE 5 MG: 50 INJECTION INTRAVENOUS at 10:15

## 2024-02-05 RX ADMIN — Medication 100 MCG: at 10:00

## 2024-02-05 RX ADMIN — PROPOFOL 150 MCG/KG/MIN: 10 INJECTION, EMULSION INTRAVENOUS at 09:01

## 2024-02-05 RX ADMIN — Medication 100 MCG: at 09:49

## 2024-02-05 RX ADMIN — ONDANSETRON 4 MG: 4 TABLET, ORALLY DISINTEGRATING ORAL at 15:57

## 2024-02-05 RX ADMIN — ONDANSETRON 4 MG: 2 INJECTION INTRAMUSCULAR; INTRAVENOUS at 09:06

## 2024-02-05 RX ADMIN — CEFAZOLIN 2000 MG: 2 INJECTION, POWDER, FOR SOLUTION INTRAMUSCULAR; INTRAVENOUS at 15:59

## 2024-02-05 RX ADMIN — LIDOCAINE HYDROCHLORIDE 50 MG: 20 INJECTION, SOLUTION INTRAVENOUS at 09:01

## 2024-02-05 RX ADMIN — Medication 25 MG: at 09:01

## 2024-02-05 RX ADMIN — MIDAZOLAM 2 MG: 1 INJECTION INTRAMUSCULAR; INTRAVENOUS at 07:45

## 2024-02-05 RX ADMIN — CEFAZOLIN 2000 MG: 2 INJECTION, POWDER, FOR SOLUTION INTRAMUSCULAR; INTRAVENOUS at 09:01

## 2024-02-05 RX ADMIN — ROPIVACAINE HYDROCHLORIDE 20 ML: 5 INJECTION, SOLUTION EPIDURAL; INFILTRATION; PERINEURAL at 07:45

## 2024-02-05 RX ADMIN — Medication 25 MG: at 09:13

## 2024-02-05 RX ADMIN — SODIUM CHLORIDE, POTASSIUM CHLORIDE, SODIUM LACTATE AND CALCIUM CHLORIDE: 600; 310; 30; 20 INJECTION, SOLUTION INTRAVENOUS at 12:08

## 2024-02-05 RX ADMIN — Medication 200 MCG: at 10:14

## 2024-02-05 RX ADMIN — OXYCODONE HYDROCHLORIDE AND ACETAMINOPHEN 1 TABLET: 5; 325 TABLET ORAL at 13:23

## 2024-02-05 RX ADMIN — Medication 100 MCG: at 09:34

## 2024-02-05 RX ADMIN — Medication 200 MCG: at 10:08

## 2024-02-05 RX ADMIN — EPHEDRINE SULFATE 10 MG: 50 INJECTION INTRAVENOUS at 10:16

## 2024-02-05 RX ADMIN — Medication 100 MCG: at 09:42

## 2024-02-05 RX ADMIN — Medication 100 MCG: at 09:12

## 2024-02-05 RX ADMIN — EPHEDRINE SULFATE 10 MG: 50 INJECTION INTRAVENOUS at 10:14

## 2024-02-05 RX ADMIN — TRANEXAMIC ACID 1000 MG: 10 INJECTION, SOLUTION INTRAVENOUS at 09:11

## 2024-02-05 RX ADMIN — GLYCOPYRROLATE 0.2 MG: 0.2 INJECTION, SOLUTION INTRAMUSCULAR; INTRAVENOUS at 09:14

## 2024-02-05 RX ADMIN — OXYCODONE HYDROCHLORIDE AND ACETAMINOPHEN 1 TABLET: 5; 325 TABLET ORAL at 19:11

## 2024-02-05 RX ADMIN — SODIUM CHLORIDE, POTASSIUM CHLORIDE, SODIUM LACTATE AND CALCIUM CHLORIDE: 600; 310; 30; 20 INJECTION, SOLUTION INTRAVENOUS at 10:04

## 2024-02-05 RX ADMIN — POTASSIUM CHLORIDE 20 MEQ: 1500 TABLET, EXTENDED RELEASE ORAL at 15:57

## 2024-02-05 RX ADMIN — DEXAMETHASONE SODIUM PHOSPHATE 8 MG: 4 INJECTION, SOLUTION INTRAMUSCULAR; INTRAVENOUS at 09:06

## 2024-02-05 RX ADMIN — ACETAMINOPHEN 1000 MG: 500 TABLET ORAL at 07:22

## 2024-02-05 ASSESSMENT — PAIN - FUNCTIONAL ASSESSMENT
PAIN_FUNCTIONAL_ASSESSMENT: ACTIVITIES ARE NOT PREVENTED
PAIN_FUNCTIONAL_ASSESSMENT: 0-10
PAIN_FUNCTIONAL_ASSESSMENT: ACTIVITIES ARE NOT PREVENTED

## 2024-02-05 ASSESSMENT — PAIN SCALES - GENERAL
PAINLEVEL_OUTOF10: 1
PAINLEVEL_OUTOF10: 3
PAINLEVEL_OUTOF10: 3

## 2024-02-05 ASSESSMENT — PAIN DESCRIPTION - PAIN TYPE: TYPE: ACUTE PAIN

## 2024-02-05 ASSESSMENT — LIFESTYLE VARIABLES: SMOKING_STATUS: 1

## 2024-02-05 ASSESSMENT — PAIN DESCRIPTION - ORIENTATION
ORIENTATION: RIGHT
ORIENTATION: RIGHT

## 2024-02-05 ASSESSMENT — PAIN DESCRIPTION - LOCATION
LOCATION: KNEE
LOCATION: KNEE

## 2024-02-05 ASSESSMENT — PAIN DESCRIPTION - DESCRIPTORS
DESCRIPTORS: ACHING;PRESSURE
DESCRIPTORS: PRESSURE

## 2024-02-05 ASSESSMENT — PAIN DESCRIPTION - FREQUENCY: FREQUENCY: CONTINUOUS

## 2024-02-05 ASSESSMENT — PAIN DESCRIPTION - ONSET: ONSET: ON-GOING

## 2024-02-05 NOTE — ANESTHESIA PROCEDURE NOTES
Peripheral Block    Patient location during procedure: procedure area  Reason for block: procedure for pain, post-op pain management and at surgeon's request  Start time: 2/5/2024 7:45 AM  End time: 2/5/2024 7:50 AM  Staffing  Performed: resident/CRNA   Anesthesiologist: Taurus Rees MD  Resident/CRNA: Nicky Kearney APRN - CRNA  Performed by: Nicky Kearney APRN - CRNA  Authorized by: Taurus Rees MD    Preanesthetic Checklist  Completed: patient identified, IV checked, site marked, risks and benefits discussed, surgical/procedural consents, equipment checked, pre-op evaluation, timeout performed, anesthesia consent given, oxygen available, monitors applied/VS acknowledged, fire risk safety assessment completed and verbalized and blood product R/B/A discussed and consented  Peripheral Block   Patient position: supine  Prep: ChloraPrep  Provider prep: sterile gloves and mask  Patient monitoring: cardiac monitor, continuous pulse ox, continuous capnometry, frequent blood pressure checks, IV access, responsive to questions and oxygen  Block type: Fascia iliaca  Laterality: right  Injection technique: single-shot  Guidance: paresthesia technique and ultrasound guided  Local infiltration: lidocaine  Infiltration strength: 1 %  Local infiltration: lidocaine  Dose: 3 mL    Needle   Needle type: insulated echogenic nerve stimulator needle   Needle gauge: 20 G  Needle localization: ultrasound guidance  Needle length: 10 cm  Assessment   Injection assessment: negative aspiration for heme, no paresthesia on injection, local visualized surrounding nerve on ultrasound and no intravascular symptoms  Paresthesia pain: none  Slow fractionated injection: yes  Hemodynamics: stable  Real-time US image taken/store: yes  Outcomes: uncomplicated and patient tolerated procedure well    Medications Administered  ropivacaine (NAROPIN) injection 0.5% - Perineural   20 mL - 2/5/2024 7:45:00 AM

## 2024-02-05 NOTE — PROGRESS NOTES
1127: pt in PACU. Monitors applied and alarms on. Report from Nathan PINTO and Nicky BOLANOS. SCD attached to pt.  1130: xray called for post, pt denies pain at this time.  1145: xray at bedside  1220: called to 1N for report, nurse will call back once available  1225: called , Jayme, to update and left vm for room # and status    1226:   1

## 2024-02-05 NOTE — H&P
Chief complaint: Worsening right knee pain     History of Present Illness:                             Lashawn Duque is a 68 y.o. female who presents today for evaluation of her progressive worsening right knee pain.  She has responded well to injections in the past but has noticed that the last injection was less effective than previous ones.  She has trouble going up and down stairs and try to keep up with her exercise program.  Her knee is constant aching all day long through the medial joint line and also bothers her at night.  She has trouble getting up from a seated position and going up and down stairs.  She is becoming more sedentary because of the significance of the knee pain and dysfunction.  Pain is most significant along the medial joint line and worse with direct pressure and weightbearing.        Patient seen in office today for:  Right knee pain     Date of last injection: 9/19/2023     Patient reports 8/10 pain.  RICE and medication are somewhat effective to alleviate pain and reduce swelling.      Pain worsened by: Patient reports painful ROM and weight bearing     Patient interested in speaking to provider about surgical option.         Medical History  Patient's medications, allergies, past medical, surgical, social and family histories were reviewed and updated as appropriate.     Past Medical History        Past Medical History:   Diagnosis Date    A-fib ([facility]) 07/2023    Acute kidney failure, unspecified ([facility]) 08/30/2021    Diabetes mellitus ([facility])      H/O 24 hour EKG monitoring 11/06/2019     Basic sinus rhythm Minimum HR 41 bpm APC noted brief SVT noted Pt reported symptoms of lightheadedness no arrhythmias noted.     H/O echocardiogram 11/29/2019     EF 55-60%, Grade II DD, Mild MR, TR & PHTN.    History of nuclear stress test 11/25/2019     normal study.    Hypertension      Hypopotassemia 07/12/2022    NELSON on CPAP      Thyroid disease           Past Surgical History         Past Surgical

## 2024-02-05 NOTE — ANESTHESIA PROCEDURE NOTES
Spinal Block    Patient location during procedure: OR  End time: 2/5/2024 9:01 AM  Reason for block: primary anesthetic and at surgeon's request  Staffing  Performed: resident/CRNA   Anesthesiologist: Taurus Rees MD  Resident/CRNA: Nicky Kearney APRN - CRNA  Performed by: Nicky Kearney APRN - CICI  Authorized by: Taurus Rees MD    Spinal Block  Patient position: sitting  Prep: Betadine  Patient monitoring: cardiac monitor, continuous capnometry, continuous pulse ox, frequent blood pressure checks and oxygen  Approach: midline  Location: L4/L5  Guidance: paresthesia technique  Provider prep: sterile gloves, sterile gown and mask  Local infiltration: lidocaine  Needle  Needle type: Quincke   Needle gauge: 21 G  Needle length: 3.5 in  Assessment  Sensory level: T8  Swirl obtained: Yes  CSF: clear  Attempts: 1  Hemodynamics: stable  Preanesthetic Checklist  Completed: patient identified, IV checked, site marked, risks and benefits discussed, surgical/procedural consents, equipment checked, pre-op evaluation, timeout performed, anesthesia consent given, oxygen available, monitors applied/VS acknowledged, fire risk safety assessment completed and verbalized and blood product R/B/A discussed and consented

## 2024-02-05 NOTE — OP NOTE
Operative Note      Patient: Lashawn Duque  YOB: 1955  MRN: [id number]    Date of Procedure: 2/5/2024    Pre-Op Diagnosis Codes:     * Primary osteoarthritis of right knee [M17.11]     * Chronic pain of right knee [M25.561, G89.29]    Post-Op Diagnosis: Same       Procedure(s):  RIGHT KNEE TOTAL ARTHROPLASTY ROBOTIC    Surgeon(s):  Yan Martínez MD    Assistant:   Physician Assistant: Denver Bhatti PA-C Greg Mann was present for the entirety of the procedure and vital for the performance of the procedure. Eugene Bhatti assisted with positioning, prepping, draping of the patient before the procedure and instrument manipulation, extremity repositioning during the procedure as well as wound closure, dressing application and brace application after the procedure. Please note that no intern, resident, or other hospital staff was available to assist during the surgery      Anesthesia: Spinal    Estimated Blood Loss (mL): less than 100     Complications: None    Specimens:   * No specimens in log *    Implants:  Implant Name Type Inv. Item Serial No.  Lot No. LRB No. Used Action   CEMENT BNE 40GM HI VISC RADPQ FOR REV SURG - [id number]  CEMENT BNE 40GM HI VISC RADPQ FOR REV SURG  [employer]- [id number] Right 1 Implanted   CEMENT BNE 40GM HI VISC RADPQ FOR REV SURG - [id number]  CEMENT BNE 40GM HI VISC RADPQ FOR REV SURG  [employer]- [id number] Right 1 Implanted   IMPL PATELLA COMP TRIATH ASYMMETRC X3 - [id number] Knee IMPL PATELLA COMP TRIATH ASYMMETRC X3  JOSE: ORTHOPAEDICS-[facility] [id number] Right 1 Implanted   BASEPLATE TIB SZ 4 UNIV KNEE TRITANIUM TOT STBL CARLITA - [id number]  BASEPLATE TIB SZ 4 UNIV KNEE TRITANIUM TOT STBL CARLITA  JOSE [employer] [facility]- [id number] Right 1 Implanted   COMPONENT FEM SZ 5 R ANT KNEE CRUCE RET CARLITA REFERENCING - [id number]  COMPONENT FEM SZ 5 R ANT KNEE CRUCE RET CARLITA REFERENCING  JOSE [employer] [facility] [id number] Right 1 Implanted   INSERT

## 2024-02-05 NOTE — ANESTHESIA PRE PROCEDURE
Department of Anesthesiology  Preprocedure Note       Name:  Lashawn Duque   Age:  68 y.o.  :  1955                                          MRN:  [id number]         Date:  2024      Surgeon: Surgeon(s):  Yan Martínez MD    Procedure: Procedure(s):  RIGHT KNEE TOTAL ARTHROPLASTY ROBOTIC    Medications prior to admission:   Prior to Admission medications    Medication Sig Start Date End Date Taking? Authorizing Provider   sotalol (BETAPACE) 80 MG tablet Take 1 tablet by mouth daily 1/11/24 4/10/24  Comfort Parmar APRN - CNP   rivaroxaban (XARELTO) 20 MG TABS tablet Take 1 tablet by mouth daily (with breakfast) 24  Comfort Parmar APRN - CNP   potassium chloride (KLOR-CON M) 20 MEQ extended release tablet Take 1 tablet by mouth daily for 7 days 24  Miri Teague DO   levothyroxine (SYNTHROID) 112 MCG tablet Take 1 tablet by mouth once daily 23   Christine Powers APRN - NP   amLODIPine (NORVASC) 5 MG tablet Take 1 tablet by mouth daily 23  Minnie Gabriel MD   indapamide (LOZOL) 2.5 MG tablet TAKE 1 TAB BY MOUTH IN THE MORNING 23   Minnie Gabriel MD       Current medications:    No current facility-administered medications for this encounter.     Current Outpatient Medications   Medication Sig Dispense Refill    sotalol (BETAPACE) 80 MG tablet Take 1 tablet by mouth daily 90 tablet 0    rivaroxaban (XARELTO) 20 MG TABS tablet Take 1 tablet by mouth daily (with breakfast) 90 tablet 1    potassium chloride (KLOR-CON M) 20 MEQ extended release tablet Take 1 tablet by mouth daily for 7 days 7 tablet 0    levothyroxine (SYNTHROID) 112 MCG tablet Take 1 tablet by mouth once daily 90 tablet 3    amLODIPine (NORVASC) 5 MG tablet Take 1 tablet by mouth daily 90 tablet 3    indapamide (LOZOL) 2.5 MG tablet TAKE 1 TAB BY MOUTH IN THE MORNING 90 tablet 3       Allergies:    Allergies   Allergen Reactions    Seasonal        Problem List:  
2001 urbana difficult airway, blood everywhere, woke up on ventilator, )  Induction: intravenous.    MIPS: Postoperative opioids intended and Prophylactic antiemetics administered.  Anesthetic plan and risks discussed with patient.    Use of blood products discussed with patient whom consented to blood products.    Plan discussed with CRNA.    Attending anesthesiologist reviewed and agrees with Preprocedure content      Post-op pain plan if not by surgeon: single peripheral nerve block          DEBORAH Goncalves - CRNA   2/5/2024

## 2024-02-05 NOTE — PROGRESS NOTES
4 Eyes Skin Assessment     NAME:  Lashawn Duque  YOB: 1955  MEDICAL RECORD NUMBER:  [id number]    The patient is being assessed for  Post-Op Surgical    I agree that at least one RN has performed a thorough Head to Toe Skin Assessment on the patient. ALL assessment sites listed below have been assessed.      Areas assessed by both nurses:    Head, Face, Ears, Shoulders, Back, Chest, Arms, Elbows, Hands, Sacrum. Buttock, Coccyx, Ischium, and Legs. Feet and Heels        Does the Patient have a Wound? No noted wound(s)       Sandro Prevention initiated by RN: No  Wound Care Orders initiated by RN: No    Pressure Injury (Stage 3,4, Unstageable, DTI, NWPT, and Complex wounds) if present, place Wound referral order by RN under : No    New Ostomies, if present place, Ostomy referral order under : No     Surgical incision R knee. Dressing clean, dry and intact    Nurse 1 eSignature: Electronically signed by Magdalena Grant LPN on 2/5/24 at 1:47 PM EST    **SHARE this note so that the co-signing nurse can place an eSignature**    Nurse 2 eSignature: Electronically signed by Marylin Rock RN on 2/5/24 at 1:49 PM EST

## 2024-02-06 VITALS
SYSTOLIC BLOOD PRESSURE: 130 MMHG | RESPIRATION RATE: 16 BRPM | HEART RATE: 63 BPM | OXYGEN SATURATION: 96 % | DIASTOLIC BLOOD PRESSURE: 71 MMHG | BODY MASS INDEX: 38.11 KG/M2 | TEMPERATURE: 97.9 F | HEIGHT: 64 IN

## 2024-02-06 PROBLEM — Z96.651 S/P TOTAL KNEE ARTHROPLASTY, RIGHT: Status: ACTIVE | Noted: 2024-02-06

## 2024-02-06 LAB
ANION GAP SERPL CALCULATED.3IONS-SCNC: 11 MMOL/L (ref 7–16)
BASOPHILS ABSOLUTE: 0 K/CU MM
BASOPHILS RELATIVE PERCENT: 0.1 % (ref 0–1)
BUN SERPL-MCNC: 17 MG/DL (ref 6–23)
CALCIUM SERPL-MCNC: 8.6 MG/DL (ref 8.3–10.6)
CHLORIDE BLD-SCNC: 96 MMOL/L (ref 99–110)
CO2: 25 MMOL/L (ref 21–32)
CREAT SERPL-MCNC: 1 MG/DL (ref 0.6–1.1)
DIFFERENTIAL TYPE: ABNORMAL
EOSINOPHILS ABSOLUTE: 0 K/CU MM
EOSINOPHILS RELATIVE PERCENT: 0 % (ref 0–3)
GFR SERPL CREATININE-BSD FRML MDRD: >60 ML/MIN/1.73M2
GLUCOSE SERPL-MCNC: 126 MG/DL (ref 70–99)
HCT VFR BLD CALC: 33.4 % (ref 37–47)
HEMOGLOBIN: 11.6 GM/DL (ref 12.5–16)
IMMATURE NEUTROPHIL %: 0.4 % (ref 0–0.43)
LYMPHOCYTES ABSOLUTE: 1.2 K/CU MM
LYMPHOCYTES RELATIVE PERCENT: 7.6 % (ref 24–44)
MCH RBC QN AUTO: 30.7 PG (ref 27–31)
MCHC RBC AUTO-ENTMCNC: 34.7 % (ref 32–36)
MCV RBC AUTO: 88.4 FL (ref 78–100)
MONOCYTES ABSOLUTE: 0.7 K/CU MM
MONOCYTES RELATIVE PERCENT: 4.3 % (ref 0–4)
NUCLEATED RBC %: 0 %
PDW BLD-RTO: 12.7 % (ref 11.7–14.9)
PLATELET # BLD: 225 K/CU MM (ref 140–440)
PMV BLD AUTO: 12.2 FL (ref 7.5–11.1)
POTASSIUM SERPL-SCNC: 3.6 MMOL/L (ref 3.5–5.1)
RBC # BLD: 3.78 M/CU MM (ref 4.2–5.4)
SEGMENTED NEUTROPHILS ABSOLUTE COUNT: 13.9 K/CU MM
SEGMENTED NEUTROPHILS RELATIVE PERCENT: 87.6 % (ref 36–66)
SODIUM BLD-SCNC: 132 MMOL/L (ref 135–145)
TOTAL IMMATURE NEUTOROPHIL: 0.06 K/CU MM
TOTAL NUCLEATED RBC: 0 K/CU MM
WBC # BLD: 15.8 K/CU MM (ref 4–10.5)

## 2024-02-06 PROCEDURE — 6360000002 HC RX W HCPCS: Performed by: ORTHOPAEDIC SURGERY

## 2024-02-06 PROCEDURE — 94761 N-INVAS EAR/PLS OXIMETRY MLT: CPT

## 2024-02-06 PROCEDURE — 6370000000 HC RX 637 (ALT 250 FOR IP): Performed by: NURSE PRACTITIONER

## 2024-02-06 PROCEDURE — 97110 THERAPEUTIC EXERCISES: CPT

## 2024-02-06 PROCEDURE — G0378 HOSPITAL OBSERVATION PER HR: HCPCS

## 2024-02-06 PROCEDURE — 80048 BASIC METABOLIC PNL TOTAL CA: CPT

## 2024-02-06 PROCEDURE — 36415 COLL VENOUS BLD VENIPUNCTURE: CPT

## 2024-02-06 PROCEDURE — 2580000003 HC RX 258: Performed by: ORTHOPAEDIC SURGERY

## 2024-02-06 PROCEDURE — 97116 GAIT TRAINING THERAPY: CPT

## 2024-02-06 PROCEDURE — 85025 COMPLETE CBC W/AUTO DIFF WBC: CPT

## 2024-02-06 PROCEDURE — 6370000000 HC RX 637 (ALT 250 FOR IP): Performed by: ORTHOPAEDIC SURGERY

## 2024-02-06 PROCEDURE — 97530 THERAPEUTIC ACTIVITIES: CPT

## 2024-02-06 RX ORDER — DOCUSATE SODIUM 100 MG/1
100 CAPSULE, LIQUID FILLED ORAL DAILY PRN
Qty: 30 CAPSULE | Refills: 0 | Status: SHIPPED | OUTPATIENT
Start: 2024-02-06

## 2024-02-06 RX ORDER — SENNOSIDES A AND B 8.6 MG/1
1 TABLET, FILM COATED ORAL NIGHTLY
Status: DISCONTINUED | OUTPATIENT
Start: 2024-02-06 | End: 2024-02-06 | Stop reason: HOSPADM

## 2024-02-06 RX ORDER — OXYCODONE HYDROCHLORIDE AND ACETAMINOPHEN 5; 325 MG/1; MG/1
1 TABLET ORAL EVERY 4 HOURS PRN
Qty: 40 TABLET | Refills: 0 | Status: SHIPPED | OUTPATIENT
Start: 2024-02-06 | End: 2024-02-13

## 2024-02-06 RX ORDER — SENNA AND DOCUSATE SODIUM 50; 8.6 MG/1; MG/1
1 TABLET, FILM COATED ORAL ONCE
Status: COMPLETED | OUTPATIENT
Start: 2024-02-06 | End: 2024-02-06

## 2024-02-06 RX ADMIN — CEFAZOLIN 2000 MG: 2 INJECTION, POWDER, FOR SOLUTION INTRAMUSCULAR; INTRAVENOUS at 01:23

## 2024-02-06 RX ADMIN — POTASSIUM CHLORIDE 20 MEQ: 1500 TABLET, EXTENDED RELEASE ORAL at 09:06

## 2024-02-06 RX ADMIN — SOTALOL HYDROCHLORIDE 80 MG: 80 TABLET ORAL at 09:07

## 2024-02-06 RX ADMIN — LEVOTHYROXINE SODIUM 112 MCG: 0.11 TABLET ORAL at 06:01

## 2024-02-06 RX ADMIN — AMLODIPINE BESYLATE 5 MG: 5 TABLET ORAL at 09:06

## 2024-02-06 RX ADMIN — OXYCODONE HYDROCHLORIDE AND ACETAMINOPHEN 1 TABLET: 5; 325 TABLET ORAL at 01:22

## 2024-02-06 RX ADMIN — OXYCODONE HYDROCHLORIDE AND ACETAMINOPHEN 1 TABLET: 5; 325 TABLET ORAL at 09:05

## 2024-02-06 RX ADMIN — CEFAZOLIN 2000 MG: 2 INJECTION, POWDER, FOR SOLUTION INTRAMUSCULAR; INTRAVENOUS at 09:10

## 2024-02-06 RX ADMIN — SODIUM CHLORIDE, POTASSIUM CHLORIDE, SODIUM LACTATE AND CALCIUM CHLORIDE: 600; 310; 30; 20 INJECTION, SOLUTION INTRAVENOUS at 02:14

## 2024-02-06 RX ADMIN — RIVAROXABAN 20 MG: 20 TABLET, FILM COATED ORAL at 09:07

## 2024-02-06 RX ADMIN — OXYCODONE HYDROCHLORIDE 5 MG: 5 TABLET ORAL at 13:05

## 2024-02-06 RX ADMIN — SENNOSIDES, DOCUSATE SODIUM 1 TABLET: 8.6; 5 TABLET ORAL at 13:07

## 2024-02-06 ASSESSMENT — PAIN DESCRIPTION - LOCATION
LOCATION: LEG
LOCATION: KNEE
LOCATION: KNEE

## 2024-02-06 ASSESSMENT — PAIN DESCRIPTION - ORIENTATION
ORIENTATION: RIGHT

## 2024-02-06 ASSESSMENT — PAIN SCALES - GENERAL
PAINLEVEL_OUTOF10: 4
PAINLEVEL_OUTOF10: 6
PAINLEVEL_OUTOF10: 6

## 2024-02-06 ASSESSMENT — PAIN - FUNCTIONAL ASSESSMENT
PAIN_FUNCTIONAL_ASSESSMENT: PREVENTS OR INTERFERES SOME ACTIVE ACTIVITIES AND ADLS
PAIN_FUNCTIONAL_ASSESSMENT: PREVENTS OR INTERFERES SOME ACTIVE ACTIVITIES AND ADLS

## 2024-02-06 ASSESSMENT — PAIN DESCRIPTION - PAIN TYPE: TYPE: ACUTE PAIN;SURGICAL PAIN

## 2024-02-06 ASSESSMENT — PAIN DESCRIPTION - FREQUENCY: FREQUENCY: INTERMITTENT

## 2024-02-06 ASSESSMENT — PAIN DESCRIPTION - DESCRIPTORS
DESCRIPTORS: ACHING
DESCRIPTORS: ACHING

## 2024-02-06 ASSESSMENT — PAIN DESCRIPTION - ONSET: ONSET: ON-GOING

## 2024-02-06 NOTE — PROGRESS NOTES
Doing well postoperatively.    Pain controlled  Patient did well with therapy yesterday    Objective:   No data found.  Afebrile and vital signs are stable    Physical Exam:     The patient is awake and alert  Resting comfortably in bed    Operative extremity:    The dressing is clean dry and intact.  Incision is intact with Dermabond dressing.  No erythema, drainage, or induration.  Calf is soft and nontender.  Sensation and motor function intact distally      LABS   CBC: No results for input(s): \"WBC\", \"HGB\", \"PLT\" in the last 72 hours.    Postoperative x-rays show well aligned prosthesis with no complications.    Assessment and Plan     1.  POD # 1 total knee replacement    1:  Physical therapy consult for mobilization   -Weight-bear as tolerated, progress as tolerated  2:  DVT prophylaxis   -Xarelto has been restarted  3:  Continue Pain Control   -Oral medications as needed, IV medication only for breakthrough pain  4.  Medical management per hospitalist service  5:  D/C Planning:    -Discharge to home later today if patient is mobilizing well with physical therapy and pain is well controlled.  -Follow-up in 2 weeks for x-rays and wound check.         Yan Martínez MD

## 2024-02-06 NOTE — CARE COORDINATION
02/06/24 0945   Service Assessment   Patient Orientation Alert and Oriented;Person;Place;Situation   Cognition Alert   History Provided By Patient   Primary Caregiver Self   Support Systems Spouse/Significant Other   Patient's Healthcare Decision Maker is: Legal Next of Kin   PCP Verified by CM Yes   Last Visit to PCP Within last year   Prior Functional Level Independent in ADLs/IADLs   Current Functional Level Mobility;Shopping;Housework   Can patient return to prior living arrangement Yes   Ability to make needs known: Good   Family able to assist with home care needs: Yes     CM into see pt to initiate a safe discharge plan. Cm introduced self and explained role of CM. Pt is kind, alert and oriented. Pt lives with her spouse who will be primary caregiver. Pt DME has a walker. Pt has PCP and insurance. CM discussed options for home care. Pt did not have preference. CM made a referral to [facility].  Discharge plan is for pt to return home with her spouse[facility]. Pt has a walker.   CM provided card and encouraged to call for any needs or concern.   CM is available if any needs arise.

## 2024-02-06 NOTE — PROGRESS NOTES
Physical Therapy Treatment Note  Name: Lashawn Duque MRN: [id number] :   1955   Date:  2024   Admission Date: 2024 Room:  [address] [place] [address]   Restrictions/Precautions: General Precautions, Fall Risk, WBAT RLE  Communication with other providers:  Pt okay to see for therapy per RN   Subjective:  Patient states:  \"I think I'm going to be sick\"   Pain:   Location, Type, Intensity (0/10 to 10/10):  Endorses significant pain with all positional changes, does not rate.   Objective:    Observation:  Pt sitting up in recliner upon PTA arrival.   Objective Measures: A & O   Treatment, including education/measures:    AM session:    Therapeutic Activity Training:   Therapeutic activity training was instructed today.  Cues were given for safety, sequence, UE/LE placement, awareness, and balance. Activities performed today included bed mobility training, sup-sit, sit-stand, SPT.    Mobility:  STS: From recliner CGA to RW, from commode CGA with increased effort.     Upon sitting on commode, pt began vomiting requiring increased time for cleanup and to resume activity.     Gait:  Pt ambulated ~10ft to restroom, 5ft from commode to recliner with RW and CGA for safety. Pt demos decreased step length, decreased gait speed, downward gaze and impaired endurance. PTA provided cues for upright gaze and for navigation.     Exercises:  Pt performed long sitting Aps x 15 ea. LE.   Pt performed long sitting Quad sets, glut sets, and heel slides 1 x 10 ea. With handout provided for increased carryover.     Safety:   Pt returned safely to recliner with chair alarm activated, call light in reach, all needs met.     PM Session:      is present for PM session.     Mobility:  STS: From recliner, CGA.     Gait:  Pt ambulated for ~70ft with RW CGA progressing to SBA. Pt demos step-through gait pattern, decreased step length, decreased gait speed, heavy WB through UEs , decreased overall endurance. PTA provided cues for upright

## 2024-02-06 NOTE — PROGRESS NOTES
Outpatient Pharmacy Progress Note for Meds-to-Beds    Total number of Prescriptions Filled: 2  The following medications were dispensed to the patient during the discharge process:  docusate sodium  oxyCODONE-acetaminophen    Additional Documentation:  Patient's family member picked-up the medication(s) in the OP Pharmacy      Thank you for letting us serve your patients.  [place] [id number]    Phone: [phone]    Fax: [phone]

## 2024-02-06 NOTE — CONSULTS
V2.0  [facility] Hospitalist Progress Note      Name:  Lashawn Duque /Age/Sex: 1955  (68 y.o. female)   MRN & CSN:  [id number] & [id number] Encounter Date/Time: 2024 11:48 AM EST    Location:  [address] [place], [place] [address] PCP: Christine Powers APRN - NP       Hospital Day: 2    Assessment and Plan:   Lashawn Duque is a 68 y.o. female  who presents with Primary osteoarthritis of right knee      Right knee osteoarthritis s/p total knee arthroplasty, POD #1-  Management as per orthopedic service. DVT prophylaxis pain management PT OT as per primary service.  Received 1 dose of Ancef postop prophylaxis.     Hyponatremia-mild, will hold hydrochlorothiazide, patient on IV hydration.  Repeat BMP    Acute blood loss anemia in setting of surgery-hemoglobin 11.6, prior hemoglobin 14 range, monitor    Paroxysmal atrial fibrillation on sotalol and Xarelto, resumed, monitor on telemetry.  Preop EKG reviewed showing normal Qtc    Essential hypertension-holding hydrochlorothiazide as noted, patient on Norvasc with parameters in place BP currently stable    NELSON on CPAP-CPAP resumed per home setting     Acquired hypothyroidism on levothyroxine        Diet ADULT DIET; Regular   DVT Prophylaxis [] Lovenox, []  Heparin, [] SCDs, [] Ambulation,  [] Eliquis, [x] Xarelto  [] Coumadin   Code Status Full Code   Disposition From: home  Expected Disposition: As per orthopedic service    Surrogate Decision Maker/ POA self     Subjective:     Chief Complaint: No chief complaint on file.       Lashawn Duque is a 68 y.o. female who presents with osteoarthritis of knee         Review of Systems:    Review of Systems    Patient states nerve block has worn off currently has some postop pain.  She denies chest pain or shortness of breath nausea vomiting decrease appetite today    Objective:     Intake/Output Summary (Last 24 hours) at 2024 1148  Last data filed at 2024 1110  Gross per 24 hour   Intake 240 ml   Output 1 ml   Net 239 ml    
[facility] ACUTE CARE PHYSICAL THERAPY EVALUATION  Lashawn Duque, 1955, 1128/1128-A, 2024    History  [place]:  There were no encounter diagnoses.  Patient  has a past medical history of A-fib (HCC), Acute kidney failure, unspecified (HCC), Diabetes mellitus (HCC), Difficult intubation, H/O 24 hour EKG monitoring, H/O echocardiogram, History of nuclear stress test, Hypertension, Hypopotassemia, NELSON on CPAP, and Thyroid disease.  Patient  has a past surgical history that includes  section; Ovary removal; Ankle fracture surgery; and Colonoscopy (2016).    Discharge Recommendation: home with assistance and OPPT    Subjective:    Patient states:  \"I've been up twice already, I'm doing pretty good.\"      Pain:  denies.      Communication with other providers:  Handoff to Sugey PINTO    Restrictions: General Precautions, Fall Risk, WBAT RLE    Home Setup/Prior level of function  Social/Functional History  Lives With: Spouse  Type of Home: House  Home Layout: Multi-level (split level, odd set-up)  Home Access: Stairs to enter without rails  Entrance Stairs - Number of Steps: 2  Entrance Stairs - Rails: None  Bathroom Shower/Tub: Tub/Shower unit  Bathroom Toilet: Standard  Home Equipment: Walker, rolling, Cane  Has the patient had two or more falls in the past year or any fall with injury in the past year?: No  ADL Assistance: Independent  Homemaking Assistance: Independent  Homemaking Responsibilities: Yes  Ambulation Assistance: Independent  Transfer Assistance: Independent  Active : Yes  Occupation: Retired    Examination of body systems (includes body structures/functions, activity/participation limitations):  Observation:  in bedside chair upon arrival and agreeable to therapy  Vision:  glasses PRN  Hearing:  WFL  Cardiopulmonary:  on room air  Cognition: WFL, see OT/SLP note for further evaluation.    Musculoskeletal  ROM R/L:  WFL LLE, impaired RLE due to recent surgery  
fracture surgery; and Colonoscopy (12/12/2016).  Allergies:   Allergies   Allergen Reactions    Seasonal      Fam HX: family history includes Arthritis in her father and mother; Diabetes in her father and mother; Heart Disease in her father; Heart Surgery in her father; Hypertension in her father and mother.  Soc HX:   Social History     Socioeconomic History    Marital status:      Spouse name: None    Number of children: None    Years of education: None    Highest education level: None   Tobacco Use    Smoking status: Never    Smokeless tobacco: Never   Vaping Use    Vaping Use: Never used   Substance and Sexual Activity    Alcohol use: Not Currently     Comment: 2 yeas sober, caffiene - 1 cup of coffee daily    Drug use: Yes     Types: Marijuana (Weed)     Comment: occ    Sexual activity: Yes     Partners: Male     Social Determinants of Health     Food Insecurity: No Food Insecurity (2/5/2024)    Hunger Vital Sign     Worried About Running Out of Food in the Last Year: Never true     Ran Out of Food in the Last Year: Never true   Transportation Needs: No Transportation Needs (2/5/2024)    PRAPARE - Transportation     Lack of Transportation (Medical): No     Lack of Transportation (Non-Medical): No   Physical Activity: Sufficiently Active (7/31/2023)    Exercise Vital Sign     Days of Exercise per Week: 7 days     Minutes of Exercise per Session: 30 min   Housing Stability: Low Risk  (2/5/2024)    Housing Stability Vital Sign     Unable to Pay for Housing in the Last Year: No     Number of Places Lived in the Last Year: 1     Unstable Housing in the Last Year: No       Medications:   Medications:    lidocaine PF        [START ON 2/6/2024] amLODIPine  5 mg Oral Daily    [START ON 2/6/2024] levothyroxine  112 mcg Oral Daily    potassium chloride  20 mEq Oral Daily    [START ON 2/6/2024] rivaroxaban  20 mg Oral Daily with breakfast    sotalol  80 mg Oral Daily    sodium chloride flush  5-40 mL IntraVENous 2

## 2024-02-06 NOTE — CARE COORDINATION
[facility] Liaison spoke with pt and pt is agreeable to c at discharge. Verified address, pcp & number.  DrLukasz Please place inpatient consult to home health needs order in Epic at [place].

## 2024-02-07 ENCOUNTER — TELEPHONE (OUTPATIENT)
Dept: FAMILY MEDICINE CLINIC | Age: 69
End: 2024-02-07

## 2024-02-07 NOTE — ANESTHESIA POSTPROCEDURE EVALUATION
Department of Anesthesiology  Postprocedure Note    Patient: Lashawn Duque  MRN: [id number]  YOB: 1955  Date of evaluation: 2/7/2024    Procedure Summary     Date: 02/05/24 Room / Location: [address]    Anesthesia Start: 0852 Anesthesia Stop: 1128    Procedure: RIGHT KNEE TOTAL ARTHROPLASTY ROBOTIC (Right: Knee) Diagnosis:       Primary osteoarthritis of right knee      Chronic pain of right knee      (Primary osteoarthritis of right knee [M17.11])      (Chronic pain of right knee [M25.561, G89.29])    Surgeons: Yan Martínez MD Responsible Provider: Taurus Rees MD    Anesthesia Type: general, MAC, regional, spinal ASA Status: 3          Anesthesia Type: No value filed.    Maged Phase I: Maged Score: 10    Maged Phase II:      Anesthesia Post Evaluation    Patient location during evaluation: PACU  Patient participation: complete - patient participated  Level of consciousness: awake  Pain score: 1  Airway patency: patent  Nausea & Vomiting: no nausea and no vomiting  Cardiovascular status: hemodynamically stable  Respiratory status: nasal cannula  Hydration status: euvolemic  Multimodal analgesia pain management approach        No notable events documented.

## 2024-02-07 NOTE — DISCHARGE SUMMARY
DISCHARGE SUMMARY:  Yan Martínez MD, MD     Date of Admission:      2/5/2024  Date of Discharge:    2/6/2024    Admission Diagnosis   Primary osteoarthritis of right knee [M17.11]  Chronic pain of right knee [M25.561, G89.29]   Discharge Diagnosis   Same    Procedure:    Right Total knee replacement 2/5/2024    Consultations:  IP CONSULT TO HOSPITALIST  IP CONSULT TO CASE MANAGEMENT  IP CONSULT TO HOME CARE NEEDS    Brief history and hospital stay.    Lashawn Duque is a 68 y.o. female who underwent total knee replacement procedure without complication.  Lashawn Duque was admitted to the floor following surgery.    Appropriate consults were obtained as outlined in progress notes. The patient’s diet was advanced as tolerated.  The patient remained hemodynamically stable and neurovascularly intact in the lower extremity throughout the hospital course.    On the day of discharge the patient's calf remained soft and showed no evidence of DVT.      Physical therapy and occupational therapy were consulted.  The patient progressed well with PT/OT throughout the stay.      DVT prophylaxis was initiated and will continue for 2 weeks.    The patients pain was controlled initially with IV pain medications and then was transitioned to oral pain medications prior to discharge    The patient was discharged to Home and will continue to follow the precautions outlined to them by us and PT/OT.     Condition on Discharge: Stable    Medications       Medication List        START taking these medications      docusate sodium 100 MG capsule  Commonly known as: COLACE  Take 1 capsule by mouth daily as needed for Constipation     oxyCODONE-acetaminophen 5-325 MG per tablet  Commonly known as: Percocet  Take 1 tablet by mouth every 4 hours as needed for Pain for up to 7 days. Intended supply: 7 days. Take lowest dose possible to manage pain Max Daily Amount: 6 tablets            CONTINUE taking

## 2024-02-07 NOTE — TELEPHONE ENCOUNTER
[facility] Transitions Initial Follow Up Call    Call within 2 business days of discharge: Yes     Patient: Lashawn Duque Patient : 1955   MRN: [id number]  Reason for Admission: Primary Osteoarthritis of right knee  Discharge Date: 24 RARS: Readmission Risk Score: 10.3       Spoke with: Did not answer, left voicemail to return call to this office.    Discharge department/facility: [facility]    Non-face-to-face services provided:  Scheduled appointment with PCP-attempted 2024@2:45pm    Follow Up  Future Appointments   Date Time Provider Department Center   2024 10:00 AM Denver Bhatti PA-C [facility]   3/19/2024 10:45 AM Comfort Parmar APRN - CNP [facility]   3/19/2024  1:50 PM Yan Martínez MD [facility]   2024  1:00 PM Erasmo Guillory MD [facility]   2024  1:40 PM Denver Bhatti PA-C [facility]   2024  9:00 AM Rocky Fisher MD [facility]       Carrol Ortiz LPN

## 2024-02-08 ENCOUNTER — TELEPHONE (OUTPATIENT)
Dept: FAMILY MEDICINE CLINIC | Age: 69
End: 2024-02-08

## 2024-02-08 NOTE — TELEPHONE ENCOUNTER
[facility] Transitions Initial Follow Up Call    Call within 2 business days of discharge: Yes     Patient: Lashawn Duque Patient : 1955   MRN: [id number]  Reason for Admission: Primary Osteoarthritis  Discharge Date: 24 RARS: Readmission Risk Score: 10.3       Spoke with: Lashawn Duque    Discharge department/facility: 2024    Non-face-to-face services provided:  Scheduled appointment with PCP- @ 1120    Follow Up  Future Appointments   Date Time Provider Department Center   2024 10:00 AM Denver Bhatti PA-C [facility]   3/19/2024 10:45 AM Comfort Parmar, APRN - CNP [facility]   3/19/2024  1:50 PM Yan Martínez MD [facility]   2024  1:00 PM Erasmo Guillory MD [facility]   2024  1:40 PM Denver Bhatti PA-C [facility]   2024  9:00 AM Rocky Fisher MD [facility]       Carrol Ortiz LPN

## 2024-02-15 ENCOUNTER — OFFICE VISIT (OUTPATIENT)
Dept: FAMILY MEDICINE CLINIC | Age: 69
End: 2024-02-15
Payer: COMMERCIAL

## 2024-02-15 VITALS
RESPIRATION RATE: 18 BRPM | WEIGHT: 228.2 LBS | DIASTOLIC BLOOD PRESSURE: 65 MMHG | SYSTOLIC BLOOD PRESSURE: 110 MMHG | HEART RATE: 73 BPM | HEIGHT: 64 IN | OXYGEN SATURATION: 95 % | BODY MASS INDEX: 38.96 KG/M2

## 2024-02-15 DIAGNOSIS — Z96.651 S/P TOTAL KNEE ARTHROPLASTY, RIGHT: ICD-10-CM

## 2024-02-15 DIAGNOSIS — Z09 HOSPITAL DISCHARGE FOLLOW-UP: Primary | ICD-10-CM

## 2024-02-15 PROCEDURE — 1111F DSCHRG MED/CURRENT MED MERGE: CPT | Performed by: PHYSICIAN ASSISTANT

## 2024-02-15 PROCEDURE — 3078F DIAST BP <80 MM HG: CPT | Performed by: PHYSICIAN ASSISTANT

## 2024-02-15 PROCEDURE — 1123F ACP DISCUSS/DSCN MKR DOCD: CPT | Performed by: PHYSICIAN ASSISTANT

## 2024-02-15 PROCEDURE — 3074F SYST BP LT 130 MM HG: CPT | Performed by: PHYSICIAN ASSISTANT

## 2024-02-15 PROCEDURE — 99214 OFFICE O/P EST MOD 30 MIN: CPT | Performed by: PHYSICIAN ASSISTANT

## 2024-02-15 NOTE — PROGRESS NOTES
fatigue    IFG (impaired fasting glucose)    Hypercoagulable state due to paroxysmal atrial fibrillation (HCC)    TEZ (acute kidney injury) (HCC)    PAF (paroxysmal atrial fibrillation) (HCC)    S/P total knee arthroplasty, right       Medications listed as ordered at the time of discharge from hospital     Medication List            Accurate as of February 15, 2024 12:31 PM. If you have any questions, ask your nurse or doctor.                CONTINUE taking these medications      amLODIPine 5 MG tablet  Commonly known as: NORVASC  Take 1 tablet by mouth daily     docusate sodium 100 MG capsule  Commonly known as: COLACE  Take 1 capsule by mouth daily as needed for Constipation     indapamide 2.5 MG tablet  Commonly known as: LOZOL  TAKE 1 TAB BY MOUTH IN THE MORNING     levothyroxine 112 MCG tablet  Commonly known as: SYNTHROID  Take 1 tablet by mouth once daily     potassium chloride 20 MEQ extended release tablet  Commonly known as: KLOR-CON M  Take 1 tablet by mouth daily for 7 days     rivaroxaban 20 MG Tabs tablet  Commonly known as: Xarelto  Take 1 tablet by mouth daily (with breakfast)     sotalol 80 MG tablet  Commonly known as: BETAPACE  Take 1 tablet by mouth daily                Medications marked \"taking\" at this time  Outpatient Medications Marked as Taking for the 2/15/24 encounter (Office Visit) with Germán Rodrigues PA   Medication Sig Dispense Refill    docusate sodium (COLACE) 100 MG capsule Take 1 capsule by mouth daily as needed for Constipation 30 capsule 0    sotalol (BETAPACE) 80 MG tablet Take 1 tablet by mouth daily 90 tablet 0    rivaroxaban (XARELTO) 20 MG TABS tablet Take 1 tablet by mouth daily (with breakfast) 90 tablet 1    levothyroxine (SYNTHROID) 112 MCG tablet Take 1 tablet by mouth once daily 90 tablet 3    amLODIPine (NORVASC) 5 MG tablet Take 1 tablet by mouth daily 90 tablet 3    indapamide (LOZOL) 2.5 MG tablet TAKE 1 TAB BY MOUTH IN THE MORNING 90 tablet 3        Medications

## 2024-02-27 ENCOUNTER — OFFICE VISIT (OUTPATIENT)
Dept: ORTHOPEDIC SURGERY | Age: 69
End: 2024-02-27

## 2024-02-27 VITALS
HEART RATE: 72 BPM | RESPIRATION RATE: 14 BRPM | HEIGHT: 64 IN | BODY MASS INDEX: 38.93 KG/M2 | TEMPERATURE: 97.5 F | WEIGHT: 228 LBS | OXYGEN SATURATION: 97 %

## 2024-02-27 DIAGNOSIS — M17.11 PRIMARY OSTEOARTHRITIS OF RIGHT KNEE: ICD-10-CM

## 2024-02-27 DIAGNOSIS — Z96.651 S/P TOTAL KNEE ARTHROPLASTY, RIGHT: Primary | ICD-10-CM

## 2024-02-27 PROCEDURE — 99024 POSTOP FOLLOW-UP VISIT: CPT

## 2024-02-27 RX ORDER — CYCLOBENZAPRINE HCL 10 MG
10 TABLET ORAL 3 TIMES DAILY PRN
Qty: 30 TABLET | Refills: 0 | Status: SHIPPED | OUTPATIENT
Start: 2024-02-27 | End: 2024-03-08

## 2024-02-27 RX ORDER — TIZANIDINE 2 MG/1
2 TABLET ORAL EVERY 8 HOURS PRN
Qty: 10 TABLET | Refills: 0 | Status: CANCELLED | OUTPATIENT
Start: 2024-02-27 | End: 2024-03-08

## 2024-02-27 RX ORDER — OXYCODONE HYDROCHLORIDE AND ACETAMINOPHEN 5; 325 MG/1; MG/1
1 TABLET ORAL EVERY 6 HOURS PRN
Qty: 28 TABLET | Refills: 0 | Status: CANCELLED | OUTPATIENT
Start: 2024-02-27 | End: 2024-03-05

## 2024-02-27 ASSESSMENT — ENCOUNTER SYMPTOMS
SHORTNESS OF BREATH: 0
RHINORRHEA: 0
COUGH: 0
NAUSEA: 0
FACIAL SWELLING: 0
BACK PAIN: 0

## 2024-02-27 NOTE — PATIENT INSTRUCTIONS
Continue doing physical therapy  Continue using walker or cane  Continue weight bear as tolerated  Continue range of motion and exercises as instruction  Ice and elevate as needed  Tylenol or Motrin for pain  Follow up in 3 weeks with Dr. Yan Martínez.    If you have any question post operatively. Please contact office to speak with [facility].     We are committed to providing you the best care possible.  If you receive a survey after visiting one of our offices, please take time to share your experience concerning your physician office visit.  These surveys are confidential and no health information about you is shared.  We are eager to improve for you and we are counting on your feedback to help make that happen.

## 2024-02-27 NOTE — PROGRESS NOTES
2/27/2024   Chief Complaint   Patient presents with    Post-Op Check     Right TKA DOS 01/29/2024        History of Present Illness:                             Lashawn Duque is a 68 y.o. female returning to the office today for continued postoperative management regarding her right TKA, DOS 1/29/2024.  Patient states she has been doing very well with her healing process.  She states she is ceased using a walker for ambulation.  She is currently using a cane for longer distances.  Patient notes she has been working with physical therapy at home but wishes to transition to onsite therapy in the near future.  She has some mild swelling as well as some spasms in her quadricep.  She states that this affects her sleep and she is hoping there is something that can help her with the spasms and sleep.  The patient denies any distal paresthesias or calf pain.    Medical History  Patient's medications, allergies, past medical, surgical, social and family histories were reviewed and updated as appropriate.    Review of Systems   Constitutional:  Negative for fever.   HENT:  Negative for facial swelling and rhinorrhea.    Respiratory:  Negative for cough and shortness of breath.    Cardiovascular:  Negative for chest pain.   Gastrointestinal:  Negative for nausea.   Musculoskeletal:  Positive for arthralgias, gait problem and joint swelling. Negative for back pain, myalgias, neck pain and neck stiffness.   Skin:  Negative for pallor and rash.   Neurological:  Negative for facial asymmetry and speech difficulty.   Psychiatric/Behavioral:  Negative for agitation and confusion.                                                Examination:  General Exam:  Vitals: Pulse 72   Temp 97.5 °F (36.4 °C) (Infrared)   Resp 14   Ht 1.626 m (5' 4\")   Wt 103.4 kg (228 lb)   LMP  (LMP Unknown)   SpO2 97%   BMI 39.14 kg/m²    Physical Exam  Constitutional:       General: She is not in acute distress.     Appearance: Normal

## 2024-03-08 ENCOUNTER — HOSPITAL ENCOUNTER (OUTPATIENT)
Dept: PHYSICAL THERAPY | Age: 69
Setting detail: THERAPIES SERIES
Discharge: HOME | End: 2024-03-08
Attending: ORTHOPAEDIC SURGERY
Payer: COMMERCIAL

## 2024-03-08 PROCEDURE — 97164 PT RE-EVAL EST PLAN CARE: CPT

## 2024-03-08 PROCEDURE — 97110 THERAPEUTIC EXERCISES: CPT

## 2024-03-08 NOTE — PROGRESS NOTES
[facility] Outpatient Physical Therapy  [address] [address]  Phone: [phone]  Fax: [phone]      Physician: Dr Yan Martínez MD      From: Lauren Osman, PT, DPT     Patient: Lashawn Duque                    : 1955  Diagnosis:   Primary osteoarthritis of right knee [M17.11]  Chronic pain of right knee [M25.561, G89.29]     Date: 3/8/2024  Treatment Diagnosis: M62.81 muscle weakness, R26.81 abnormality of gait.       [x]  Progress Note                []  Discharge Note    Total Visits to date:   2 Cancels/No-shows to date:  0    Subjective: Pt reports she has more stiffness than pain.     Plan of Care/Treatment to date:  [x] Therapeutic Exercise    [x] Modalities:  [x] Therapeutic Activity     [] Ultrasound  [x] Electric Stimulation  [x] Gait Training      [x] Cervical Traction    [] Lumbar Traction  [x] Neuromuscular Re-education  [] Cold/hotpack [] Iontophoresis  [x] Instruction in HEP      Other:  [x] Manual Therapy       [x]  Vasopneumatic  [] Aquatic Therapy       []                          Objective/Significant Findings At Last Visit/Comments:  LEFs:  = 38.75% ability = 61.25% disability   2MWT: 314.9 feet  AROM  LE  Knee:     Right   Knee flexion       110 degrees AAROM  105 degrees AROM   Knee extension      Lacking 2 degrees  Pt used glutes not quads     Assessment: Pt is a pleasant 69 yo female who would benefit from skilled PT to address decreased ROM, strength, and functional mobility following a R TKA.      Goal Status:  [x] Achieved [] Partially Achieved  [] Not Achieved     Short term goal 1: Patient will verbalize  and demonstrate understanding of initial post op phase exercises  - MET, discharge goals.   Short term goal 2 : Patient will demonstrate understanding of proper use of walker with WBAT on affected limb - MET, discharge goals.   Short term goal 3: Patient will demonstrate understanding of proper sequence for going up/down steps to

## 2024-03-08 NOTE — FLOWSHEET NOTE
Outpatient Physical Therapy  [place]           [] Phone: [phone]   Fax: [phone]  [place]           [x] Phone: [phone]   Fax: [phone]        Physical Therapy Daily Treatment Note  Date:  3/8/2024    Patient Name:  Lashawn Duque    :  1955  MRN: [id number]  Restrictions/Precautions: No data recorded   Diagnosis:   Primary osteoarthritis of right knee [M17.11]  Chronic pain of right knee [M25.561, G89.29]    Date of Injury/Surgery: R TKA 24  Treatment Diagnosis:   M62.81 muscle weakness, R26.81 abnormality of gait.   Insurance/Certification information:  [facility]  Referring Physician:  Yan Martínez MD     PCP: Germán Rodrigues PA  Next Doctor Visit:  3/19/24  Plan of care signed (Y/N):  updated POC sent  Outcome Measure: LEFs:  = 38.75% ability = 61.25% disability   Visit# / total visits:   1/BOMN  Pain level: 3/10   Goals:     Patient goals:  \"to return to normal activities\"    Updated Goals to be achieved by 2024 :  1) Pt will demonstrate I with current HEP as prescribed in order to improve ROM and strength.   2) Pt will demonstrate AROM R knee flexion to at least 120 degrees in order to improve ROM.  3) Pt will demonstrate R LE strength to at least 4+/5 in order to improve strength.   4) Pt will demonstrate the ability to safely complete at least 350 feet in the 2 minute walk test with no AD in order to improve functional mobility.   5) Pt will demonstrate the ability to safely ambulate up and down the steps with a reciprocal gait and no more than 1 HHA in 2/2 trials in order to improve functional mobility.     Subjective:  Pt surgery was delayed 1 week secondary to the robot being down. Pt had a R TKA 24 and went home 24.  Pt reports she had a bad reaction to the pain meds. She reports she has only been able to take tylenol. Pt reports she has more stiffness and swelling compared to pain. Pt reports she [facility] PT until last week. She reports she has 
mother, resident team

## 2024-03-12 ENCOUNTER — HOSPITAL ENCOUNTER (OUTPATIENT)
Dept: PHYSICAL THERAPY | Age: 69
Setting detail: THERAPIES SERIES
Discharge: HOME | End: 2024-03-12
Attending: ORTHOPAEDIC SURGERY
Payer: COMMERCIAL

## 2024-03-12 PROCEDURE — 97110 THERAPEUTIC EXERCISES: CPT

## 2024-03-12 PROCEDURE — 97530 THERAPEUTIC ACTIVITIES: CPT

## 2024-03-12 PROCEDURE — 97112 NEUROMUSCULAR REEDUCATION: CPT

## 2024-03-12 NOTE — FLOWSHEET NOTE
Outpatient Physical Therapy  [place]           [] Phone: [phone]   Fax: [phone]  [place]           [x] Phone: [phone]   Fax: [phone]        Physical Therapy Daily Treatment Note  Date:  3/12/2024    Patient Name:  Lashawn Duque    :  1955  MRN: [id number]  Restrictions/Precautions: No data recorded   Diagnosis:   Primary osteoarthritis of right knee [M17.11]  Chronic pain of right knee [M25.561, G89.29]    Date of Injury/Surgery: R TKA 24  Treatment Diagnosis:   M62.81 muscle weakness, R26.81 abnormality of gait.   Insurance/Certification information:  [facility]  Referring Physician:  Yan Martínez MD     PCP: Germán Rodrigues PA  Next Doctor Visit:  3/19/24  Plan of care signed (Y/N):  updated POC sent  Outcome Measure: LEFs:  = 38.75% ability = 61.25% disability   Visit# / total visits:   2/BOMN  Pain level: 0/10   Goals:     Patient goals:  \"to return to normal activities\"    Updated Goals to be achieved by 2024 :  1) Pt will demonstrate I with current HEP as prescribed in order to improve ROM and strength.   2) Pt will demonstrate AROM R knee flexion to at least 120 degrees in order to improve ROM.  3) Pt will demonstrate R LE strength to at least 4+/5 in order to improve strength.   4) Pt will demonstrate the ability to safely complete at least 350 feet in the 2 minute walk test with no AD in order to improve functional mobility.   5) Pt will demonstrate the ability to safely ambulate up and down the steps with a reciprocal gait and no more than 1 HHA in 2/2 trials in order to improve functional mobility.     Subjective:  Not really having any knee pain, mostly just the stiffness.    Any changes in Ambulatory Summary Sheet?  None    Objective:    AROM  LE  Knee:    Right   Knee flexion       110 degrees AAROM  108 degrees AROM   Knee extension      Lacking 2 degrees     Exercises: (No more than 4 columns)   Exercise/Equipment Date: 3/8/24 Date 3/12/2024

## 2024-03-15 ENCOUNTER — HOSPITAL ENCOUNTER (OUTPATIENT)
Dept: PHYSICAL THERAPY | Age: 69
Setting detail: THERAPIES SERIES
Discharge: HOME | End: 2024-03-15
Attending: ORTHOPAEDIC SURGERY
Payer: COMMERCIAL

## 2024-03-15 PROCEDURE — 97110 THERAPEUTIC EXERCISES: CPT

## 2024-03-15 NOTE — FLOWSHEET NOTE
Neuromuscular Re-education    [] Cold/hotpack [] Iontophoresis   [x] Instruction in HEP      [] Vasopneumatic   [] Dry Needling    [x] Manual Therapy               [] Aquatic Therapy              Electronically signed by:  Lauren Osman PT, DPT  [id number]   3/15/2024, 12:59 PM

## 2024-03-18 ENCOUNTER — HOSPITAL ENCOUNTER (OUTPATIENT)
Dept: PHYSICAL THERAPY | Age: 69
Setting detail: THERAPIES SERIES
Discharge: HOME | End: 2024-03-18
Attending: ORTHOPAEDIC SURGERY
Payer: COMMERCIAL

## 2024-03-18 PROCEDURE — 97110 THERAPEUTIC EXERCISES: CPT

## 2024-03-18 PROCEDURE — 97530 THERAPEUTIC ACTIVITIES: CPT

## 2024-03-18 NOTE — FLOWSHEET NOTE
Outpatient Physical Therapy  [place]           [] Phone: [phone]   Fax: [phone]  [place]           [x] Phone: [phone]   Fax: [phone]        Physical Therapy Daily Treatment Note  Date:  3/18/2024    Patient Name:  Lashawn Duque    :  1955  MRN: [id number]  Restrictions/Precautions: No data recorded   Diagnosis:   Primary osteoarthritis of right knee [M17.11]  Chronic pain of right knee [M25.561, G89.29]    Date of Injury/Surgery: R TKA 24  Treatment Diagnosis:   M62.81 muscle weakness, R26.81 abnormality of gait.   Insurance/Certification information:  [facility]  Referring Physician:  Yan Martínez MD     PCP: Germán Rodrigues PA  Next Doctor Visit:  3/19/24  Plan of care signed (Y/N):  updated POC sent  Outcome Measure: LEFs:  = 38.75% ability = 61.25% disability   Visit# / total visits:   4/BOMN  Pain level: 0/10   Goals:     Patient goals:  \"to return to normal activities\"    Updated Goals to be achieved by 2024 :  1) Pt will demonstrate I with current HEP as prescribed in order to improve ROM and strength.   2) Pt will demonstrate AROM R knee flexion to at least 120 degrees in order to improve ROM.  3) Pt will demonstrate R LE strength to at least 4+/5 in order to improve strength.   4) Pt will demonstrate the ability to safely complete at least 350 feet in the 2 minute walk test with no AD in order to improve functional mobility.   5) Pt will demonstrate the ability to safely ambulate up and down the steps with a reciprocal gait and no more than 1 HHA in 2/2 trials in order to improve functional mobility.     Subjective:  Pt reports pain 0/10 today and just some stiffness/achiness.            Any changes in Ambulatory Summary Sheet?  None    Objective:    AROM  LE  Knee:    Right   Knee flexion       122 degrees AAROM        112 degrees AROM   Knee extension       -3* degrees     Exercises: (No more than 4 columns)   Exercise/Equipment Date 3/12/2024

## 2024-03-19 ENCOUNTER — OFFICE VISIT (OUTPATIENT)
Dept: ORTHOPEDIC SURGERY | Age: 69
End: 2024-03-19

## 2024-03-19 ENCOUNTER — OFFICE VISIT (OUTPATIENT)
Dept: CARDIOLOGY CLINIC | Age: 69
End: 2024-03-19
Payer: COMMERCIAL

## 2024-03-19 VITALS — TEMPERATURE: 98.1 F | RESPIRATION RATE: 17 BRPM | OXYGEN SATURATION: 98 % | HEART RATE: 70 BPM

## 2024-03-19 VITALS
WEIGHT: 232.4 LBS | BODY MASS INDEX: 39.67 KG/M2 | HEART RATE: 70 BPM | HEIGHT: 64 IN | SYSTOLIC BLOOD PRESSURE: 118 MMHG | DIASTOLIC BLOOD PRESSURE: 72 MMHG

## 2024-03-19 DIAGNOSIS — Z96.651 S/P TOTAL KNEE ARTHROPLASTY, RIGHT: Primary | ICD-10-CM

## 2024-03-19 DIAGNOSIS — I48.0 PAF (PAROXYSMAL ATRIAL FIBRILLATION) (HCC): ICD-10-CM

## 2024-03-19 DIAGNOSIS — I48.0 HYPERCOAGULABLE STATE DUE TO PAROXYSMAL ATRIAL FIBRILLATION (HCC): ICD-10-CM

## 2024-03-19 DIAGNOSIS — Z79.899 ENCOUNTER FOR MONITORING SOTALOL THERAPY: Primary | ICD-10-CM

## 2024-03-19 DIAGNOSIS — G47.33 OSA ON CPAP: ICD-10-CM

## 2024-03-19 DIAGNOSIS — I10 ESSENTIAL HYPERTENSION: ICD-10-CM

## 2024-03-19 DIAGNOSIS — D68.69 HYPERCOAGULABLE STATE DUE TO PAROXYSMAL ATRIAL FIBRILLATION (HCC): ICD-10-CM

## 2024-03-19 DIAGNOSIS — Z51.81 ENCOUNTER FOR MONITORING SOTALOL THERAPY: Primary | ICD-10-CM

## 2024-03-19 PROCEDURE — 93000 ELECTROCARDIOGRAM COMPLETE: CPT | Performed by: NURSE PRACTITIONER

## 2024-03-19 PROCEDURE — 3078F DIAST BP <80 MM HG: CPT | Performed by: NURSE PRACTITIONER

## 2024-03-19 PROCEDURE — 99024 POSTOP FOLLOW-UP VISIT: CPT | Performed by: ORTHOPAEDIC SURGERY

## 2024-03-19 PROCEDURE — 3074F SYST BP LT 130 MM HG: CPT | Performed by: NURSE PRACTITIONER

## 2024-03-19 PROCEDURE — 99214 OFFICE O/P EST MOD 30 MIN: CPT | Performed by: NURSE PRACTITIONER

## 2024-03-19 PROCEDURE — 1123F ACP DISCUSS/DSCN MKR DOCD: CPT | Performed by: NURSE PRACTITIONER

## 2024-03-19 RX ORDER — SOTALOL HYDROCHLORIDE 80 MG/1
80 TABLET ORAL DAILY
Qty: 90 TABLET | Refills: 1 | Status: SHIPPED | OUTPATIENT
Start: 2024-03-19 | End: 2024-09-15

## 2024-03-19 ASSESSMENT — ENCOUNTER SYMPTOMS
PHOTOPHOBIA: 0
ABDOMINAL PAIN: 0
SINUS PRESSURE: 0
COLOR CHANGE: 0
ABDOMINAL DISTENTION: 0
COUGH: 0
BLOOD IN STOOL: 0
SHORTNESS OF BREATH: 0
SINUS PAIN: 0

## 2024-03-19 NOTE — PROGRESS NOTES
Value Date    CHOL 196 06/22/2021    TRIG 130 06/22/2021    HDL 52 06/22/2021    LDLCALC 118 (H) 06/22/2021    LDLDIRECT 141 (H) 02/24/2021     PT/INR: No results found for: \"INR\"     BMP:    Lab Results   Component Value Date     (L) 02/06/2024    K 3.6 02/06/2024    CL 96 (L) 02/06/2024    CO2 25 02/06/2024    BUN 17 02/06/2024     CMP:   Lab Results   Component Value Date    AST 13 (L) 01/17/2024    ALT 14 01/17/2024    PROT 7.0 01/17/2024    BILITOT 0.5 01/17/2024    ALKPHOS 77 01/17/2024     TSH:    Lab Results   Component Value Date/Time    TSH 1.60 07/31/2023 10:49 AM       EKGINTERPRETATION - EKG Interpretation:  sinus rhythm      Vitals:    03/19/24 1049   BP: 118/72   Site: Left Upper Arm   Position: Sitting   Cuff Size: Large Adult   Pulse: 70   Weight: 105.4 kg (232 lb 6.4 oz)   Height: 1.626 m (5' 4\")          IMPRESSION / RECOMMENDATIONS:     Sotalol monitoring therapy  PAF and flutter   Hypercoagulable state  History of COVID  Htn  NELSON  Obesity BMI 39      EKG obtained and reviewed  Patient noted to be in sinus rhythm  QTc within range to continue sotalol 80 mg daily  Patient is taking 1 time a day sotalol due to kidney disease  She reports that she has had 3 episodes of atrial fibrillation since starting the Tikosyn.  She states that she feels each episode was triggered by something  She would like to continue to monitoring symptoms before considering an ablation  For now we will continue sotalol 80 mg daily    Patient denies issues with bleeding.  Continue Xarelto 20 mg daily    Vitals:    03/19/24 1049   BP: 118/72   Pulse: 70     Blood pressure stable.  Continue amlodipine 5 mg daily and Lozol 2.5 mg daily      Patient is compliant with CPAP    Recommend regular exercise and dietary modifications for management of obesity    Patient to follow-up in 6 months or sooner if needed

## 2024-03-19 NOTE — PATIENT INSTRUCTIONS
We are committed to providing you the best care possible.    If you receive a survey after visiting one of our offices, please take time to share your experience concerning your physician office visit.  These surveys are confidential and no health information about you is shared.    We are eager to improve for you and we are counting on your feedback to help make that happen.    **It is YOUR responsibilty to bring medication bottles and/or updated medication list to EACH APPOINTMENT. This will allow us to better serve you and all your healthcare needs**  Thank you for allowing us to care for you today!   We want to ensure we can follow your treatment plan and we strive to give you the best outcomes and experience possible.   If you ever have a life threatening emergency and call 911 - for an ambulance (EMS)   Our providers can only care for you at:   [facility] or [facility].   Even if you have someone take you or you drive yourself we can only care for you in a [facility] facility. Our providers are not setup at the other healthcare locations!   Please be informed that if you contact our office outside of normal business hours the physician on call cannot help with any scheduling or rescheduling issues, procedure instruction questions or any type of medication issue.    We advise you for any urgent/emergency that you go to the nearest emergency room!    PLEASE CALL OUR OFFICE DURING NORMAL BUSINESS HOURS    Monday - Friday   8 am to 5 pm    [place]: [phone]    [place]: [phone]    [place]:  [phone]

## 2024-03-19 NOTE — PROGRESS NOTES
Patient seen in office today for: PO 6wk Right TKA, DOS 1/29/2024    Patient reports 3/10 pain. Patient is having minimum pain and ROM is doing well.      Patient continues physical therapy

## 2024-03-19 NOTE — PROGRESS NOTES
3/19/2024   Chief Complaint   Patient presents with    Post-Op Check     PO 6wk Right TKA, DOS 1/29/2024        History of Present Illness:                             Lashawn Duque is a 68 y.o. female who returns today for follow-up of her right total knee replacement.  She is doing very well with her recovery.  He is making good progress with therapy.    Patient seen in office today for: PO 6wk Right TKA, DOS 1/29/2024     Patient reports 3/10 pain. Patient is having minimum pain and ROM is doing well.       Patient continues physical therapy        Medical History  Patient's medications, allergies, past medical, surgical, social and family histories were reviewed and updated as appropriate.      Review of Systems                                            Examination:  General Exam:  Vitals: Pulse 70   Temp 98.1 °F (36.7 °C)   Resp 17   LMP  (LMP Unknown)   SpO2 98%    Physical Exam       Right Lower Extremity:    The incision is well-healed.  No erythema, drainage, or induration.  Minimal resolving soft tissue swelling present throughout the soft tissues of the knee.  Range of motion is present from full extension to 120 degrees of flexion.  Calf is soft and nontender.  Negative Homans sign.  Sensation and motor function is intact at the knee and ankle.      Diagnostic testing:  X-rays reviewed in office, I independently reviewed the films in the office today:     Previous x-rays show well aligned total knee replacement with no complications    Office Procedures:  No orders of the defined types were placed in this encounter.      Assessment and Plan  Right total knee replacement    The patient is healing well and making steady improvements in the knee with physical therapy.    I have recommended continuing with rehabilitation as a home program.  She will continue with formal therapy visits over the next 4 weeks or so and then transition to full home program at the guidance of therapy.    The patient

## 2024-03-22 ENCOUNTER — HOSPITAL ENCOUNTER (OUTPATIENT)
Dept: PHYSICAL THERAPY | Age: 69
Setting detail: THERAPIES SERIES
Discharge: HOME | End: 2024-03-22
Attending: ORTHOPAEDIC SURGERY
Payer: COMMERCIAL

## 2024-03-22 PROCEDURE — 97110 THERAPEUTIC EXERCISES: CPT

## 2024-03-22 PROCEDURE — 97530 THERAPEUTIC ACTIVITIES: CPT

## 2024-03-22 NOTE — FLOWSHEET NOTE
Exercises: (No more than 4 columns)   Exercise/Equipment Date: 3/15/24 3/18/24  (4) 3/22/2024             WARM UP       NuStep    X5' S8 no UE  Lvl 3 S8  No arms Lv3 6' S8  No arms Lv3 5'    Bike  X10' S3  S3 10' S3 10'    TABLE       Quad set 1x10 5\" heel on 1/2 FR following prop 10x10\" during heel prop 10x10\" during heel prop    SAQ 1x15 5\" med roll Small roll 15x5\" Small roll 15x5\"    Heel Slides 1x8 10\" Without strap 5x10\"  With strap 5x10\" Without strap 5x10\"  With strap 5x10\"    Heel Prop X3' on 1/2 FR Blue foam 5' Blue foam 5'    Seated dangle and flexion w/ OP from L LE 15x dangle  3x30\" OP hold LAQ/Flex w/OP 15x5\" LAQ/Flex w/OP 15x5\"              STANDING       Knee flexion stretch on step 2x30\" 2x30\" Not today    Hurdles forward and lateral 3 laps ea way 3 laps ea 3 laps ea     Heel cord stretch X1' on wedge 1' on wedge  1 min on wedge    TKE 4\" 1x15 4\" 15x 4\" 10x with heel tap    4 way hip YTB 1x15 B   15x 1# RLE  0# LLE  15x 1# RLE  0# LLE    Forward step ups 6\" 1x10 B LE OKTC 6\" 2x10  OKTC 6\" 2x10  OKTC    Lateral step ups 6\" 1x10 R LE 6\" 10x RLE 6\" 15x RLE    Step Downs 4\" 1x10 R LE 4\" 10x RLE  4\" 10x RLE  6\"               PROPRIOCEPTION       Foam pad marching       Foam pad NBOS                            MODALITIES                       Other Therapeutic Activities/Education:  Pt educated on PT findings, plan, prognosis, and HEP. Pt expressed understanding and asked appropriate questions.     Home Exercise Program:  3/8 - D/C SLR.  Continue heel slides with 5\" hold and quad set and SAQ with focus on quads not glutes.     Manual Treatments:  none    Modalities:  none    Communication with other providers:  updated POC faxed .    Assessment:  (Response towards treatment session) (Pain Rating)  No knee pain at the end of treatment.  ROM measurements remained the same.       Plan for Next Session: Continue per POC     Time In / Time Out:  1031/1119    Timed Code/Total Treatment Minutes:   48  2te

## 2024-03-26 ENCOUNTER — HOSPITAL ENCOUNTER (OUTPATIENT)
Dept: PHYSICAL THERAPY | Age: 69
Setting detail: THERAPIES SERIES
Discharge: HOME | End: 2024-03-26
Attending: ORTHOPAEDIC SURGERY
Payer: COMMERCIAL

## 2024-03-26 PROCEDURE — 97112 NEUROMUSCULAR REEDUCATION: CPT

## 2024-03-26 PROCEDURE — 97530 THERAPEUTIC ACTIVITIES: CPT

## 2024-03-26 PROCEDURE — 97110 THERAPEUTIC EXERCISES: CPT

## 2024-04-01 ENCOUNTER — HOSPITAL ENCOUNTER (OUTPATIENT)
Dept: PHYSICAL THERAPY | Age: 69
Setting detail: THERAPIES SERIES
Discharge: HOME | End: 2024-04-01
Attending: ORTHOPAEDIC SURGERY
Payer: COMMERCIAL

## 2024-04-01 PROCEDURE — 97112 NEUROMUSCULAR REEDUCATION: CPT

## 2024-04-01 PROCEDURE — 97110 THERAPEUTIC EXERCISES: CPT

## 2024-04-01 NOTE — FLOWSHEET NOTE
pm    Timed Code/Total Treatment Minutes: 57'/1 neuro, 3 therapeutic exercise    Next Progress Note due:  4/19/24    Plan of Care Interventions:  [x] Therapeutic Exercise  [x] Modalities:  [x] Therapeutic Activity     [] Ultrasound  [x] Estim  [x] Gait Training      [] Cervical Traction [] Lumbar Traction  [x] Neuromuscular Re-education    [] Cold/hotpack [] Iontophoresis   [x] Instruction in HEP      [] Vasopneumatic   [] Dry Needling    [x] Manual Therapy               [] Aquatic Therapy              Electronically signed by:  Lauren Osman PT, DPT [id number]   4/1/2024, 2:31 PM

## 2024-04-05 ENCOUNTER — HOSPITAL ENCOUNTER (OUTPATIENT)
Dept: PHYSICAL THERAPY | Age: 69
Setting detail: THERAPIES SERIES
Discharge: HOME | End: 2024-04-05
Attending: ORTHOPAEDIC SURGERY
Payer: COMMERCIAL

## 2024-04-05 PROCEDURE — 97530 THERAPEUTIC ACTIVITIES: CPT

## 2024-04-05 PROCEDURE — 97110 THERAPEUTIC EXERCISES: CPT

## 2024-04-05 PROCEDURE — 97112 NEUROMUSCULAR REEDUCATION: CPT

## 2024-04-05 NOTE — FLOWSHEET NOTE
Outpatient Physical Therapy  [place]           [] Phone: [phone]   Fax: [phone]  [place]           [x] Phone: [phone]   Fax: [phone]        Physical Therapy Daily Treatment Note  Date:  2024    Patient Name:  Lashawn Duque    :  1955  MRN: [id number]  Restrictions/Precautions: No data recorded   Diagnosis:   Primary osteoarthritis of right knee [M17.11]  Chronic pain of right knee [M25.561, G89.29]    Date of Injury/Surgery: R TKA 24  Treatment Diagnosis:   M62.81 muscle weakness, R26.81 abnormality of gait.   Insurance/Certification information:  [facility]  Referring Physician:  Yan Martínez MD     PCP: Germán Rodrigues PA  Next Doctor Visit:  3/19/24  Plan of care signed (Y/N):  updated POC sent  Outcome Measure: LEFs:  = 38.75% ability = 61.25% disability   Visit# / total visits:   7/BOMN  Pain level: 0/10   Goals:     Patient goals:  \"to return to normal activities\"    Updated Goals to be achieved by 2024 :  1) Pt will demonstrate I with current HEP as prescribed in order to improve ROM and strength.   2) Pt will demonstrate AROM R knee flexion to at least 120 degrees in order to improve ROM.  3) Pt will demonstrate R LE strength to at least 4+/5 in order to improve strength.   4) Pt will demonstrate the ability to safely complete at least 350 feet in the 2 minute walk test with no AD in order to improve functional mobility.   5) Pt will demonstrate the ability to safely ambulate up and down the steps with a reciprocal gait and no more than 1 HHA in 2/2 trials in order to improve functional mobility.     Subjective:   Pt reports some mild discomfort probably d/t weather and increasing her daily routine.     Any changes in Ambulatory Summary Sheet?  None    Objective:    AROM  LE  Knee:    Right   Knee flexion       120 degrees AAROM        115 degrees AROM  Supine position   Knee extension     0 degrees     Exercises: (No more than 4 columns)

## 2024-04-09 ENCOUNTER — HOSPITAL ENCOUNTER (OUTPATIENT)
Dept: PHYSICAL THERAPY | Age: 69
Setting detail: THERAPIES SERIES
Discharge: HOME | End: 2024-04-09
Attending: ORTHOPAEDIC SURGERY
Payer: COMMERCIAL

## 2024-04-09 PROCEDURE — 97530 THERAPEUTIC ACTIVITIES: CPT

## 2024-04-09 PROCEDURE — 97110 THERAPEUTIC EXERCISES: CPT

## 2024-04-09 NOTE — FLOWSHEET NOTE
Outpatient Physical Therapy  [place]           [] Phone: [phone]   Fax: [phone]  [place]           [x] Phone: [phone]   Fax: [phone]        Physical Therapy Daily Treatment Note  Date:  2024    Patient Name:  Lashawn Duque    :  1955  MRN: [id number]  Restrictions/Precautions: No data recorded   Diagnosis:   Primary osteoarthritis of right knee [M17.11]  Chronic pain of right knee [M25.561, G89.29]    Date of Injury/Surgery: R TKA 24  Treatment Diagnosis:   M62.81 muscle weakness, R26.81 abnormality of gait.   Insurance/Certification information:  [facility]  Referring Physician:  Yan Martínez MD     PCP: Germán Rodrigues PA  Next Doctor Visit:  3/19/24  Plan of care signed (Y/N):  updated POC sent  Outcome Measure: LEFs:  = 38.75% ability = 61.25% disability   Visit# / total visits:   9/BOMN  Pain level: 0/10   Goals:     Patient goals:  \"to return to normal activities\"    Updated Goals to be achieved by 2024 :  1) Pt will demonstrate I with current HEP as prescribed in order to improve ROM and strength.   2) Pt will demonstrate AROM R knee flexion to at least 120 degrees in order to improve ROM.  3) Pt will demonstrate R LE strength to at least 4+/5 in order to improve strength.   4) Pt will demonstrate the ability to safely complete at least 350 feet in the 2 minute walk test with no AD in order to improve functional mobility.   5) Pt will demonstrate the ability to safely ambulate up and down the steps with a reciprocal gait and no more than 1 HHA in 2/2 trials in order to improve functional mobility.     Subjective:   Pt reports some mild discomfort probably d/t weather and increasing her daily routine.     Any changes in Ambulatory Summary Sheet?  None    Objective:    AROM  LE  Knee:    Right   Knee flexion       124 degrees AAROM       Supine position   Knee extension     0 degrees     Exercises: (No more than 4 columns)   Exercise/Equipment Date:

## 2024-04-12 ENCOUNTER — HOSPITAL ENCOUNTER (OUTPATIENT)
Dept: PHYSICAL THERAPY | Age: 69
Setting detail: THERAPIES SERIES
Discharge: HOME | End: 2024-04-12
Attending: ORTHOPAEDIC SURGERY
Payer: COMMERCIAL

## 2024-04-12 PROCEDURE — 97110 THERAPEUTIC EXERCISES: CPT

## 2024-04-12 NOTE — FLOWSHEET NOTE
Outpatient Physical Therapy  [place]           [] Phone: [phone]   Fax: [phone]  [place]           [x] Phone: [phone]   Fax: [phone]        Physical Therapy Daily Treatment Note  Date:  2024    Patient Name:  Lashawn Duque    :  1955  MRN: [id number]  Restrictions/Precautions: No data recorded   Diagnosis:   Primary osteoarthritis of right knee [M17.11]  Chronic pain of right knee [M25.561, G89.29]    Date of Injury/Surgery: R TKA 24  Treatment Diagnosis:   M62.81 muscle weakness, R26.81 abnormality of gait.   Insurance/Certification information:  [facility]  Referring Physician:  Yan Martínez MD     PCP: Germán Rodrigues PA  Next Doctor Visit:  3/19/24  Plan of care signed (Y/N):  updated POC sent  Outcome Measure: LEFs:  = 38.75% ability = 61.25% disability   Visit# / total visits:   10/BOMN  Pain level: 1-2/10   Goals:     Patient goals:  \"to return to normal activities\"    Updated Goals to be achieved by 2024 :  1) Pt will demonstrate I with current HEP as prescribed in order to improve ROM and strength.   2) Pt will demonstrate AROM R knee flexion to at least 120 degrees in order to improve ROM.  3) Pt will demonstrate R LE strength to at least 4+/5 in order to improve strength.   4) Pt will demonstrate the ability to safely complete at least 350 feet in the 2 minute walk test with no AD in order to improve functional mobility.   5) Pt will demonstrate the ability to safely ambulate up and down the steps with a reciprocal gait and no more than 1 HHA in 2/2 trials in order to improve functional mobility.     Subjective:   Patient rates her knee pain 1-2/10 today, but ran out of Tylenol yesterday and has not been anywhere to get more.      Any changes in Ambulatory Summary Sheet?  None    Objective:    AROM  LE  Knee:    Right   Knee flexion             119 degrees AROM  Supine position   Knee extension     0 degrees     Exercises: (No more than 4

## 2024-04-15 ENCOUNTER — HOSPITAL ENCOUNTER (OUTPATIENT)
Dept: PHYSICAL THERAPY | Age: 69
Setting detail: THERAPIES SERIES
Discharge: HOME | End: 2024-04-15
Attending: ORTHOPAEDIC SURGERY
Payer: COMMERCIAL

## 2024-04-15 PROCEDURE — 97110 THERAPEUTIC EXERCISES: CPT

## 2024-04-15 PROCEDURE — 97530 THERAPEUTIC ACTIVITIES: CPT

## 2024-04-15 NOTE — FLOWSHEET NOTE
Outpatient Physical Therapy  [place]           [] Phone: [phone]   Fax: [phone]  [place]           [x] Phone: [phone]   Fax: [phone]        Physical Therapy Daily Treatment Note  Date:  4/15/2024    Patient Name:  Lashawn Duque    :  1955  MRN: [id number]  Restrictions/Precautions: No data recorded   Diagnosis:   Primary osteoarthritis of right knee [M17.11]  Chronic pain of right knee [M25.561, G89.29]    Date of Injury/Surgery: R TKA 24  Treatment Diagnosis:   M62.81 muscle weakness, R26.81 abnormality of gait.   Insurance/Certification information:  [facility]  Referring Physician:  Yan Martínez MD     PCP: Germán Rodrigues PA  Next Doctor Visit:  3/19/24  Plan of care signed (Y/N):  updated POC sent  Outcome Measure: LEFs:  = 38.75% ability = 61.25% disability   Visit# / total visits:   11/BOMN  Pain level: 0/10   Goals:     Patient goals:  \"to return to normal activities\"    Updated Goals to be achieved by 2024 :  1) Pt will demonstrate I with current HEP as prescribed in order to improve ROM and strength.   2) Pt will demonstrate AROM R knee flexion to at least 120 degrees in order to improve ROM. - Flex 125*  AROM  3) Pt will demonstrate R LE strength to at least 4+/5 in order to improve strength. - hip flex 5/5  knee flex 5/5 knee ext 5/5  Ankle DF 5/5  4) Pt will demonstrate the ability to safely complete at least 350 feet in the 2 minute walk test with no AD in order to improve functional mobility. - 436ft  5) Pt will demonstrate the ability to safely ambulate up and down the steps with a reciprocal gait and no more than 1 HHA in 2/2 trials in order to improve functional mobility. - MET    Subjective:   Patient denies pain upon arrival and describes it as stiffness and sometimes she forgets that she's had knee surgery    Any changes in Ambulatory Summary Sheet?  None    Objective:    AROM  LE  Knee:    Right   Knee flexion             125 degrees

## 2024-04-23 ENCOUNTER — TELEPHONE (OUTPATIENT)
Dept: FAMILY MEDICINE CLINIC | Age: 69
End: 2024-04-23

## 2024-04-23 ENCOUNTER — OFFICE VISIT (OUTPATIENT)
Dept: ORTHOPEDIC SURGERY | Age: 69
End: 2024-04-23

## 2024-04-23 VITALS
WEIGHT: 230 LBS | BODY MASS INDEX: 39.27 KG/M2 | HEIGHT: 64 IN | HEART RATE: 67 BPM | RESPIRATION RATE: 14 BRPM | OXYGEN SATURATION: 97 %

## 2024-04-23 DIAGNOSIS — Z96.651 S/P TOTAL KNEE ARTHROPLASTY, RIGHT: Primary | ICD-10-CM

## 2024-04-23 PROCEDURE — 99024 POSTOP FOLLOW-UP VISIT: CPT

## 2024-04-23 NOTE — PATIENT INSTRUCTIONS
Continue weight-bearing as tolerated.  Continue range of motion exercises as instructed.  Ice and elevate as needed.  Tylenol or Motrin for pain  Follow up as needed.    We are committed to providing you the best care possible.  If you receive a survey after visiting one of our offices, please take time to share your experience concerning your physician office visit.  These surveys are confidential and no health information about you is shared.  We are eager to improve for you and we are counting on your feedback to help make that happen.

## 2024-05-06 NOTE — PROGRESS NOTES
4/23/2024   Chief Complaint   Patient presents with    Knee Pain     Right TKA DOS 01/29/2024        History of Present Illness:      Today's HPI:  Patient is a 68-year-old female returning to the office today for continued management of her total right knee replacement, DOS 1/29/2024.  She states she is doing very well with her recovery and has no major concerns at today's visit.  She is finishing up physical therapy and states she can reach near full range of motion.    Previous HPI:                         Lashawn Duque is a 68 y.o. female who returns today for follow-up of her right total knee replacement.  She is doing very well with her recovery.  He is making good progress with therapy.    Patient seen in office today for: PO 6wk Right TKA, DOS 1/29/2024     Patient reports 3/10 pain. Patient is having minimum pain and ROM is doing well.       Patient continues physical therapy        Medical History  Patient's medications, allergies, past medical, surgical, social and family histories were reviewed and updated as appropriate.      Review of Systems                                            Examination:  General Exam:  Vitals: Pulse 67   Resp 14   Ht 1.626 m (5' 4\")   Wt 104.3 kg (230 lb)   LMP  (LMP Unknown)   SpO2 97%   BMI 39.48 kg/m²    Physical Exam       Right Lower Extremity:    The incision is well-healed.  No erythema, drainage, or induration.  Minimal resolving soft tissue swelling present throughout the soft tissues of the knee.  Range of motion is present from full extension to 120 degrees of flexion.  Calf is soft and nontender.  Negative Homans sign.  Sensation and motor function is intact at the knee and ankle.      Diagnostic testing:  X-rays reviewed in office, I independently reviewed the films in the office today:     Previous x-rays show well aligned total knee replacement with no complications    Office Procedures:  No orders of the defined types were placed in this

## 2024-05-09 ENCOUNTER — OFFICE VISIT (OUTPATIENT)
Dept: FAMILY MEDICINE CLINIC | Age: 69
End: 2024-05-09

## 2024-05-09 VITALS
OXYGEN SATURATION: 97 % | WEIGHT: 239 LBS | HEIGHT: 64 IN | HEART RATE: 60 BPM | DIASTOLIC BLOOD PRESSURE: 72 MMHG | SYSTOLIC BLOOD PRESSURE: 120 MMHG | RESPIRATION RATE: 18 BRPM | BODY MASS INDEX: 40.8 KG/M2

## 2024-05-09 DIAGNOSIS — R60.1 GENERALIZED EDEMA: ICD-10-CM

## 2024-05-09 DIAGNOSIS — R34 OLIGOURIA: ICD-10-CM

## 2024-05-09 DIAGNOSIS — K59.00 CONSTIPATION, UNSPECIFIED CONSTIPATION TYPE: ICD-10-CM

## 2024-05-09 DIAGNOSIS — E03.9 ACQUIRED HYPOTHYROIDISM: ICD-10-CM

## 2024-05-09 DIAGNOSIS — R60.1 GENERALIZED EDEMA: Primary | ICD-10-CM

## 2024-05-09 LAB
DEPRECATED RDW RBC AUTO: 13.9 % (ref 12.4–15.4)
HCT VFR BLD AUTO: 39 % (ref 36–48)
HGB BLD-MCNC: 13.7 G/DL (ref 12–16)
MCH RBC QN AUTO: 30.5 PG (ref 26–34)
MCHC RBC AUTO-ENTMCNC: 35 G/DL (ref 31–36)
MCV RBC AUTO: 87 FL (ref 80–100)
PLATELET # BLD AUTO: 218 K/UL (ref 135–450)
PMV BLD AUTO: 10.7 FL (ref 5–10.5)
RBC # BLD AUTO: 4.49 M/UL (ref 4–5.2)
WBC # BLD AUTO: 7 K/UL (ref 4–11)

## 2024-05-09 RX ORDER — LEVOTHYROXINE SODIUM 112 UG/1
TABLET ORAL
Qty: 90 TABLET | Refills: 3 | Status: SHIPPED | OUTPATIENT
Start: 2024-05-09

## 2024-05-09 ASSESSMENT — PATIENT HEALTH QUESTIONNAIRE - PHQ9
SUM OF ALL RESPONSES TO PHQ QUESTIONS 1-9: 0
1. LITTLE INTEREST OR PLEASURE IN DOING THINGS: NOT AT ALL
SUM OF ALL RESPONSES TO PHQ QUESTIONS 1-9: 0
SUM OF ALL RESPONSES TO PHQ9 QUESTIONS 1 & 2: 0
2. FEELING DOWN, DEPRESSED OR HOPELESS: NOT AT ALL

## 2024-05-09 NOTE — PROGRESS NOTES
5/9/2024    Lashawn Duque    Chief Complaint   Patient presents with    Edema     C/o bilateral leg swelling- C/o #9lb weight gain in the last month or so    OTHER     Reports that she has had a few episodes of Afib-lasted 45 minutes.       HPI  History was obtained from pt.  Lashawn is a 68 y.o. female with a PMHx as listed below who presents today for bilateral leg swelling and 9 lb weight gain.    Has been feeling like she is retaining fluid for the past month - in her fingers, stomach, ankles, feels sluggish and is only getting up once a night to pee where her usual is 3-4 times.  Has also been urinating less during the day.  She feels uncomfortable and sluggish but no SOB, chest pain or dark urine. Seems to have gained 9 pounds in the last 6 weeks. Weight gain has been steady.    Is 3-4 months sp right knee replacement    Thinks she is having runs of Afib the last two Fridays - lasting 30-60 minutes and resolved spontaneously.      Has been more constipated than before    Had hypokalmeia in january      1. Generalized edema    2. Acquired hypothyroidism    3. Oligouria    4. Constipation, unspecified constipation type             REVIEW OF SYMPTOMS    Review of Systems    PAST MEDICAL HISTORY  Past Medical History:   Diagnosis Date    A-fib ([facility]) 07/2023    Acute kidney failure, unspecified ([facility]) 08/30/2021    Diabetes mellitus ([facility])     Difficult intubation     H/O 24 hour EKG monitoring 11/06/2019    Basic sinus rhythm Minimum HR 41 bpm APC noted brief SVT noted Pt reported symptoms of lightheadedness no arrhythmias noted.     H/O echocardiogram 11/29/2019    EF 55-60%, Grade II DD, Mild MR, TR & PHTN.    History of nuclear stress test 11/25/2019    normal study.    Hypertension     Hypopotassemia 07/12/2022    NLESON on CPAP     Thyroid disease        FAMILY HISTORY  Family History   Problem Relation Age of Onset    Arthritis Mother     Diabetes Mother     Hypertension Mother     Diabetes Father

## 2024-05-09 NOTE — PATIENT INSTRUCTIONS
Welcome to [facility]:    Did you know we now have a faster way for you to move through your appointment? For your convenience, we now have digital registration available. When you schedule your next appointment, you will receive a link via your email as well as a text message that will allow you to complete any paperwork digitally before your appointment. We are committed to providing you the best care possible.    If you receive a survey after visiting one of our offices, please take time to share your experience concerning your physician office visit.  These surveys are confidential and no health information about you is shared.    We are eager to improve for you and continue to give you satisfactory care, we are counting on your feedback to help make that happen.

## 2024-05-10 LAB
ALBUMIN SERPL-MCNC: 4.4 G/DL (ref 3.4–5)
ALBUMIN/GLOB SERPL: 1.8 {RATIO} (ref 1.1–2.2)
ALP SERPL-CCNC: 82 U/L (ref 40–129)
ALT SERPL-CCNC: 11 U/L (ref 10–40)
ANION GAP SERPL CALCULATED.3IONS-SCNC: 16 MMOL/L (ref 3–16)
AST SERPL-CCNC: 14 U/L (ref 15–37)
BACTERIA URNS QL MICRO: ABNORMAL /HPF
BILIRUB SERPL-MCNC: 0.5 MG/DL (ref 0–1)
BILIRUB UR QL STRIP.AUTO: NEGATIVE
BUN SERPL-MCNC: 17 MG/DL (ref 7–20)
CALCIUM SERPL-MCNC: 9.9 MG/DL (ref 8.3–10.6)
CHLORIDE SERPL-SCNC: 98 MMOL/L (ref 99–110)
CLARITY UR: CLEAR
CO2 SERPL-SCNC: 25 MMOL/L (ref 21–32)
COLOR UR: YELLOW
CREAT SERPL-MCNC: 1 MG/DL (ref 0.6–1.2)
EPI CELLS #/AREA URNS AUTO: 4 /HPF (ref 0–5)
GFR SERPLBLD CREATININE-BSD FMLA CKD-EPI: 61 ML/MIN/{1.73_M2}
GLUCOSE SERPL-MCNC: 93 MG/DL (ref 70–99)
GLUCOSE UR STRIP.AUTO-MCNC: NEGATIVE MG/DL
HGB UR QL STRIP.AUTO: NEGATIVE
HYALINE CASTS #/AREA URNS AUTO: 1 /LPF (ref 0–8)
KETONES UR STRIP.AUTO-MCNC: NEGATIVE MG/DL
LEUKOCYTE ESTERASE UR QL STRIP.AUTO: ABNORMAL
NITRITE UR QL STRIP.AUTO: NEGATIVE
PH UR STRIP.AUTO: 7 [PH] (ref 5–8)
POTASSIUM SERPL-SCNC: 3.6 MMOL/L (ref 3.5–5.1)
PROT SERPL-MCNC: 6.9 G/DL (ref 6.4–8.2)
PROT UR STRIP.AUTO-MCNC: NEGATIVE MG/DL
RBC CLUMPS #/AREA URNS AUTO: 0 /HPF (ref 0–4)
SODIUM SERPL-SCNC: 139 MMOL/L (ref 136–145)
SP GR UR STRIP.AUTO: 1.01 (ref 1–1.03)
TSH SERPL DL<=0.005 MIU/L-ACNC: 1.46 UIU/ML (ref 0.27–4.2)
UA DIPSTICK W REFLEX MICRO PNL UR: YES
URN SPEC COLLECT METH UR: ABNORMAL
UROBILINOGEN UR STRIP-ACNC: 0.2 E.U./DL
WBC #/AREA URNS AUTO: 1 /HPF (ref 0–5)

## 2024-05-14 DIAGNOSIS — R60.1 GENERALIZED EDEMA: Primary | ICD-10-CM

## 2024-05-14 DIAGNOSIS — I10 ESSENTIAL HYPERTENSION: ICD-10-CM

## 2024-05-14 RX ORDER — HYDROCHLOROTHIAZIDE 12.5 MG/1
12.5 CAPSULE, GELATIN COATED ORAL EVERY MORNING
Qty: 30 CAPSULE | Refills: 2 | Status: SHIPPED | OUTPATIENT
Start: 2024-05-14

## 2024-05-24 NOTE — TELEPHONE ENCOUNTER
Pharmacist called requesting clarification for Rx diltiazem 30 mg, prn. She stated more specifics were needed for insurance purposes. After talking to doctor, he prescribed Diltiazem 30 mg, every 6 hours, prn. Pharmacist voiced understanding. 24-May-2024 17:16

## 2024-07-09 DIAGNOSIS — I10 ESSENTIAL HYPERTENSION: ICD-10-CM

## 2024-07-09 RX ORDER — INDAPAMIDE 2.5 MG/1
TABLET ORAL
Qty: 90 TABLET | Refills: 1 | Status: SHIPPED | OUTPATIENT
Start: 2024-07-09

## 2024-07-30 ENCOUNTER — TELEPHONE (OUTPATIENT)
Dept: ORTHOPEDIC SURGERY | Age: 69
End: 2024-07-30

## 2024-07-30 ENCOUNTER — TELEPHONE (OUTPATIENT)
Dept: FAMILY MEDICINE CLINIC | Age: 69
End: 2024-07-30

## 2024-07-30 NOTE — TELEPHONE ENCOUNTER
Krystal from [facility] called asking if patient needs pre-meds before any dental procedure. Per Dr. Martínez patient doesn't require pre-med antibiotic.

## 2024-08-06 ENCOUNTER — OFFICE VISIT (OUTPATIENT)
Dept: FAMILY MEDICINE CLINIC | Age: 69
End: 2024-08-06
Payer: COMMERCIAL

## 2024-08-06 VITALS
WEIGHT: 239.6 LBS | HEART RATE: 76 BPM | DIASTOLIC BLOOD PRESSURE: 90 MMHG | RESPIRATION RATE: 18 BRPM | SYSTOLIC BLOOD PRESSURE: 146 MMHG | HEIGHT: 64 IN | OXYGEN SATURATION: 97 % | BODY MASS INDEX: 40.9 KG/M2

## 2024-08-06 DIAGNOSIS — R60.1 GENERALIZED EDEMA: ICD-10-CM

## 2024-08-06 DIAGNOSIS — I10 ESSENTIAL HYPERTENSION: ICD-10-CM

## 2024-08-06 DIAGNOSIS — Z00.00 MEDICARE ANNUAL WELLNESS VISIT, SUBSEQUENT: Primary | ICD-10-CM

## 2024-08-06 PROCEDURE — 3077F SYST BP >= 140 MM HG: CPT | Performed by: PHYSICIAN ASSISTANT

## 2024-08-06 PROCEDURE — 3080F DIAST BP >= 90 MM HG: CPT | Performed by: PHYSICIAN ASSISTANT

## 2024-08-06 PROCEDURE — G0439 PPPS, SUBSEQ VISIT: HCPCS | Performed by: PHYSICIAN ASSISTANT

## 2024-08-06 PROCEDURE — 99214 OFFICE O/P EST MOD 30 MIN: CPT | Performed by: PHYSICIAN ASSISTANT

## 2024-08-06 PROCEDURE — 1123F ACP DISCUSS/DSCN MKR DOCD: CPT | Performed by: PHYSICIAN ASSISTANT

## 2024-08-06 RX ORDER — AMLODIPINE BESYLATE 5 MG/1
5 TABLET ORAL DAILY
Qty: 90 TABLET | Refills: 0 | Status: SHIPPED | OUTPATIENT
Start: 2024-08-06

## 2024-08-06 ASSESSMENT — PATIENT HEALTH QUESTIONNAIRE - PHQ9
SUM OF ALL RESPONSES TO PHQ QUESTIONS 1-9: 0
1. LITTLE INTEREST OR PLEASURE IN DOING THINGS: NOT AT ALL
SUM OF ALL RESPONSES TO PHQ9 QUESTIONS 1 & 2: 0
SUM OF ALL RESPONSES TO PHQ QUESTIONS 1-9: 0
2. FEELING DOWN, DEPRESSED OR HOPELESS: NOT AT ALL

## 2024-08-06 NOTE — PROGRESS NOTES
Cervical back: Normal range of motion and neck supple. No rigidity. No muscular tenderness.      Right lower leg: No edema.      Left lower leg: No edema.   Skin:     General: Skin is warm and dry.      Capillary Refill: Capillary refill takes less than 2 seconds.      Findings: No bruising, erythema or rash.   Neurological:      General: No focal deficit present.      Mental Status: She is alert and oriented to person, place, and time.      Coordination: Coordination normal.      Gait: Gait normal.   Psychiatric:         Mood and Affect: Mood normal.         Behavior: Behavior normal.      GUAG normal, clock normal           Allergies   Allergen Reactions    Seasonal      Prior to Visit Medications    Medication Sig Taking? Authorizing Provider   rivaroxaban (XARELTO) 20 MG TABS tablet Take 1 tablet by mouth daily (with breakfast) Yes Comfort Parmar APRN - CNP   indapamide (LOZOL) 2.5 MG tablet TAKE 1 TABLET BY MOUTH IN THE MORNING Yes Pedro Howell APRN - CNP   hydroCHLOROthiazide 12.5 MG capsule Take 1 capsule by mouth every morning Yes Germán Rodrigues PA   levothyroxine (SYNTHROID) 112 MCG tablet Take 1 tablet by mouth once daily Yes Germán Rodrigues PA   sotalol (BETAPACE) 80 MG tablet Take 1 tablet by mouth daily Yes Comfort Parmar APRN - CNP   docusate sodium (COLACE) 100 MG capsule Take 1 capsule by mouth daily as needed for Constipation  Patient not taking: Reported on 8/6/2024  Yan Martínez MD   potassium chloride (KLOR-CON M) 20 MEQ extended release tablet Take 1 tablet by mouth daily for 7 days  Patient not taking: Reported on 2/15/2024  Miri Teague DO CareTeam (Including outside providers/suppliers regularly involved in providing care):   Patient Care Team:  Germán Rodrigues PA as PCP - General (Family Medicine)  Germán Rodrigues PA as PCP - Empaneled Provider  Erasmo Guillory MD as Consulting Physician (Pulmonology)  Minnie Gabriel MD as Consulting Physician

## 2024-08-06 NOTE — PATIENT INSTRUCTIONS
& Physical Activity: Your Everyday Guide\" from [employer]. Call 1-425.841.1633 or search [employer] online.  You need 8348-9010 mg of calcium and 2829-5941 IU of vitamin D per day. It is possible to meet your calcium requirement with diet alone, but a vitamin D supplement is usually necessary to meet this goal.  When exposed to the sun, use a sunscreen that protects against both UVA and UVB radiation with an SPF of 30 or greater. Reapply every 2 to 3 hours or after sweating, drying off with a towel, or swimming.  Always wear a seat belt when traveling in a car. Always wear a helmet when riding a bicycle or motorcycle.

## 2024-08-06 NOTE — PROGRESS NOTES
morning 30 capsule 2    levothyroxine (SYNTHROID) 112 MCG tablet Take 1 tablet by mouth once daily 90 tablet 3    sotalol (BETAPACE) 80 MG tablet Take 1 tablet by mouth daily 90 tablet 1    docusate sodium (COLACE) 100 MG capsule Take 1 capsule by mouth daily as needed for Constipation (Patient not taking: Reported on 8/6/2024) 30 capsule 0    potassium chloride (KLOR-CON M) 20 MEQ extended release tablet Take 1 tablet by mouth daily for 7 days (Patient not taking: Reported on 2/15/2024) 7 tablet 0     No current facility-administered medications for this visit.       ALLERGIES  Allergies   Allergen Reactions    Seasonal        PHYSICAL EXAM    BP (!) 146/90 (Site: Left Upper Arm, Position: Sitting, Cuff Size: Large Adult)   Pulse 76   Resp 18   Ht 1.626 m (5' 4\")   Wt 108.7 kg (239 lb 9.6 oz)   LMP  (LMP Unknown)   SpO2 97%   BMI 41.13 kg/m²     Physical Exam  Constitutional:       Appearance: Normal appearance. She is obese.   HENT:      Head: Normocephalic and atraumatic.      Right Ear: Tympanic membrane and external ear normal.      Left Ear: Tympanic membrane and external ear normal.      Nose: No rhinorrhea.      Mouth/Throat:      Mouth: Mucous membranes are moist.      Pharynx: Oropharynx is clear. No oropharyngeal exudate or posterior oropharyngeal erythema.   Eyes:      General: No scleral icterus.     Extraocular Movements: Extraocular movements intact.      Conjunctiva/sclera: Conjunctivae normal.      Pupils: Pupils are equal, round, and reactive to light.   Cardiovascular:      Rate and Rhythm: Normal rate and regular rhythm.      Pulses: Normal pulses.      Heart sounds: Normal heart sounds. No murmur heard.     No friction rub. No gallop.   Pulmonary:      Effort: Pulmonary effort is normal.      Breath sounds: Normal breath sounds. No wheezing, rhonchi or rales.   Abdominal:      General: Bowel sounds are normal. There is no distension.      Palpations: Abdomen is soft. There is no mass.

## 2024-08-19 ENCOUNTER — HOSPITAL ENCOUNTER (OUTPATIENT)
Age: 69
Discharge: HOME | End: 2024-08-19
Payer: COMMERCIAL

## 2024-08-19 DIAGNOSIS — N17.9 AKI (ACUTE KIDNEY INJURY) (HCC): ICD-10-CM

## 2024-08-19 DIAGNOSIS — I10 ESSENTIAL HYPERTENSION: ICD-10-CM

## 2024-08-19 LAB
ANION GAP SERPL CALCULATED.3IONS-SCNC: 12 MMOL/L (ref 7–16)
BUN SERPL-MCNC: 22 MG/DL (ref 6–23)
CALCIUM SERPL-MCNC: 9.5 MG/DL (ref 8.3–10.6)
CHLORIDE BLD-SCNC: 97 MMOL/L (ref 99–110)
CO2: 26 MMOL/L (ref 21–32)
CREAT SERPL-MCNC: 1 MG/DL (ref 0.6–1.1)
GFR, ESTIMATED: 61 ML/MIN/1.73M2
GLUCOSE SERPL-MCNC: 105 MG/DL (ref 70–99)
POTASSIUM SERPL-SCNC: 3.5 MMOL/L (ref 3.5–5.1)
SODIUM BLD-SCNC: 135 MMOL/L (ref 135–145)

## 2024-08-19 PROCEDURE — 80048 BASIC METABOLIC PNL TOTAL CA: CPT

## 2024-08-19 PROCEDURE — 36415 COLL VENOUS BLD VENIPUNCTURE: CPT

## 2024-09-07 ENCOUNTER — HOSPITAL ENCOUNTER (OUTPATIENT)
Age: 69
Discharge: HOME | End: 2024-09-07
Payer: COMMERCIAL

## 2024-09-07 DIAGNOSIS — I10 ESSENTIAL HYPERTENSION: ICD-10-CM

## 2024-09-07 DIAGNOSIS — N18.1 CHRONIC KIDNEY DISEASE, STAGE I: ICD-10-CM

## 2024-09-07 DIAGNOSIS — E87.6 HYPOPOTASSEMIA: ICD-10-CM

## 2024-09-07 PROCEDURE — 83735 ASSAY OF MAGNESIUM: CPT

## 2024-09-07 PROCEDURE — 36415 COLL VENOUS BLD VENIPUNCTURE: CPT

## 2024-09-07 PROCEDURE — 80048 BASIC METABOLIC PNL TOTAL CA: CPT

## 2024-09-20 ENCOUNTER — OFFICE VISIT (OUTPATIENT)
Dept: CARDIOLOGY CLINIC | Age: 69
End: 2024-09-20
Payer: COMMERCIAL

## 2024-09-20 VITALS
HEIGHT: 64 IN | WEIGHT: 236.4 LBS | SYSTOLIC BLOOD PRESSURE: 134 MMHG | HEART RATE: 62 BPM | DIASTOLIC BLOOD PRESSURE: 78 MMHG | BODY MASS INDEX: 40.36 KG/M2

## 2024-09-20 DIAGNOSIS — Z79.899 ENCOUNTER FOR MONITORING SOTALOL THERAPY: ICD-10-CM

## 2024-09-20 DIAGNOSIS — I48.0 PAF (PAROXYSMAL ATRIAL FIBRILLATION) (HCC): Primary | ICD-10-CM

## 2024-09-20 DIAGNOSIS — D68.69 HYPERCOAGULABLE STATE DUE TO PAROXYSMAL ATRIAL FIBRILLATION (HCC): ICD-10-CM

## 2024-09-20 DIAGNOSIS — I10 ESSENTIAL HYPERTENSION: ICD-10-CM

## 2024-09-20 DIAGNOSIS — I48.0 HYPERCOAGULABLE STATE DUE TO PAROXYSMAL ATRIAL FIBRILLATION (HCC): ICD-10-CM

## 2024-09-20 DIAGNOSIS — K30 INDIGESTION: ICD-10-CM

## 2024-09-20 DIAGNOSIS — Z51.81 ENCOUNTER FOR MONITORING SOTALOL THERAPY: ICD-10-CM

## 2024-09-20 PROCEDURE — 1123F ACP DISCUSS/DSCN MKR DOCD: CPT | Performed by: NURSE PRACTITIONER

## 2024-09-20 PROCEDURE — 93000 ELECTROCARDIOGRAM COMPLETE: CPT | Performed by: NURSE PRACTITIONER

## 2024-09-20 PROCEDURE — 3075F SYST BP GE 130 - 139MM HG: CPT | Performed by: NURSE PRACTITIONER

## 2024-09-20 PROCEDURE — 99214 OFFICE O/P EST MOD 30 MIN: CPT | Performed by: NURSE PRACTITIONER

## 2024-09-20 PROCEDURE — 3078F DIAST BP <80 MM HG: CPT | Performed by: NURSE PRACTITIONER

## 2024-09-20 RX ORDER — SOTALOL HYDROCHLORIDE 80 MG/1
80 TABLET ORAL DAILY
Qty: 90 TABLET | Refills: 1 | Status: SHIPPED | OUTPATIENT
Start: 2024-09-20 | End: 2025-03-19

## 2024-09-20 ASSESSMENT — ENCOUNTER SYMPTOMS
ABDOMINAL PAIN: 0
COUGH: 0
SINUS PRESSURE: 0
BLOOD IN STOOL: 0
ABDOMINAL DISTENTION: 0
SHORTNESS OF BREATH: 1
PHOTOPHOBIA: 0
SINUS PAIN: 0
COLOR CHANGE: 0

## 2024-09-30 ENCOUNTER — TELEPHONE (OUTPATIENT)
Dept: GASTROENTEROLOGY | Age: 69
End: 2024-09-30

## 2024-09-30 NOTE — TELEPHONE ENCOUNTER
Called pt to discuss referral for indigestion. Pt does not have a VM box set up yet, no contact made.

## 2024-10-07 ENCOUNTER — TELEPHONE (OUTPATIENT)
Dept: GASTROENTEROLOGY | Age: 69
End: 2024-10-07

## 2024-10-10 LAB
ANION GAP SERPL CALCULATED.3IONS-SCNC: 12 MMOL/L (ref 4–16)
BUN SERPL-MCNC: 20 MG/DL (ref 6–23)
CALCIUM SERPL-MCNC: 9.3 MG/DL (ref 8.3–10.6)
CHLORIDE SERPL-SCNC: 96 MMOL/L (ref 99–110)
CO2 SERPL-SCNC: 25 MMOL/L (ref 21–32)
CREAT SERPL-MCNC: 0.9 MG/DL (ref 0.6–1.1)
GFR, ESTIMATED: 69 ML/MIN/1.73M2
GLUCOSE SERPL-MCNC: 108 MG/DL (ref 70–99)
MAGNESIUM SERPL-MCNC: 2.1 MG/DL (ref 1.8–2.4)
POTASSIUM SERPL-SCNC: 3.5 MMOL/L (ref 3.5–5.1)
SODIUM SERPL-SCNC: 133 MMOL/L (ref 135–145)

## 2024-10-15 ENCOUNTER — TELEPHONE (OUTPATIENT)
Dept: GASTROENTEROLOGY | Age: 69
End: 2024-10-15

## 2024-10-29 DIAGNOSIS — I10 ESSENTIAL HYPERTENSION: ICD-10-CM

## 2024-10-29 RX ORDER — AMLODIPINE BESYLATE 5 MG/1
5 TABLET ORAL DAILY
Qty: 90 TABLET | Refills: 0 | Status: SHIPPED | OUTPATIENT
Start: 2024-10-29

## 2024-11-04 ENCOUNTER — TELEPHONE (OUTPATIENT)
Dept: CARDIOLOGY CLINIC | Age: 69
End: 2024-11-04

## 2024-11-12 ENCOUNTER — HOSPITAL ENCOUNTER (OUTPATIENT)
Age: 69
Discharge: HOME | End: 2024-11-12
Payer: COMMERCIAL

## 2024-11-12 DIAGNOSIS — N17.9 AKI (ACUTE KIDNEY INJURY) (HCC): ICD-10-CM

## 2024-11-12 DIAGNOSIS — N18.1 CHRONIC KIDNEY DISEASE, STAGE I: ICD-10-CM

## 2024-11-12 DIAGNOSIS — E87.6 HYPOPOTASSEMIA: ICD-10-CM

## 2024-11-12 DIAGNOSIS — I10 ESSENTIAL HYPERTENSION: ICD-10-CM

## 2024-11-12 LAB
ANION GAP SERPL CALCULATED.3IONS-SCNC: 12 MMOL/L (ref 4–16)
BUN SERPL-MCNC: 16 MG/DL (ref 6–23)
CALCIUM SERPL-MCNC: 9.6 MG/DL (ref 8.3–10.6)
CHLORIDE SERPL-SCNC: 98 MMOL/L (ref 99–110)
CO2 SERPL-SCNC: 27 MMOL/L (ref 21–32)
CREAT SERPL-MCNC: 1 MG/DL (ref 0.6–1.1)
GFR, ESTIMATED: 61 ML/MIN/1.73M2
GLUCOSE SERPL-MCNC: 90 MG/DL (ref 70–99)
MAGNESIUM SERPL-MCNC: 2.1 MG/DL (ref 1.8–2.4)
POTASSIUM SERPL-SCNC: 3.5 MMOL/L (ref 3.5–5.1)
SODIUM SERPL-SCNC: 137 MMOL/L (ref 135–145)

## 2024-11-12 PROCEDURE — 36415 COLL VENOUS BLD VENIPUNCTURE: CPT

## 2024-11-12 PROCEDURE — 83735 ASSAY OF MAGNESIUM: CPT

## 2024-11-12 PROCEDURE — 80048 BASIC METABOLIC PNL TOTAL CA: CPT

## 2024-11-19 ENCOUNTER — PREP FOR PROCEDURE (OUTPATIENT)
Dept: GASTROENTEROLOGY | Age: 69
End: 2024-11-19

## 2024-11-19 ENCOUNTER — OFFICE VISIT (OUTPATIENT)
Dept: GASTROENTEROLOGY | Age: 69
End: 2024-11-19
Payer: COMMERCIAL

## 2024-11-19 VITALS
OXYGEN SATURATION: 97 % | WEIGHT: 231.2 LBS | HEART RATE: 67 BPM | SYSTOLIC BLOOD PRESSURE: 126 MMHG | DIASTOLIC BLOOD PRESSURE: 72 MMHG | RESPIRATION RATE: 16 BRPM | HEIGHT: 64 IN | BODY MASS INDEX: 39.47 KG/M2

## 2024-11-19 DIAGNOSIS — R14.0 BLOATING: ICD-10-CM

## 2024-11-19 DIAGNOSIS — R12 HEARTBURN: ICD-10-CM

## 2024-11-19 DIAGNOSIS — R12 HEARTBURN: Primary | ICD-10-CM

## 2024-11-19 DIAGNOSIS — R13.14 PHARYNGOESOPHAGEAL DYSPHAGIA: ICD-10-CM

## 2024-11-19 DIAGNOSIS — R05.9 COUGH, UNSPECIFIED TYPE: ICD-10-CM

## 2024-11-19 PROCEDURE — 1123F ACP DISCUSS/DSCN MKR DOCD: CPT | Performed by: NURSE PRACTITIONER

## 2024-11-19 PROCEDURE — 3074F SYST BP LT 130 MM HG: CPT | Performed by: NURSE PRACTITIONER

## 2024-11-19 PROCEDURE — 99204 OFFICE O/P NEW MOD 45 MIN: CPT | Performed by: NURSE PRACTITIONER

## 2024-11-19 PROCEDURE — 3078F DIAST BP <80 MM HG: CPT | Performed by: NURSE PRACTITIONER

## 2024-11-19 PROCEDURE — 1159F MED LIST DOCD IN RCRD: CPT | Performed by: NURSE PRACTITIONER

## 2024-11-19 ASSESSMENT — ENCOUNTER SYMPTOMS
WHEEZING: 0
COUGH: 1
BACK PAIN: 1
SHORTNESS OF BREATH: 0
BLOOD IN STOOL: 0
NAUSEA: 0
VOMITING: 0
CONSTIPATION: 0
EYE PAIN: 0
DIARRHEA: 0
PHOTOPHOBIA: 0
COLOR CHANGE: 0
ABDOMINAL PAIN: 0

## 2024-11-19 NOTE — PROGRESS NOTES
Lashawn Duque 69 y.o. female was seen by ENA Dominique on 11/19/2024     Wt Readings from Last 3 Encounters:   11/19/24 104.9 kg (231 lb 3.2 oz)   11/14/24 106.1 kg (234 lb)   09/20/24 107.2 kg (236 lb 6.4 oz)       HPI  Lashawn Duque is a pleasant 69 y.o.  female who presents today for atypical cough, bloating, dysphagia and heartburn.  She has a past medical history of a-fib, acute kidney failure, unspecified, diabetes mellitus, difficult intubation, H/O 24 hour EKG monitoring, H/O echocardiogram, history of nuclear stress test, hypertension, hypopotassemia, NELSON on CPAP, and thyroid disease.  She is on Xarelto.  No current NSAID use.  She has never had EGD.  Her last colonoscopy was done by Dr. Renteria on 12- showing non-bleeding internal hemorrhoids.  Her appetite is good without early satiety.  Her weight is stable.  No nausea or vomiting.  No gluten intolerance.  Dairy worsens bloating.  She has changed in diet in the last month with avoiding spicy or fatty foods.  She does endorse eating late at night.  No abdominal pain.  She has bloating.  Her heartburn has been ongoing for seven years.  Her heartburn symptoms have worsened in the last week with burning sensation in her chest that comes up to her throat.  Nocturnal awakenings with acid reflux a couple nights a week.  She has dysphagia with voice changes in the last year more hoarseness.  Last episode of trouble swallowing a week ago while eating chicken that got caught up in her upper esophagus and had to force down with water.  She has also noticed some difficulty with swallowing her pills.  No pain with swallowing.  She has excess belching and flatulence.  She denies changes in her bowel pattern. Her typical bowel pattern is two to three times in morning with soft mushy brown then once in afternoon.  No diarrhea or constipation.  No active rectal bleeding or melena.  No family history of stomach or colon cancer.     ROS  Review of

## 2024-11-20 RX ORDER — SODIUM CHLORIDE 0.9 % (FLUSH) 0.9 %
5-40 SYRINGE (ML) INJECTION PRN
Status: CANCELLED | OUTPATIENT
Start: 2024-11-20

## 2024-11-20 RX ORDER — SODIUM CHLORIDE 9 MG/ML
INJECTION, SOLUTION INTRAVENOUS PRN
Status: CANCELLED | OUTPATIENT
Start: 2024-11-20

## 2024-11-20 RX ORDER — SODIUM CHLORIDE, SODIUM LACTATE, POTASSIUM CHLORIDE, CALCIUM CHLORIDE 600; 310; 30; 20 MG/100ML; MG/100ML; MG/100ML; MG/100ML
INJECTION, SOLUTION INTRAVENOUS CONTINUOUS
Status: CANCELLED | OUTPATIENT
Start: 2024-11-20

## 2024-11-20 RX ORDER — SODIUM CHLORIDE 0.9 % (FLUSH) 0.9 %
5-40 SYRINGE (ML) INJECTION EVERY 12 HOURS SCHEDULED
Status: CANCELLED | OUTPATIENT
Start: 2024-11-20

## 2024-12-03 NOTE — PROGRESS NOTES
Patient will arrive at 0600 at [facility] on 12/10/2024 for her procedure at 0730. Patient stated that in 2001 she was told she was difficult to intubate and she woke up on a vent. I looked through her records they only go back to 2013. She said she thinks they ruptured something. She stated that \" no one wanted to talk to her about it.\" I will notify Nicky and see what she said my next step should be.ADDENDUM: Spoke with Nicky and I will let anesthesia know about it at tomorrow's meeting.ADDENDUM: Spoke with Rosales BOLANOS to let him know patient's history of difficult intubation and he will look into it.    NOTHING TO EAT OR DRINK AFTER MIDNIGHT DAY OF SURGERY    1. Enter thru the hospital main entrance on day of surgery, check in at the Information Desk. If you arrive prior to 6:00am, enter thru the ER entrance.    2. Follow the directions as prescribed by the doctor for your procedure and medications.         Morning of surgery take: sotalol, synthroid and amlodipine.         Stop vitamins, supplements and NSAIDS:  last dose xarelto 12/7/2024    3. Check with your Doctor regarding stopping blood thinners and follow their instructions.    4. Do not smoke, vape or use chewing tobacco morning of surgery. Do not drink any alcoholic beverages 24 hours prior to surgery.       This includes NA Beer. No street drugs 7 days prior to surgery.    5. If you have dentures, contacts of glasses they will be removed before going to the OR; please bring a case.    6. Please bring picture ID, insurance card, paperwork from the doctor’s office (H & P, Consent, & card for implantable devices).    7. Take a shower with an antibacterial soap the night before surgery and the morning of surgery. Do not put anything on your skin      After your morning shower.    8. You will need a responsible adult to drive you home and check on you after surgery.

## 2024-12-06 ENCOUNTER — ANESTHESIA EVENT (OUTPATIENT)
Dept: ENDOSCOPY | Age: 69
End: 2024-12-06
Payer: COMMERCIAL

## 2024-12-09 ENCOUNTER — HOSPITAL ENCOUNTER (OUTPATIENT)
Age: 69
Setting detail: SPECIMEN
Discharge: HOME | End: 2024-12-09
Payer: COMMERCIAL

## 2024-12-09 PROCEDURE — 88305 TISSUE EXAM BY PATHOLOGIST: CPT | Performed by: PATHOLOGY

## 2024-12-09 PROCEDURE — 88342 IMHCHEM/IMCYTCHM 1ST ANTB: CPT | Performed by: PATHOLOGY

## 2024-12-09 NOTE — PROGRESS NOTES
Spoke with patient and she will arrive at 0630 at [facility] on 12/10/2024 for her procedure at 0800.

## 2024-12-10 ENCOUNTER — ANESTHESIA (OUTPATIENT)
Dept: ENDOSCOPY | Age: 69
End: 2024-12-10
Payer: COMMERCIAL

## 2024-12-10 ENCOUNTER — HOSPITAL ENCOUNTER (OUTPATIENT)
Age: 69
Setting detail: OUTPATIENT SURGERY
Discharge: HOME | End: 2024-12-10
Attending: INTERNAL MEDICINE | Admitting: INTERNAL MEDICINE
Payer: COMMERCIAL

## 2024-12-10 VITALS
WEIGHT: 231 LBS | BODY MASS INDEX: 39.44 KG/M2 | SYSTOLIC BLOOD PRESSURE: 116 MMHG | OXYGEN SATURATION: 96 % | DIASTOLIC BLOOD PRESSURE: 66 MMHG | HEIGHT: 64 IN | HEART RATE: 60 BPM | RESPIRATION RATE: 18 BRPM | TEMPERATURE: 97.6 F

## 2024-12-10 DIAGNOSIS — R14.0 BLOATING: ICD-10-CM

## 2024-12-10 DIAGNOSIS — R12 HEARTBURN: ICD-10-CM

## 2024-12-10 DIAGNOSIS — R05.9 COUGH, UNSPECIFIED: ICD-10-CM

## 2024-12-10 PROBLEM — R13.19 ESOPHAGEAL DYSPHAGIA: Status: ACTIVE | Noted: 2024-12-10

## 2024-12-10 PROCEDURE — 3700000000 HC ANESTHESIA ATTENDED CARE: Performed by: INTERNAL MEDICINE

## 2024-12-10 PROCEDURE — C1726 CATH, BAL DIL, NON-VASCULAR: HCPCS | Performed by: INTERNAL MEDICINE

## 2024-12-10 PROCEDURE — 2709999900 HC NON-CHARGEABLE SUPPLY: Performed by: INTERNAL MEDICINE

## 2024-12-10 PROCEDURE — 3609017700 HC EGD DILATION GASTRIC/DUODENAL STRICTURE: Performed by: INTERNAL MEDICINE

## 2024-12-10 PROCEDURE — 6360000002 HC RX W HCPCS

## 2024-12-10 PROCEDURE — 3700000001 HC ADD 15 MINUTES (ANESTHESIA): Performed by: INTERNAL MEDICINE

## 2024-12-10 PROCEDURE — 7100000011 HC PHASE II RECOVERY - ADDTL 15 MIN: Performed by: INTERNAL MEDICINE

## 2024-12-10 PROCEDURE — 7100000010 HC PHASE II RECOVERY - FIRST 15 MIN: Performed by: INTERNAL MEDICINE

## 2024-12-10 RX ORDER — LIDOCAINE HYDROCHLORIDE 20 MG/ML
INJECTION, SOLUTION EPIDURAL; INFILTRATION; INTRACAUDAL; PERINEURAL
Status: DISCONTINUED | OUTPATIENT
Start: 2024-12-10 | End: 2024-12-10 | Stop reason: SDUPTHER

## 2024-12-10 RX ORDER — OMEPRAZOLE 40 MG/1
40 CAPSULE, DELAYED RELEASE ORAL
Qty: 30 CAPSULE | Refills: 2 | Status: SHIPPED | OUTPATIENT
Start: 2024-12-10

## 2024-12-10 RX ORDER — SODIUM CHLORIDE, SODIUM LACTATE, POTASSIUM CHLORIDE, CALCIUM CHLORIDE 600; 310; 30; 20 MG/100ML; MG/100ML; MG/100ML; MG/100ML
INJECTION, SOLUTION INTRAVENOUS CONTINUOUS
Status: DISCONTINUED | OUTPATIENT
Start: 2024-12-10 | End: 2024-12-10 | Stop reason: HOSPADM

## 2024-12-10 RX ORDER — SODIUM CHLORIDE 0.9 % (FLUSH) 0.9 %
5-40 SYRINGE (ML) INJECTION EVERY 12 HOURS SCHEDULED
Status: DISCONTINUED | OUTPATIENT
Start: 2024-12-10 | End: 2024-12-10 | Stop reason: HOSPADM

## 2024-12-10 RX ORDER — SODIUM CHLORIDE 0.9 % (FLUSH) 0.9 %
5-40 SYRINGE (ML) INJECTION PRN
Status: DISCONTINUED | OUTPATIENT
Start: 2024-12-10 | End: 2024-12-10 | Stop reason: HOSPADM

## 2024-12-10 RX ORDER — PROPOFOL 10 MG/ML
INJECTION, EMULSION INTRAVENOUS
Status: DISCONTINUED | OUTPATIENT
Start: 2024-12-10 | End: 2024-12-10 | Stop reason: SDUPTHER

## 2024-12-10 RX ORDER — SODIUM CHLORIDE 9 MG/ML
INJECTION, SOLUTION INTRAVENOUS PRN
Status: DISCONTINUED | OUTPATIENT
Start: 2024-12-10 | End: 2024-12-10 | Stop reason: HOSPADM

## 2024-12-10 RX ADMIN — LIDOCAINE HYDROCHLORIDE 60 MG: 20 INJECTION, SOLUTION EPIDURAL; INFILTRATION; INTRACAUDAL; PERINEURAL at 08:05

## 2024-12-10 RX ADMIN — PROPOFOL 220 MG: 10 INJECTION, EMULSION INTRAVENOUS at 08:05

## 2024-12-10 ASSESSMENT — PAIN SCALES - GENERAL: PAINLEVEL_OUTOF10: 0

## 2024-12-10 ASSESSMENT — PAIN - FUNCTIONAL ASSESSMENT: PAIN_FUNCTIONAL_ASSESSMENT: 0-10

## 2024-12-10 NOTE — ANESTHESIA PRE PROCEDURE
Department of Anesthesiology  Preprocedure Note       Name:  Lashawn Duque   Age:  69 y.o.  :  1955                                          MRN:  [id number]         Date:  12/10/2024      Surgeon: Surgeon(s):  Catrina Carbone MD    Procedure: Procedure(s):  ESOPHAGOGASTRODUODENOSCOPY BIOPSY    Medications prior to admission:   Prior to Admission medications    Medication Sig Start Date End Date Taking? Authorizing Provider   potassium chloride (KLOR-CON M) 10 MEQ extended release tablet Take 1 tablet by mouth 2 times daily 24 Yes Rocky Fisher MD   amLODIPine (NORVASC) 5 MG tablet Take 1 tablet by mouth once daily 10/29/24  Yes Germán Rodrigues PA   sotalol (BETAPACE) 80 MG tablet Take 1 tablet by mouth daily 9/20/24 3/19/25 Yes Comfort Parmar APRN - CNP   indapamide (LOZOL) 2.5 MG tablet TAKE 1 TABLET BY MOUTH IN THE MORNING 24  Yes Pedro Howell APRN - CNP   levothyroxine (SYNTHROID) 112 MCG tablet Take 1 tablet by mouth once daily 24  Yes Germán Rodrigues PA   rivaroxaban (XARELTO) 20 MG TABS tablet Take 1 tablet by mouth daily (with breakfast) 24   Comfort Parmar APRN - CNP       Current medications:    Current Facility-Administered Medications   Medication Dose Route Frequency Provider Last Rate Last Admin   • sodium chloride flush 0.9 % injection 5-40 mL  5-40 mL IntraVENous 2 times per day Della Arredondo APRN - CNP       • sodium chloride flush 0.9 % injection 5-40 mL  5-40 mL IntraVENous PRN Della Arredondo APRN - CNP       • 0.9 % sodium chloride infusion   IntraVENous PRN Della Arredondo APRN - CNP       • lactated ringers infusion   IntraVENous Continuous Della Arredondo APRN - CNP           Allergies:    Allergies   Allergen Reactions   • Seasonal        Problem List:    Patient Active Problem List   Diagnosis Code   • Essential hypertension I10   • Acquired hypothyroidism E03.9   • Excessive daytime sleepiness G47.19   • Encounter for 
\"HEPCAB\"    COVID-19 Screening (If Applicable):   Lab Results   Component Value Date/Time    COVID19 DETECTED BY PCR 10/04/2023 11:15 AM           Anesthesia Evaluation  Patient summary reviewed   history of anesthetic complications: history of difficult intubation.  Airway:           Dental:          Pulmonary:   (+)     sleep apnea:                                  Cardiovascular:    (+) hypertension:, valvular problems/murmurs: AS, dysrhythmias: atrial fibrillation         Beta Blocker:  Not on Beta Blocker      ROS comment: Stress test 6/2023:   Summary   Supervising physician Dr. Rosas .   Normal LV function.normal EDV   There is normal isotope uptake following exercise and at rest. There is no   evidence of exercise induced ischemia.   This is a normal study.      Recommendation   Recommendation: Routine follow-up.     Echo 6/2023:     Summary   Left ventricular function is normal, EF 55-60%.   Grade II diastolic dysfunction.   Mild concentric left ventricular hypertrophy.   Mildly dilated left atrium.   Mild aortic insufficiency with PHT of 602 msec.   Mitral annular calcification is present.   Mild to moderate, mild tricuspid and pulmonic mitral regurgitation is   present.   Mild mitral stenosis with a mean pressure gradient of 4 mmHg, and a MVA of   1.65cm^2.   Mildly elevated pulmonary artery pressure, RVSP 36 mmHg.   Dilation of the ascending aorta (4.1cm).   No evidence of pericardial effusion.         Neuro/Psych:   (+) headaches:            GI/Hepatic/Renal:   (+) morbid obesity          Endo/Other:    (+) Diabetes, hypothyroidism, blood dyscrasia: anticoagulation therapy:..                 Abdominal:             Vascular: negative vascular ROS.         Other Findings:             Anesthesia Plan      MAC     ASA 3       Induction: intravenous.                            DEBORAH Varela - CRNA   12/9/2024       Pre Anesthesia Assessment complete. Chart reviewed on 12/9/2024

## 2024-12-10 NOTE — PROGRESS NOTES
0828 Pt. Brought back to unit, bedside report received from BLAIR Granado. Pt. Is A&O, vss and family at bedside. Pt. Provided with water and educated on use of call light. Call light in reach.   0846 DC instructions reviewed. Pt. And family express understanding.  0902 Pt getting dressed.   0906 Pt. To DC home in private vehicle.

## 2024-12-10 NOTE — ANESTHESIA POSTPROCEDURE EVALUATION
Department of Anesthesiology  Postprocedure Note    Patient: Lashawn Duque  MRN: [id number]  YOB: 1955  Date of evaluation: 12/10/2024    Procedure Summary       Date: 12/10/24 Room / Location: [place] [address] / [facility]    Anesthesia Start: 0801 Anesthesia Stop: 0828    Procedure: ESOPHAGOGASTRODUODENOSCOPY DILATION BALLOON WITH 18MM-20MM UP TO 20MM WITH BIOPSY Diagnosis:       Heartburn      Bloating      Cough, unspecified      (Heartburn [R12])      (Bloating [R14.0])      (Cough, unspecified [R05.9])    Surgeons: Catrina Carbone MD Responsible Provider: Karthik Rodriguez MD    Anesthesia Type: MAC ASA Status: 3            Anesthesia Type: MAC    Maged Phase I: Maged Score: 10    Maged Phase II:      Anesthesia Post Evaluation    Patient location during evaluation: bedside  Patient participation: complete - patient participated  Level of consciousness: awake  Pain score: 0  Airway patency: patent  Nausea & Vomiting: no nausea and no vomiting  Cardiovascular status: hemodynamically stable  Respiratory status: room air  Hydration status: euvolemic  Pain management: adequate    There were no known notable events for this encounter.

## 2024-12-10 NOTE — H&P
ENDOSCOPY   Pre-operative History and Physical    Patient: Lashawn Duque  : 1955      History Obtained From:  patient, electronic medical record        HISTORY OF PRESENT ILLNESS:                The patient is a 69 y.o. female with significant past medical history as below who presents for EGD    Indication GERD, dysphagia, Abd bloating.     Past Medical History:        Diagnosis Date    A-fib (HCC) 2023    Acute kidney failure, unspecified (HCC) 2021    Diabetes mellitus (HCC)     diet controlled.    Difficult intubation     H/O 24 hour EKG monitoring 2019    Basic sinus rhythm Minimum HR 41 bpm APC noted brief SVT noted Pt reported symptoms of lightheadedness no arrhythmias noted.     H/O echocardiogram 2019    EF 55-60%, Grade II DD, Mild MR, TR & PHTN.    History of nuclear stress test 2019    normal study.    Hypertension     Hypopotassemia 2022    NELSON on CPAP     Thyroid disease        Past Surgical History:        Procedure Laterality Date    ANKLE FRACTURE SURGERY Right     x2     SECTION      COLONOSCOPY  2016    int hem, repeat in 10 years    OVARY REMOVAL      TOTAL KNEE ARTHROPLASTY Right 2024    RIGHT KNEE TOTAL ARTHROPLASTY ROBOTIC performed by Yan Martínez MD at [facility] OR         Current Medications:    Medications    Prior to Admission medications    Medication Sig Start Date End Date Taking? Authorizing Provider   potassium chloride (KLOR-CON M) 10 MEQ extended release tablet Take 1 tablet by mouth 2 times daily 24 Yes Rocky Fisher MD   amLODIPine (NORVASC) 5 MG tablet Take 1 tablet by mouth once daily 10/29/24  Yes Germán Rodrigues PA   sotalol (BETAPACE) 80 MG tablet Take 1 tablet by mouth daily 9/20/24 3/19/25 Yes Comfort Parmar APRN - CNP   indapamide (LOZOL) 2.5 MG tablet TAKE 1 TABLET BY MOUTH IN THE MORNING 24  Yes Pedro Howell APRN - CNP   levothyroxine (SYNTHROID) 112 MCG tablet Take 1 tablet by

## 2024-12-10 NOTE — DISCHARGE INSTRUCTIONS
problems. It's also a good idea to know your test results and keep a list of the medicines you take.  When should you call for help?  [facility] [phone] OR [facility] [phone]  Call 911 anytime you think you may need emergency care. For example, call if:  You passed out (lost consciousness).  You cough up blood.  You vomit blood or what looks like coffee grounds.  You pass maroon or very bloody stools.  Call your doctor now or seek immediate medical care if:  You have trouble swallowing.  You have belly pain.  Your stools are black and tarlike or have streaks of blood.  You are sick to your stomach or cannot keep fluids down.  Watch closely for changes in your health, and be sure to contact your doctor if:  Your throat still hurts after a day or two.  You do not get better as expected.   Where can you learn more?   Go to [email or web address] and sign in to your [employer] account. Enter J454 in the Search Health Information box to learn more about “Upper GI Endoscopy: What to Expect at Home.”    If you do not have an account, please click on the “Sign Up Now” link.   © 8935-4463 [employer]. Care instructions adapted under license by [employer]. This care instruction is for use with your licensed healthcare professional. If you have questions about a medical condition or this instruction, always ask your healthcare professional. [employer] disclaims any warranty or liability for your use of this information.  Content Version: 10.8.970891; Current as of: November 20, 2015          [facility]  [phone]    Do not drive, work around machines or use equipment.  Do not drink any alcoholic beverages.  Do not smoke while alone.  Avoid making important decisions.  Plan to spend a quiet, relaxed evening @ home.  Resume normal activities as you begin to feel better.  Eat lightly for your

## 2024-12-12 LAB — SURGICAL PATHOLOGY REPORT: NORMAL

## 2024-12-18 ENCOUNTER — TELEPHONE (OUTPATIENT)
Dept: GASTROENTEROLOGY | Age: 69
End: 2024-12-18

## 2024-12-18 NOTE — TELEPHONE ENCOUNTER
Pt called to get her results from her EDG, I told pt that her results showed that there were benign changes and that there was no H. Pylori infection. Pt would like to know if she needs to schedule a follow up appt.

## 2025-01-06 DIAGNOSIS — I10 ESSENTIAL HYPERTENSION: ICD-10-CM

## 2025-01-06 RX ORDER — INDAPAMIDE 2.5 MG/1
TABLET ORAL
Qty: 90 TABLET | Refills: 3 | Status: SHIPPED | OUTPATIENT
Start: 2025-01-06

## 2025-01-29 DIAGNOSIS — I10 ESSENTIAL HYPERTENSION: ICD-10-CM

## 2025-01-29 RX ORDER — AMLODIPINE BESYLATE 5 MG/1
5 TABLET ORAL DAILY
Qty: 90 TABLET | Refills: 0 | Status: SHIPPED | OUTPATIENT
Start: 2025-01-29

## 2025-02-11 ENCOUNTER — HOSPITAL ENCOUNTER (OUTPATIENT)
Age: 70
Discharge: HOME | End: 2025-02-11
Payer: COMMERCIAL

## 2025-02-11 DIAGNOSIS — I10 ESSENTIAL HYPERTENSION: ICD-10-CM

## 2025-02-11 DIAGNOSIS — N18.1 CHRONIC KIDNEY DISEASE, STAGE I: ICD-10-CM

## 2025-02-11 DIAGNOSIS — E87.6 HYPOPOTASSEMIA: ICD-10-CM

## 2025-02-11 DIAGNOSIS — N17.9 AKI (ACUTE KIDNEY INJURY) (HCC): ICD-10-CM

## 2025-02-11 LAB
ANION GAP SERPL CALCULATED.3IONS-SCNC: 14 MMOL/L (ref 4–16)
BUN SERPL-MCNC: 14 MG/DL (ref 6–23)
CALCIUM SERPL-MCNC: 9.9 MG/DL (ref 8.3–10.6)
CHLORIDE SERPL-SCNC: 94 MMOL/L (ref 99–110)
CO2 SERPL-SCNC: 26 MMOL/L (ref 21–32)
CREAT SERPL-MCNC: 0.9 MG/DL (ref 0.6–1.1)
GFR, ESTIMATED: 69 ML/MIN/1.73M2
GLUCOSE SERPL-MCNC: 98 MG/DL (ref 74–99)
MAGNESIUM SERPL-MCNC: 2 MG/DL (ref 1.8–2.4)
POTASSIUM SERPL-SCNC: 3.8 MMOL/L (ref 3.5–5.1)
SODIUM SERPL-SCNC: 134 MMOL/L (ref 135–145)

## 2025-02-11 PROCEDURE — 80048 BASIC METABOLIC PNL TOTAL CA: CPT

## 2025-02-11 PROCEDURE — 83735 ASSAY OF MAGNESIUM: CPT

## 2025-02-11 PROCEDURE — 36415 COLL VENOUS BLD VENIPUNCTURE: CPT

## 2025-03-06 ENCOUNTER — COMMUNITY OUTREACH (OUTPATIENT)
Dept: FAMILY MEDICINE CLINIC | Age: 70
End: 2025-03-06

## 2025-03-21 ENCOUNTER — OFFICE VISIT (OUTPATIENT)
Age: 70
End: 2025-03-21
Payer: COMMERCIAL

## 2025-03-21 VITALS
BODY MASS INDEX: 37.63 KG/M2 | WEIGHT: 220.4 LBS | HEART RATE: 60 BPM | HEIGHT: 64 IN | SYSTOLIC BLOOD PRESSURE: 134 MMHG | DIASTOLIC BLOOD PRESSURE: 82 MMHG

## 2025-03-21 DIAGNOSIS — I48.0 PAF (PAROXYSMAL ATRIAL FIBRILLATION) (HCC): Primary | ICD-10-CM

## 2025-03-21 DIAGNOSIS — I48.0 HYPERCOAGULABLE STATE DUE TO PAROXYSMAL ATRIAL FIBRILLATION (HCC): ICD-10-CM

## 2025-03-21 DIAGNOSIS — D68.69 HYPERCOAGULABLE STATE DUE TO PAROXYSMAL ATRIAL FIBRILLATION (HCC): ICD-10-CM

## 2025-03-21 DIAGNOSIS — Z79.899 ENCOUNTER FOR MONITORING SOTALOL THERAPY: ICD-10-CM

## 2025-03-21 DIAGNOSIS — Z51.81 ENCOUNTER FOR MONITORING SOTALOL THERAPY: ICD-10-CM

## 2025-03-21 DIAGNOSIS — I10 ESSENTIAL HYPERTENSION: ICD-10-CM

## 2025-03-21 PROCEDURE — 3075F SYST BP GE 130 - 139MM HG: CPT | Performed by: NURSE PRACTITIONER

## 2025-03-21 PROCEDURE — 1123F ACP DISCUSS/DSCN MKR DOCD: CPT | Performed by: NURSE PRACTITIONER

## 2025-03-21 PROCEDURE — 99214 OFFICE O/P EST MOD 30 MIN: CPT | Performed by: NURSE PRACTITIONER

## 2025-03-21 PROCEDURE — 3079F DIAST BP 80-89 MM HG: CPT | Performed by: NURSE PRACTITIONER

## 2025-03-21 PROCEDURE — 1159F MED LIST DOCD IN RCRD: CPT | Performed by: NURSE PRACTITIONER

## 2025-03-21 PROCEDURE — 93000 ELECTROCARDIOGRAM COMPLETE: CPT | Performed by: NURSE PRACTITIONER

## 2025-03-21 RX ORDER — SOTALOL HYDROCHLORIDE 80 MG/1
80 TABLET ORAL DAILY
Qty: 90 TABLET | Refills: 1 | Status: SHIPPED | OUTPATIENT
Start: 2025-03-21 | End: 2025-09-17

## 2025-03-21 ASSESSMENT — ENCOUNTER SYMPTOMS
ABDOMINAL PAIN: 0
SINUS PRESSURE: 0
ABDOMINAL DISTENTION: 0
COUGH: 0
COLOR CHANGE: 0
PHOTOPHOBIA: 0
SINUS PAIN: 0
SHORTNESS OF BREATH: 0
BLOOD IN STOOL: 0

## 2025-03-21 NOTE — PROGRESS NOTES
mouth daily 90 tablet 3    indapamide (LOZOL) 2.5 MG tablet TAKE 1 TABLET BY MOUTH IN THE MORNING 90 tablet 3    omeprazole (PRILOSEC) 40 MG delayed release capsule Take 1 capsule by mouth every morning (before breakfast) 30 capsule 2    potassium chloride (KLOR-CON M) 10 MEQ extended release tablet Take 1 tablet by mouth 2 times daily 180 tablet 3    rivaroxaban (XARELTO) 20 MG TABS tablet Take 1 tablet by mouth daily (with breakfast) 90 tablet 3    levothyroxine (SYNTHROID) 112 MCG tablet Take 1 tablet by mouth once daily 90 tablet 3    sotalol (BETAPACE) 80 MG tablet Take 1 tablet by mouth daily 90 tablet 1     No current facility-administered medications on file prior to visit.       Review of Systems:   Review of Systems   Constitutional:  Negative for activity change, fatigue and fever.   HENT:  Negative for congestion, sinus pressure and sinus pain.    Eyes:  Negative for photophobia and visual disturbance.   Respiratory:  Negative for cough and shortness of breath.    Cardiovascular:  Positive for palpitations. Negative for chest pain and leg swelling.   Gastrointestinal:  Negative for abdominal distention, abdominal pain and blood in stool.   Endocrine: Negative for cold intolerance and heat intolerance.   Genitourinary:  Negative for difficulty urinating, dysuria and hematuria.   Musculoskeletal:  Negative for arthralgias.   Skin:  Negative for color change and rash.   Allergic/Immunologic: Negative for food allergies.   Neurological:  Negative for dizziness, syncope and light-headedness.   Hematological:  Does not bruise/bleed easily.   Psychiatric/Behavioral:  Negative for agitation and confusion. The patient is nervous/anxious.      Examination:      Vitals:    03/21/25 1058   BP: 134/82   BP Site: Left Upper Arm   Patient Position: Sitting   BP Cuff Size: Small Adult   Pulse: 60   Weight: 100 kg (220 lb 6.4 oz)   Height: 1.626 m (5' 4\")            Body mass index is 37.83 kg/m².      Physical

## 2025-04-18 ENCOUNTER — HOSPITAL ENCOUNTER (OUTPATIENT)
Age: 70
Discharge: HOME | End: 2025-04-18
Payer: COMMERCIAL

## 2025-04-18 LAB
ANION GAP SERPL CALCULATED.3IONS-SCNC: 15 MMOL/L (ref 9–17)
BUN SERPL-MCNC: 16 MG/DL (ref 7–20)
CALCIUM SERPL-MCNC: 9.8 MG/DL (ref 8.3–10.6)
CHLORIDE SERPL-SCNC: 97 MMOL/L (ref 99–110)
CO2 SERPL-SCNC: 23 MMOL/L (ref 21–32)
CREAT SERPL-MCNC: 1 MG/DL (ref 0.6–1.2)
GFR, ESTIMATED: 64 ML/MIN/1.73M2
GLUCOSE SERPL-MCNC: 97 MG/DL (ref 74–99)
POTASSIUM SERPL-SCNC: 3.9 MMOL/L (ref 3.5–5.1)
SODIUM SERPL-SCNC: 135 MMOL/L (ref 136–145)

## 2025-04-18 PROCEDURE — 80048 BASIC METABOLIC PNL TOTAL CA: CPT

## 2025-04-18 PROCEDURE — 36415 COLL VENOUS BLD VENIPUNCTURE: CPT

## 2025-05-30 ENCOUNTER — TELEPHONE (OUTPATIENT)
Dept: CARDIOLOGY CLINIC | Age: 70
End: 2025-05-30

## 2025-05-30 NOTE — TELEPHONE ENCOUNTER
Called patient left a message she needs to see Dr. Gabriel for cardiac clearance. She has not been seen since 11/13/23

## 2025-06-07 NOTE — PATIENT INSTRUCTIONS
Patient Education        Recombinant Zoster (Shingles) Vaccine: What You Need to Know  Why get vaccinated? Recombinant zoster (shingles) vaccine can prevent shingles. Shingles (also called herpes zoster, or just zoster) is a painful skin rash, usually with blisters. In addition to the rash, shingles can cause fever, headache, chills, or upset stomach. More rarely, shingles can lead to pneumonia, hearing problems, blindness, brain inflammation (encephalitis), or death. The most common complication of shingles is long-term nerve pain called postherpetic neuralgia (PHN). PHN occurs in the areas where the shingles rash was, even after the rash clears up. It can last for months or years after the rash goes away. The pain from PHN can be severe and debilitating. About 10 to 18% of people who get shingles will experience PHN. The risk of PHN increases with age. An older adult with shingles is more likely to develop PHN and have longer lasting and more severe pain than a younger person with shingles. Shingles is caused by the varicella zoster virus, the same virus that causes chickenpox. After you have chickenpox, the virus stays in your body and can cause shingles later in life. Shingles cannot be passed from one person to another, but the virus that causes shingles can spread and cause chickenpox in someone who had never had chickenpox or received chickenpox vaccine. Recombinant shingles vaccine  Recombinant shingles vaccine provides strong protection against shingles. By preventing shingles, recombinant shingles vaccine also protects against PHN. Recombinant shingles vaccine is the preferred vaccine for the prevention of shingles. However, a different vaccine, live shingles vaccine, may be used in some circumstances. The recombinant shingles vaccine is recommended for adults 50 years and older without serious immune problems. It is given as a two-dose series.   This vaccine is also recommended for people who reaction could occur after the vaccinated person leaves the clinic. If you see signs of a severe allergic reaction (hives, swelling of the face and throat, difficulty breathing, a fast heartbeat, dizziness, or weakness), call 9-1-1 and get the person to the nearest hospital.  For other signs that concern you, call your health care provider. Adverse reactions should be reported to the Vaccine Adverse Event Reporting System (VAERS). Your health care provider will usually file this report, or you can do it yourself. Visit the VAERS website at www.vaers. [facility].gov or call 3-988.681.2603. VAERS is only for reporting reactions, and VAERS staff do not give medical advice. How can I learn more? · Ask your health care provider. · Call your local or state health department. · Contact the Centers for Disease Control and Prevention (CDC):  ? Call 4-239.816.8033 (1-800-CDC-INFO) or  ? Visit CDC's website at www.cdc.gov/vaccines  Vaccine Information Statement  Recombinant Zoster Vaccine  10/30/2019  [facility] and [facility] for Disease Control and Prevention  Many Vaccine Information Statements are available in Luxembourgish and other languages. See www.immunize.org/vis. Hojas de Información Sobre Vacunas están disponibles en Español y en muchos otros idiomas. Visite Sacha.si   Care instructions adapted under license by [facility] ([facility]). If you have questions about a medical condition or this instruction, always ask your healthcare professional. [place] [address] any warranty or liability for your use of this information. Patient Education        Pneumococcal Conjugate Vaccine (PCV13): What You Need to Know  Why get vaccinated? Pneumococcal conjugate vaccine (PCV13) can prevent pneumococcal disease. Pneumococcal disease refers to any illness caused by pneumococcal bacteria.  These bacteria can cause many types of illnesses, including pneumonia, which is an infection of the lungs. Pneumococcal bacteria are one of the most common causes of pneumonia. Besides pneumonia, pneumococcal bacteria can also cause:  · Ear infections  · Sinus infections  · Meningitis (infection of the tissue covering the brain and spinal cord)  · Bacteremia (bloodstream infection)  Anyone can get pneumococcal disease, but children under 3years of age, people with certain medical conditions, adults 72 years or older, and cigarette smokers are at the highest risk. Most pneumococcal infections are mild. However, some can result in long-term problems, such as brain damage or hearing loss. Meningitis, bacteremia, and pneumonia caused by pneumococcal disease can be fatal.  PCV13  PCV13 protects against 13 types of bacteria that cause pneumococcal disease. Infants and young children usually need 4 doses of pneumococcal conjugate vaccine, at 2, 4, 6, and 15 13months of age. In some cases, a child might need fewer than 4 doses to complete PCV13 vaccination. A dose of PCV13 vaccine is also recommended for anyone 2 years or older with certain medical conditions if they did not already receive PCV13. This vaccine may be given to adults 72 years or older based on discussions between the patient and health care provider. Talk with your health care provider  Tell your vaccine provider if the person getting the vaccine:  · Has had an allergic reaction after a previous dose of PCV13, to an earlier pneumococcal conjugate vaccine known as PCV7, or to any vaccine containing diphtheria toxoid (for example, DTaP), or has any severe, life-threatening allergies. · In some cases, your health care provider may decide to postpone PCV13 vaccination to a future visit. People with minor illnesses, such as a cold, may be vaccinated. People who are moderately or severely ill should usually wait until they recover before getting PCV13. Your health care provider can give you more information.   Risks of a vaccine reaction  · Redness, swelling, pain, or tenderness where the shot is given, and fever, loss of appetite, fussiness (irritability), feeling tired, headache, and chills can happen after PCV13. [facility] children may be at increased risk for seizures caused by fever after PCV13 if it is administered at the same time as inactivated influenza vaccine. Ask your health care provider for more information. People sometimes faint after medical procedures, including vaccination. Tell your provider if you feel dizzy or have vision changes or ringing in the ears. As with any medicine, there is a very remote chance of a vaccine causing a severe allergic reaction, other serious injury, or death. What if there is a serious problem? An allergic reaction could occur after the vaccinated person leaves the clinic. If you see signs of a severe allergic reaction (hives, swelling of the face and throat, difficulty breathing, a fast heartbeat, dizziness, or weakness), call 9-1-1 and get the person to the nearest hospital.  For other signs that concern you, call your health care provider. Adverse reactions should be reported to the Vaccine Adverse Event Reporting System (VAERS). Your health care provider will usually file this report, or you can do it yourself. Visit the VAERS website at www.vaers. hhs.gov or call 9-958.812.4584. VAERS is only for reporting reactions, and VAERS staff do not give medical advice. The National Vaccine Injury Compensation Program  The National Vaccine Injury Compensation Program (VICP) is a federal program that was created to compensate people who may have been injured by certain vaccines. Visit the VICP website at www.hrsa.gov/vaccinecompensation or call 9-802.444.9703 to learn about the program and about filing a claim. There is a time limit to file a claim for compensation. How can I learn more? · Ask your health care provider. · Call your local or state health department.   · Contact the [facility] Disease Control and Prevention (CDC):  ? Call 5-483.285.2084 (1-800-CDC-INFO) or  ? Visit CDC's website at www.cdc.gov/vaccines  Vaccine Information Statement (Interim)  PCV13  10/30/2019  42 DIRK Khalil [id number]  Department of Health and Human Services  Centers for Disease Control and Prevention  Many Vaccine Information Statements are available in Amharic and other languages. See www.immunize.org/vis. Muchas hojas de información sobre vacunas están disponibles en español y en otros idiomas. Visite www.immunize.org/vis. Care instructions adapted under license by [facility] ([facility]). If you have questions about a medical condition or this instruction, always ask your healthcare professional. [place] [address] any warranty or liability for your use of this information. Patient Education        Pneumococcal Polysaccharide Vaccine: What You Need to Know  Why get vaccinated? Pneumococcal polysaccharide vaccine (PPSV23) can prevent pneumococcal disease. Pneumococcal disease refers to any illness caused by pneumococcal bacteria. These bacteria can cause many types of illnesses, including pneumonia, which is an infection of the lungs. Pneumococcal bacteria are one of the most common causes of pneumonia. Besides pneumonia, pneumococcal bacteria can also cause:  · Ear infections,  · Sinus infections  · Meningitis (infection of the tissue covering the brain and spinal cord)  · Bacteremia (bloodstream infection)  Anyone can get pneumococcal disease, but children under 3years of age, people with certain medical conditions, adults 72 years or older, and cigarette smokers are at the highest risk. Most pneumococcal infections are mild. However, some can result in long-term problems, such as brain damage or hearing loss. Meningitis, bacteremia, and pneumonia caused by pneumococcal disease can be fatal.  PPSV23  PPSV23 protects against 23 types of bacteria that cause pneumococcal disease.   PPSV23 is recommended for:  · All adults 72 years or older,  · Anyone 2 years or older with certain medical conditions that can lead to an increased risk for pneumococcal disease. Most people need only one dose of PPSV23. A second dose of PPSV23, and another type of pneumococcal vaccine called PCV13, are recommended for certain high-risk groups. Your health care provider can give you more information. People 65 years or older should get a dose of PPSV23 even if they have already gotten one or more doses of the vaccine before they turned 65. Talk with your health care provider  Tell your vaccine provider if the person getting the vaccine:  · Has had an allergic reaction after a previous dose of PPSV23, or has any severe, life-threatening allergies. In some cases, your health care provider may decide to postpone PPSV23 vaccination to a future visit. People with minor illnesses, such as a cold, may be vaccinated. People who are moderately or severely ill should usually wait until they recover before getting PPSV23. Your health care provider can give you more information. Risks of a vaccine reaction  · Redness or pain where the shot is given, feeling tired, fever, or muscle aches can happen after PPSV23. People sometimes faint after medical procedures, including vaccination. Tell your provider if you feel dizzy or have vision changes or ringing in the ears. As with any medicine, there is a very remote chance of a vaccine causing a severe allergic reaction, other serious injury, or death. What if there is a serious problem? An allergic reaction could occur after the vaccinated person leaves the clinic. If you see signs of a severe allergic reaction (hives, swelling of the face and throat, difficulty breathing, a fast heartbeat, dizziness, or weakness), call 9-1-1 and get the person to the nearest hospital.  For other signs that concern you, call your health care provider.   Adverse reactions should be reported to the Vaccine Adverse Event Reporting System (VAERS). Your health care provider will usually file this report, or you can do it yourself. Visit the VAERS website at www.vaers. hhs.gov at www.vaers. hhs.gov or call 9-834.377.1718. VAERS is only for reporting reactions, and VAERS staff do not give medical advice. How can I learn more? · Ask your health care provider. · Call your local or state health department. · Contact the Centers for Disease Control and Prevention (CDC):  ? Call 3-276.686.6495 (1-800-CDC-INFO) or  ? Visit CDC's website at www.cdc.gov/vaccines  Vaccine Information Statement  PPSV23 Vaccine  10/30/2019  [facility] for Disease Control and Prevention  Many Vaccine Information Statements are available in English and other languages. See www.immunize.org/vis. Hojas de información Sobre Vacunas están disponibles en español y en muchos otros idiomas. Visite Sacha.si. Care instructions adapted under license by [facility] ([facility]). If you have questions about a medical condition or this instruction, always ask your healthcare professional. [place] [address] any warranty or liability for your use of this information. complains of pain/discomfort

## 2025-06-28 DIAGNOSIS — E03.9 ACQUIRED HYPOTHYROIDISM: ICD-10-CM

## 2025-06-30 ENCOUNTER — HOSPITAL ENCOUNTER (OUTPATIENT)
Age: 70
Discharge: HOME | End: 2025-06-30
Payer: COMMERCIAL

## 2025-06-30 DIAGNOSIS — I10 ESSENTIAL HYPERTENSION: ICD-10-CM

## 2025-06-30 DIAGNOSIS — N18.1 CHRONIC KIDNEY DISEASE, STAGE I: ICD-10-CM

## 2025-06-30 DIAGNOSIS — N17.9 AKI (ACUTE KIDNEY INJURY): ICD-10-CM

## 2025-06-30 LAB
ANION GAP SERPL CALCULATED.3IONS-SCNC: 13 MMOL/L (ref 9–17)
BUN SERPL-MCNC: 17 MG/DL (ref 7–20)
CALCIUM SERPL-MCNC: 9.9 MG/DL (ref 8.3–10.6)
CHLORIDE SERPL-SCNC: 98 MMOL/L (ref 99–110)
CO2 SERPL-SCNC: 27 MMOL/L (ref 21–32)
CREAT SERPL-MCNC: 0.9 MG/DL (ref 0.6–1.2)
GFR, ESTIMATED: 67 ML/MIN/1.73M2
GLUCOSE SERPL-MCNC: 99 MG/DL (ref 74–99)
POTASSIUM SERPL-SCNC: 3.8 MMOL/L (ref 3.5–5.1)
SODIUM SERPL-SCNC: 137 MMOL/L (ref 136–145)

## 2025-06-30 PROCEDURE — 80048 BASIC METABOLIC PNL TOTAL CA: CPT

## 2025-06-30 PROCEDURE — 36415 COLL VENOUS BLD VENIPUNCTURE: CPT

## 2025-07-02 ENCOUNTER — OFFICE VISIT (OUTPATIENT)
Dept: CARDIOLOGY CLINIC | Age: 70
End: 2025-07-02
Payer: COMMERCIAL

## 2025-07-02 VITALS
HEART RATE: 60 BPM | WEIGHT: 216.2 LBS | OXYGEN SATURATION: 97 % | DIASTOLIC BLOOD PRESSURE: 82 MMHG | HEIGHT: 64 IN | BODY MASS INDEX: 36.91 KG/M2 | SYSTOLIC BLOOD PRESSURE: 126 MMHG

## 2025-07-02 DIAGNOSIS — I48.0 PAF (PAROXYSMAL ATRIAL FIBRILLATION) (HCC): Primary | ICD-10-CM

## 2025-07-02 PROCEDURE — 93000 ELECTROCARDIOGRAM COMPLETE: CPT | Performed by: INTERNAL MEDICINE

## 2025-07-02 PROCEDURE — 3079F DIAST BP 80-89 MM HG: CPT | Performed by: INTERNAL MEDICINE

## 2025-07-02 PROCEDURE — 99214 OFFICE O/P EST MOD 30 MIN: CPT | Performed by: INTERNAL MEDICINE

## 2025-07-02 PROCEDURE — 1123F ACP DISCUSS/DSCN MKR DOCD: CPT | Performed by: INTERNAL MEDICINE

## 2025-07-02 PROCEDURE — 3074F SYST BP LT 130 MM HG: CPT | Performed by: INTERNAL MEDICINE

## 2025-07-02 PROCEDURE — 1159F MED LIST DOCD IN RCRD: CPT | Performed by: INTERNAL MEDICINE

## 2025-07-02 RX ORDER — LEVOTHYROXINE SODIUM 112 UG/1
112 TABLET ORAL DAILY
Qty: 90 TABLET | Refills: 0 | Status: SHIPPED | OUTPATIENT
Start: 2025-07-02

## 2025-07-02 NOTE — PROGRESS NOTES
Minnie Gabriel MD        OFFICE  FOLLOWUP NOTE    Chief complaints: patient is here for management of  HTN, shortness of breath, hypothyroidsim, SVT, PAF, COVID 19,MILD MS   Subjective: patient feels better, no chest pain, no shortness of breath, no dizziness, no palpitations    SHANTAL Hardin is a 70 y.o.year old who  has a past medical history of A-fib (HCC), Acute kidney failure, unspecified, Diabetes mellitus (HCC), Difficult intubation, H/O 24 hour EKG monitoring, H/O echocardiogram, History of nuclear stress test, Hypertension, Hypopotassemia, NELSON on CPAP, and Thyroid disease. and presents for management of  HTN, shortness of breath, hypothyroidsim, SVT, PAF, COVID 19,MILD MS , which are well controlled      Current Outpatient Medications   Medication Sig Dispense Refill    levothyroxine (SYNTHROID) 112 MCG tablet Take 1 tablet by mouth once daily 90 tablet 0    sotalol (BETAPACE) 80 MG tablet Take 1 tablet by mouth daily 90 tablet 1    rivaroxaban (XARELTO) 20 MG TABS tablet Take 1 tablet by mouth daily (with breakfast) 90 tablet 3    amLODIPine (NORVASC) 5 MG tablet Take 1 tablet by mouth daily 90 tablet 3    indapamide (LOZOL) 2.5 MG tablet TAKE 1 TABLET BY MOUTH IN THE MORNING 90 tablet 3    potassium chloride (KLOR-CON M) 10 MEQ extended release tablet Take 1 tablet by mouth 2 times daily 180 tablet 3    omeprazole (PRILOSEC) 40 MG delayed release capsule Take 1 capsule by mouth every morning (before breakfast) (Patient not taking: Reported on 7/2/2025) 30 capsule 2     No current facility-administered medications for this visit.     Allergies: Environmental/seasonal  Past Medical History:   Diagnosis Date    A-fib (HCC) 07/2023    Acute kidney failure, unspecified 08/30/2021    Diabetes mellitus (HCC)     diet controlled.    Difficult intubation     H/O 24 hour EKG monitoring 11/06/2019    Basic sinus rhythm Minimum HR 41 bpm APC noted brief SVT noted Pt reported symptoms of 
40

## 2025-07-16 ENCOUNTER — OFFICE VISIT (OUTPATIENT)
Dept: FAMILY MEDICINE CLINIC | Age: 70
End: 2025-07-16
Payer: COMMERCIAL

## 2025-07-16 VITALS
HEIGHT: 64 IN | DIASTOLIC BLOOD PRESSURE: 82 MMHG | SYSTOLIC BLOOD PRESSURE: 134 MMHG | RESPIRATION RATE: 18 BRPM | OXYGEN SATURATION: 97 % | BODY MASS INDEX: 36.7 KG/M2 | WEIGHT: 215 LBS | HEART RATE: 62 BPM

## 2025-07-16 DIAGNOSIS — I10 ESSENTIAL HYPERTENSION: ICD-10-CM

## 2025-07-16 DIAGNOSIS — E03.9 ACQUIRED HYPOTHYROIDISM: ICD-10-CM

## 2025-07-16 DIAGNOSIS — R73.01 IFG (IMPAIRED FASTING GLUCOSE): ICD-10-CM

## 2025-07-16 DIAGNOSIS — Z01.818 PREOP EXAMINATION: Primary | ICD-10-CM

## 2025-07-16 DIAGNOSIS — H26.9 CATARACT OF BOTH EYES, UNSPECIFIED CATARACT TYPE: ICD-10-CM

## 2025-07-16 PROCEDURE — 93000 ELECTROCARDIOGRAM COMPLETE: CPT | Performed by: PHYSICIAN ASSISTANT

## 2025-07-16 PROCEDURE — 3079F DIAST BP 80-89 MM HG: CPT | Performed by: PHYSICIAN ASSISTANT

## 2025-07-16 PROCEDURE — 3075F SYST BP GE 130 - 139MM HG: CPT | Performed by: PHYSICIAN ASSISTANT

## 2025-07-16 PROCEDURE — 99213 OFFICE O/P EST LOW 20 MIN: CPT | Performed by: PHYSICIAN ASSISTANT

## 2025-07-16 RX ORDER — LEVOTHYROXINE SODIUM 112 UG/1
112 TABLET ORAL DAILY
Qty: 90 TABLET | Refills: 0 | Status: SHIPPED | OUTPATIENT
Start: 2025-07-16

## 2025-07-16 SDOH — ECONOMIC STABILITY: FOOD INSECURITY: WITHIN THE PAST 12 MONTHS, THE FOOD YOU BOUGHT JUST DIDN'T LAST AND YOU DIDN'T HAVE MONEY TO GET MORE.: NEVER TRUE

## 2025-07-16 SDOH — ECONOMIC STABILITY: FOOD INSECURITY: WITHIN THE PAST 12 MONTHS, YOU WORRIED THAT YOUR FOOD WOULD RUN OUT BEFORE YOU GOT MONEY TO BUY MORE.: NEVER TRUE

## 2025-07-16 ASSESSMENT — PATIENT HEALTH QUESTIONNAIRE - PHQ9
1. LITTLE INTEREST OR PLEASURE IN DOING THINGS: NOT AT ALL
SUM OF ALL RESPONSES TO PHQ QUESTIONS 1-9: 0
2. FEELING DOWN, DEPRESSED OR HOPELESS: NOT AT ALL

## 2025-07-16 NOTE — PROGRESS NOTES
Preoperative Consultation    Lashawn Duque  YOB: 1955    This patient presents to the office today for a preoperative consultation at the request of surgeon, Dr. Beebe, who plans on performing Left Cataract on  at [facility].      Surgical History: knee replacement      Planned Anesthesia: IV sedation   MAC (conscious sedation) I.e. colonoscopy  Known personal History of Anesthesia Problems: None   Family History of Anesthesia Problems: none  Bleeding Risk: No recent or remote history of abnormal bleeding  Personal or FH of DVT/PE: No      Patient Active Problem List   Diagnosis    Essential hypertension    Acquired hypothyroidism    Excessive daytime sleepiness    Encounter for monitoring sotalol therapy    Primary osteoarthritis of right knee    NELSON on CPAP    New onset of headaches after age 50    Hypopotassemia    Chronic kidney disease, stage I    Severe obesity (BMI 35.0-39.9) with comorbidity (HCC)    Other fatigue    IFG (impaired fasting glucose)    Hypercoagulable state due to paroxysmal atrial fibrillation (HCC)    TEZ (acute kidney injury)    PAF (paroxysmal atrial fibrillation) (HCC)    S/P total knee arthroplasty, right    Indigestion    Heartburn    Bloating    Cough, unspecified    Esophageal dysphagia     Past Surgical History:   Procedure Laterality Date    ANKLE FRACTURE SURGERY Right     x2     SECTION      COLONOSCOPY  2016    int hem, repeat in 10 years    OVARY REMOVAL      TOTAL KNEE ARTHROPLASTY Right 2024    RIGHT KNEE TOTAL ARTHROPLASTY ROBOTIC performed by Yan Martínez MD at [facility] OR    UPPER GASTROINTESTINAL ENDOSCOPY N/A 12/10/2024    ESOPHAGOGASTRODUODENOSCOPY DILATION BALLOON WITH 18MM-20MM UP TO 20MM WITH BIOPSY performed by Catrina Carbone MD at [facility] ENDOSCOPY       Allergies   Allergen Reactions    Environmental/Seasonal      Outpatient Medications Marked as Taking for the 25 encounter (Office Visit) with Germán Rodrigues PA

## 2025-07-17 LAB
ALBUMIN SERPL-MCNC: 4.4 G/DL (ref 3.4–5)
ALBUMIN/GLOB SERPL: 1.8 {RATIO} (ref 1.1–2.2)
ALP SERPL-CCNC: 80 U/L (ref 40–129)
ALT SERPL-CCNC: 13 U/L (ref 10–40)
ANION GAP SERPL CALCULATED.3IONS-SCNC: 13 MMOL/L (ref 3–16)
AST SERPL-CCNC: 16 U/L (ref 15–37)
BASOPHILS # BLD: 0 K/UL (ref 0–0.2)
BASOPHILS NFR BLD: 0.7 %
BILIRUB SERPL-MCNC: 0.5 MG/DL (ref 0–1)
BUN SERPL-MCNC: 14 MG/DL (ref 7–20)
CALCIUM SERPL-MCNC: 9.8 MG/DL (ref 8.3–10.6)
CHLORIDE SERPL-SCNC: 97 MMOL/L (ref 99–110)
CHOLEST SERPL-MCNC: 204 MG/DL (ref 0–199)
CO2 SERPL-SCNC: 27 MMOL/L (ref 21–32)
CREAT SERPL-MCNC: 1 MG/DL (ref 0.6–1.2)
DEPRECATED RDW RBC AUTO: 14.6 % (ref 12.4–15.4)
EOSINOPHIL # BLD: 0.1 K/UL (ref 0–0.6)
EOSINOPHIL NFR BLD: 1.8 %
GFR SERPLBLD CREATININE-BSD FMLA CKD-EPI: 61 ML/MIN/{1.73_M2}
GLUCOSE SERPL-MCNC: 92 MG/DL (ref 70–99)
HCT VFR BLD AUTO: 41.1 % (ref 36–48)
HDLC SERPL-MCNC: 52 MG/DL (ref 40–60)
HGB BLD-MCNC: 14.2 G/DL (ref 12–16)
LDL CHOLESTEROL: 126 MG/DL
LYMPHOCYTES # BLD: 1.5 K/UL (ref 1–5.1)
LYMPHOCYTES NFR BLD: 22.8 %
MCH RBC QN AUTO: 30.2 PG (ref 26–34)
MCHC RBC AUTO-ENTMCNC: 34.4 G/DL (ref 31–36)
MCV RBC AUTO: 87.9 FL (ref 80–100)
MONOCYTES # BLD: 0.3 K/UL (ref 0–1.3)
MONOCYTES NFR BLD: 4.6 %
NEUTROPHILS # BLD: 4.8 K/UL (ref 1.7–7.7)
NEUTROPHILS NFR BLD: 70.1 %
PLATELET # BLD AUTO: 209 K/UL (ref 135–450)
PMV BLD AUTO: 11.9 FL (ref 5–10.5)
POTASSIUM SERPL-SCNC: 3.9 MMOL/L (ref 3.5–5.1)
PROT SERPL-MCNC: 6.9 G/DL (ref 6.4–8.2)
RBC # BLD AUTO: 4.68 M/UL (ref 4–5.2)
SODIUM SERPL-SCNC: 137 MMOL/L (ref 136–145)
TRIGL SERPL-MCNC: 128 MG/DL (ref 0–150)
TSH SERPL DL<=0.005 MIU/L-ACNC: 1.58 UIU/ML (ref 0.27–4.2)
VLDLC SERPL CALC-MCNC: 26 MG/DL
WBC # BLD AUTO: 6.8 K/UL (ref 4–11)

## 2025-07-22 ENCOUNTER — TELEPHONE (OUTPATIENT)
Dept: CARDIOLOGY CLINIC | Age: 70
End: 2025-07-22

## 2025-08-07 ENCOUNTER — HOSPITAL ENCOUNTER (OUTPATIENT)
Age: 70
Discharge: HOME | End: 2025-08-07
Payer: COMMERCIAL

## 2025-08-07 DIAGNOSIS — N17.9 AKI (ACUTE KIDNEY INJURY): ICD-10-CM

## 2025-08-07 DIAGNOSIS — N18.1 CHRONIC KIDNEY DISEASE, STAGE I: ICD-10-CM

## 2025-08-07 DIAGNOSIS — I10 ESSENTIAL HYPERTENSION: ICD-10-CM

## 2025-08-07 LAB
ANION GAP SERPL CALCULATED.3IONS-SCNC: 13 MMOL/L (ref 9–17)
BUN SERPL-MCNC: 16 MG/DL (ref 7–20)
CALCIUM SERPL-MCNC: 9.8 MG/DL (ref 8.3–10.6)
CHLORIDE SERPL-SCNC: 96 MMOL/L (ref 99–110)
CO2 SERPL-SCNC: 28 MMOL/L (ref 21–32)
CREAT SERPL-MCNC: 0.9 MG/DL (ref 0.6–1.2)
GFR, ESTIMATED: 65 ML/MIN/1.73M2
GLUCOSE SERPL-MCNC: 113 MG/DL (ref 74–99)
POTASSIUM SERPL-SCNC: 3.7 MMOL/L (ref 3.5–5.1)
SODIUM SERPL-SCNC: 137 MMOL/L (ref 136–145)

## 2025-08-07 PROCEDURE — 80048 BASIC METABOLIC PNL TOTAL CA: CPT

## 2025-08-07 PROCEDURE — 36415 COLL VENOUS BLD VENIPUNCTURE: CPT

## 2025-08-12 ENCOUNTER — OFFICE VISIT (OUTPATIENT)
Dept: FAMILY MEDICINE CLINIC | Age: 70
End: 2025-08-12
Payer: COMMERCIAL

## 2025-08-12 VITALS
BODY MASS INDEX: 36.4 KG/M2 | OXYGEN SATURATION: 97 % | HEIGHT: 64 IN | RESPIRATION RATE: 18 BRPM | DIASTOLIC BLOOD PRESSURE: 76 MMHG | WEIGHT: 213.2 LBS | SYSTOLIC BLOOD PRESSURE: 130 MMHG | HEART RATE: 60 BPM

## 2025-08-12 DIAGNOSIS — H26.9 CATARACT OF BOTH EYES, UNSPECIFIED CATARACT TYPE: ICD-10-CM

## 2025-08-12 DIAGNOSIS — Z01.818 PREOP EXAMINATION: Primary | ICD-10-CM

## 2025-08-12 PROCEDURE — 3078F DIAST BP <80 MM HG: CPT | Performed by: PHYSICIAN ASSISTANT

## 2025-08-12 PROCEDURE — 3075F SYST BP GE 130 - 139MM HG: CPT | Performed by: PHYSICIAN ASSISTANT

## 2025-08-12 PROCEDURE — 99213 OFFICE O/P EST LOW 20 MIN: CPT | Performed by: PHYSICIAN ASSISTANT

## 2025-08-12 ASSESSMENT — PATIENT HEALTH QUESTIONNAIRE - PHQ9
2. FEELING DOWN, DEPRESSED OR HOPELESS: NOT AT ALL
1. LITTLE INTEREST OR PLEASURE IN DOING THINGS: NOT AT ALL
SUM OF ALL RESPONSES TO PHQ QUESTIONS 1-9: 0

## 2025-08-15 ENCOUNTER — CLINICAL SUPPORT (OUTPATIENT)
Dept: FAMILY MEDICINE CLINIC | Age: 70
End: 2025-08-15
Payer: COMMERCIAL

## 2025-08-15 DIAGNOSIS — Z01.818 PREOP EXAMINATION: ICD-10-CM

## 2025-08-15 PROCEDURE — 36415 COLL VENOUS BLD VENIPUNCTURE: CPT | Performed by: PHYSICIAN ASSISTANT

## 2025-08-16 LAB
ALBUMIN SERPL-MCNC: 4.2 G/DL (ref 3.4–5)
ALBUMIN/GLOB SERPL: 1.6 {RATIO} (ref 1.1–2.2)
ALP SERPL-CCNC: 73 U/L (ref 40–129)
ALT SERPL-CCNC: 12 U/L (ref 10–40)
ANION GAP SERPL CALCULATED.3IONS-SCNC: 10 MMOL/L (ref 3–16)
AST SERPL-CCNC: 18 U/L (ref 15–37)
BASOPHILS # BLD: 0.1 K/UL (ref 0–0.2)
BASOPHILS NFR BLD: 0.8 %
BILIRUB SERPL-MCNC: 0.5 MG/DL (ref 0–1)
BUN SERPL-MCNC: 16 MG/DL (ref 7–20)
CALCIUM SERPL-MCNC: 9.7 MG/DL (ref 8.3–10.6)
CHLORIDE SERPL-SCNC: 99 MMOL/L (ref 99–110)
CO2 SERPL-SCNC: 26 MMOL/L (ref 21–32)
CREAT SERPL-MCNC: 0.9 MG/DL (ref 0.6–1.2)
DEPRECATED RDW RBC AUTO: 13.8 % (ref 12.4–15.4)
EOSINOPHIL # BLD: 0.2 K/UL (ref 0–0.6)
EOSINOPHIL NFR BLD: 3.3 %
GFR SERPLBLD CREATININE-BSD FMLA CKD-EPI: 69 ML/MIN/{1.73_M2}
GLUCOSE SERPL-MCNC: 98 MG/DL (ref 70–99)
HCT VFR BLD AUTO: 40.8 % (ref 36–48)
HGB BLD-MCNC: 13.7 G/DL (ref 12–16)
LYMPHOCYTES # BLD: 1.7 K/UL (ref 1–5.1)
LYMPHOCYTES NFR BLD: 23.6 %
MCH RBC QN AUTO: 29.6 PG (ref 26–34)
MCHC RBC AUTO-ENTMCNC: 33.6 G/DL (ref 31–36)
MCV RBC AUTO: 88 FL (ref 80–100)
MONOCYTES # BLD: 0.4 K/UL (ref 0–1.3)
MONOCYTES NFR BLD: 5.1 %
NEUTROPHILS # BLD: 4.8 K/UL (ref 1.7–7.7)
NEUTROPHILS NFR BLD: 67.2 %
PLATELET # BLD AUTO: 223 K/UL (ref 135–450)
PMV BLD AUTO: 11.9 FL (ref 5–10.5)
POTASSIUM SERPL-SCNC: 3.8 MMOL/L (ref 3.5–5.1)
PROT SERPL-MCNC: 6.8 G/DL (ref 6.4–8.2)
RBC # BLD AUTO: 4.64 M/UL (ref 4–5.2)
SODIUM SERPL-SCNC: 135 MMOL/L (ref 136–145)
WBC # BLD AUTO: 7.2 K/UL (ref 4–11)

## (undated) DEVICE — CORD RETRCT SIL

## (undated) DEVICE — 3M™ STERI-DRAPE™ U-DRAPE 1015: Brand: STERI-DRAPE™

## (undated) DEVICE — SYRINGE MEDICAL 3ML CLEAR PLASTIC STANDARD NON CONTROL LUERLOCK TIP DISPOSABLE

## (undated) DEVICE — SUTURE ABSORBABLE 3-0 PS-1 18 IN UD MONOCRYL + STRATAFIX SXMP1B102

## (undated) DEVICE — STOCKING,ANTI-EMBOLISM,T-L,XL REG,LF: Brand: MEDLINE

## (undated) DEVICE — SYRINGE MED 30ML STD CLR PLAS LUERLOCK TIP N CTRL DISP

## (undated) DEVICE — KIT INT FIX FEM TIB CKPT MAKOPLASTY

## (undated) DEVICE — PIN BNE FIX TEMP L140MM DIA4MM MAKO

## (undated) DEVICE — SYRINGE INFL 60ML DISP ALLIANCE II

## (undated) DEVICE — Z INACTIVE NO ACTIVE SUPPLIER APPLICATOR MEDICATED 26 CC TINT HI-LITE ORNG STRL CHLORAPREP

## (undated) DEVICE — BLADE SURG SAW STD S STL OSC W/ SERR EDGE DISP

## (undated) DEVICE — BOOT POS LEG DEMAYO

## (undated) DEVICE — TOWEL,OR,DSP,ST,BLUE,STD,6/PK,12PK/CS: Brand: MEDLINE

## (undated) DEVICE — GLOVE ORANGE PI 7 1/2   MSG9075

## (undated) DEVICE — 4-PORT MANIFOLD: Brand: NEPTUNE 2

## (undated) DEVICE — GLOVE ORANGE PI 8   MSG9080

## (undated) DEVICE — PIN BNE FIX L110MM DIA32MM

## (undated) DEVICE — SUTURE VCRL SZ 2-0 L18IN ABSRB UD CT-1 L36MM 1/2 CIR J839D

## (undated) DEVICE — ZIMMER® STERILE DISPOSABLE TOURNIQUET CUFF WITH PLC, DUAL PORT, SINGLE BLADDER, 34 IN. (86 CM)

## (undated) DEVICE — NEEDLE HYPO 20GA L1.5IN YEL POLYPR HUB S STL REG BVL STR

## (undated) DEVICE — SOLUTION IV 100ML 0.9% SOD CHL PLAS CONT USP VIAFLX 1 PER

## (undated) DEVICE — KIT TRK KNEE PROC VIZADISC

## (undated) DEVICE — COVER,TABLE,44X90,STERILE: Brand: MEDLINE

## (undated) DEVICE — SYSTEM SKIN CLSR 22CM DERMBND PRINEO

## (undated) DEVICE — KIT DRP FOR RIO ROBOTIC ARM ASST SYS

## (undated) DEVICE — PAD CLD R HIP REG HOSE NONSTERILE

## (undated) DEVICE — BANDAGE ELASTIC  HONYCMB 6.0INX15YD

## (undated) DEVICE — CLASSIC CLD THER SYS

## (undated) DEVICE — ZIMMER® STERILE DISPOSABLE TOURNIQUET CUFF WITH PLC, DUAL PORT, SINGLE BLADDER, 30 IN. (76 CM)

## (undated) DEVICE — GLOVE ORANGE PI 7   MSG9070

## (undated) DEVICE — SYSTEM VAC MIX SGL DBL CLEARMIX 1 PER CA

## (undated) DEVICE — SUTURE STRATAFIX SPRL SZ 1 L14IN ABSRB VLT L48CM CTX 1/2 SXPD2B405

## (undated) DEVICE — Device: Brand: POWER-FLO®

## (undated) DEVICE — DILATOR ENDOSCP L180CM DIA6FR BLLN L8CM DIA54-60FR

## (undated) DEVICE — ENDOSCOPY KIT: Brand: MEDLINE INDUSTRIES, INC.

## (undated) DEVICE — HOOD: Brand: T7PLUS

## (undated) DEVICE — HOOD WITH PEEL AWAY FACE SHIELD: Brand: T7PLUS

## (undated) DEVICE — T4 HOOD

## (undated) DEVICE — SURGICAL PROCEDURE PACK TOT KNEE LF

## (undated) DEVICE — APPLICATOR MEDICATED 26 CC SOLUTION HI LT ORNG CHLORAPREP

## (undated) DEVICE — BLUNTFILL: Brand: MONOJECT

## (undated) DEVICE — FORCEPS BX L240CM JAW DIA2.8MM L CAP W/ NDL MIC MESH TOOTH

## (undated) DEVICE — SPONGE LAP W18XL18IN WHT COT 4 PLY FLD STRUNG RADPQ DISP ST 2 PER PACK

## (undated) DEVICE — DUAL CUT SAGITTAL BLADE